# Patient Record
Sex: MALE | Race: WHITE | NOT HISPANIC OR LATINO | URBAN - METROPOLITAN AREA
[De-identification: names, ages, dates, MRNs, and addresses within clinical notes are randomized per-mention and may not be internally consistent; named-entity substitution may affect disease eponyms.]

---

## 2018-08-25 ENCOUNTER — EMERGENCY (EMERGENCY)
Facility: HOSPITAL | Age: 58
LOS: 1 days | Discharge: HOME | End: 2018-08-25
Attending: EMERGENCY MEDICINE
Payer: COMMERCIAL

## 2018-08-25 VITALS
HEART RATE: 103 BPM | OXYGEN SATURATION: 97 % | DIASTOLIC BLOOD PRESSURE: 70 MMHG | TEMPERATURE: 99 F | RESPIRATION RATE: 17 BRPM | SYSTOLIC BLOOD PRESSURE: 124 MMHG

## 2018-08-25 VITALS — WEIGHT: 160.06 LBS | HEIGHT: 67 IN

## 2018-08-25 LAB
ALBUMIN SERPL ELPH-MCNC: 3.9 G/DL — SIGNIFICANT CHANGE UP (ref 3.3–5.2)
ALP SERPL-CCNC: 121 U/L — HIGH (ref 40–120)
ALT FLD-CCNC: 9 U/L — SIGNIFICANT CHANGE UP
ANION GAP SERPL CALC-SCNC: 12 MMOL/L — SIGNIFICANT CHANGE UP (ref 5–17)
AST SERPL-CCNC: 13 U/L — SIGNIFICANT CHANGE UP
BILIRUB SERPL-MCNC: <0.2 MG/DL — LOW (ref 0.4–2)
BUN SERPL-MCNC: 28 MG/DL — HIGH (ref 8–20)
CALCIUM SERPL-MCNC: 9.2 MG/DL — SIGNIFICANT CHANGE UP (ref 8.6–10.2)
CHLORIDE SERPL-SCNC: 106 MMOL/L — SIGNIFICANT CHANGE UP (ref 98–107)
CO2 SERPL-SCNC: 23 MMOL/L — SIGNIFICANT CHANGE UP (ref 22–29)
CREAT SERPL-MCNC: 1.5 MG/DL — HIGH (ref 0.5–1.3)
EOSINOPHIL # BLD AUTO: 0.1 K/UL — SIGNIFICANT CHANGE UP (ref 0–0.5)
EOSINOPHIL NFR BLD AUTO: 2 % — SIGNIFICANT CHANGE UP (ref 0–5)
GLUCOSE SERPL-MCNC: 159 MG/DL — HIGH (ref 70–115)
HCT VFR BLD CALC: 34.6 % — LOW (ref 42–52)
HGB BLD-MCNC: 11.4 G/DL — LOW (ref 14–18)
LACTATE BLDV-MCNC: 1.1 MMOL/L — SIGNIFICANT CHANGE UP (ref 0.5–2)
LYMPHOCYTES # BLD AUTO: 0.8 K/UL — LOW (ref 1–4.8)
LYMPHOCYTES # BLD AUTO: 13.6 % — LOW (ref 20–55)
MCHC RBC-ENTMCNC: 31.8 PG — HIGH (ref 27–31)
MCHC RBC-ENTMCNC: 32.9 G/DL — SIGNIFICANT CHANGE UP (ref 32–36)
MCV RBC AUTO: 96.6 FL — HIGH (ref 80–94)
MONOCYTES # BLD AUTO: 0.4 K/UL — SIGNIFICANT CHANGE UP (ref 0–0.8)
MONOCYTES NFR BLD AUTO: 7.1 % — SIGNIFICANT CHANGE UP (ref 3–10)
NEUTROPHILS # BLD AUTO: 4.5 K/UL — SIGNIFICANT CHANGE UP (ref 1.8–8)
NEUTROPHILS NFR BLD AUTO: 77 % — HIGH (ref 37–73)
PLATELET # BLD AUTO: 177 K/UL — SIGNIFICANT CHANGE UP (ref 150–400)
POTASSIUM SERPL-MCNC: 5.2 MMOL/L — SIGNIFICANT CHANGE UP (ref 3.5–5.3)
POTASSIUM SERPL-SCNC: 5.2 MMOL/L — SIGNIFICANT CHANGE UP (ref 3.5–5.3)
PROT SERPL-MCNC: 7.6 G/DL — SIGNIFICANT CHANGE UP (ref 6.6–8.7)
RBC # BLD: 3.58 M/UL — LOW (ref 4.6–6.2)
RBC # FLD: 12.7 % — SIGNIFICANT CHANGE UP (ref 11–15.6)
SODIUM SERPL-SCNC: 141 MMOL/L — SIGNIFICANT CHANGE UP (ref 135–145)
WBC # BLD: 5.9 K/UL — SIGNIFICANT CHANGE UP (ref 4.8–10.8)
WBC # FLD AUTO: 5.9 K/UL — SIGNIFICANT CHANGE UP (ref 4.8–10.8)

## 2018-08-25 PROCEDURE — 80053 COMPREHEN METABOLIC PANEL: CPT

## 2018-08-25 PROCEDURE — 85027 COMPLETE CBC AUTOMATED: CPT

## 2018-08-25 PROCEDURE — 83605 ASSAY OF LACTIC ACID: CPT

## 2018-08-25 PROCEDURE — 99284 EMERGENCY DEPT VISIT MOD MDM: CPT

## 2018-08-25 PROCEDURE — 96372 THER/PROPH/DIAG INJ SC/IM: CPT | Mod: XU

## 2018-08-25 PROCEDURE — 87040 BLOOD CULTURE FOR BACTERIA: CPT

## 2018-08-25 PROCEDURE — 99284 EMERGENCY DEPT VISIT MOD MDM: CPT | Mod: 25

## 2018-08-25 PROCEDURE — 96365 THER/PROPH/DIAG IV INF INIT: CPT

## 2018-08-25 PROCEDURE — 96375 TX/PRO/DX INJ NEW DRUG ADDON: CPT

## 2018-08-25 PROCEDURE — 82962 GLUCOSE BLOOD TEST: CPT

## 2018-08-25 PROCEDURE — 36415 COLL VENOUS BLD VENIPUNCTURE: CPT

## 2018-08-25 RX ORDER — FAMOTIDINE 10 MG/ML
1 INJECTION INTRAVENOUS
Qty: 10 | Refills: 0
Start: 2018-08-25 | End: 2018-09-03

## 2018-08-25 RX ORDER — CEPHALEXIN 500 MG
1 CAPSULE ORAL
Qty: 28 | Refills: 0
Start: 2018-08-25 | End: 2018-08-31

## 2018-08-25 RX ORDER — FAMOTIDINE 10 MG/ML
20 INJECTION INTRAVENOUS ONCE
Qty: 0 | Refills: 0 | Status: COMPLETED | OUTPATIENT
Start: 2018-08-25 | End: 2018-08-25

## 2018-08-25 RX ORDER — CETIRIZINE HYDROCHLORIDE 10 MG/1
1 TABLET ORAL
Qty: 10 | Refills: 0
Start: 2018-08-25 | End: 2018-09-03

## 2018-08-25 RX ORDER — VANCOMYCIN HCL 1 G
1000 VIAL (EA) INTRAVENOUS ONCE
Qty: 0 | Refills: 0 | Status: COMPLETED | OUTPATIENT
Start: 2018-08-25 | End: 2018-08-25

## 2018-08-25 RX ORDER — AMPICILLIN SODIUM AND SULBACTAM SODIUM 250; 125 MG/ML; MG/ML
3 INJECTION, POWDER, FOR SUSPENSION INTRAMUSCULAR; INTRAVENOUS ONCE
Qty: 0 | Refills: 0 | Status: COMPLETED | OUTPATIENT
Start: 2018-08-25 | End: 2018-08-25

## 2018-08-25 RX ORDER — DIPHENHYDRAMINE HCL 50 MG
25 CAPSULE ORAL ONCE
Qty: 0 | Refills: 0 | Status: COMPLETED | OUTPATIENT
Start: 2018-08-25 | End: 2018-08-25

## 2018-08-25 RX ORDER — DEXAMETHASONE 0.5 MG/5ML
8 ELIXIR ORAL ONCE
Qty: 0 | Refills: 0 | Status: COMPLETED | OUTPATIENT
Start: 2018-08-25 | End: 2018-08-25

## 2018-08-25 RX ADMIN — Medication 8 MILLIGRAM(S): at 15:14

## 2018-08-25 RX ADMIN — Medication 125 MILLIGRAM(S): at 12:38

## 2018-08-25 RX ADMIN — Medication 25 MILLIGRAM(S): at 12:38

## 2018-08-25 RX ADMIN — Medication 250 MILLIGRAM(S): at 13:01

## 2018-08-25 RX ADMIN — FAMOTIDINE 20 MILLIGRAM(S): 10 INJECTION INTRAVENOUS at 12:39

## 2018-08-25 RX ADMIN — Medication 1000 MILLIGRAM(S): at 14:15

## 2018-08-25 RX ADMIN — AMPICILLIN SODIUM AND SULBACTAM SODIUM 200 GRAM(S): 250; 125 INJECTION, POWDER, FOR SUSPENSION INTRAMUSCULAR; INTRAVENOUS at 12:44

## 2018-08-25 NOTE — ED ADULT NURSE REASSESSMENT NOTE - NS ED NURSE REASSESS COMMENT FT1
Meds completed.  Decreased swelling and redness to lt hand. Pt re-evaluated by PA, stable for discharge. Out-pt f/u. Pt instructed to return to ED as needed.

## 2018-08-25 NOTE — ED STATDOCS - OBJECTIVE STATEMENT
57 yr old M presented to ED with his mother for left thompson 57 yr old M presented to ED with his mother for left hand pain and swelling x 1 day. Pt explained that he was bitten by an 57 yr old M presented to ED with his mother for left hand pain and swelling x 1 day. Pt explained that he was bitten by an insect. Pt says that he cannot identify the insect that bit him. Pt explained that his hand got swollen over night and it got very red. Pt admits to a hx of DM and hypercholesterolemia. Pt denies any numbness, weakness or paraesthesia. Pt says that when he got up this morning he could not make a fist but now he can. Pt denies any chest pain or shortness of breath.

## 2018-08-25 NOTE — ED STATDOCS - ATTENDING CONTRIBUTION TO CARE
left forearm swelling and pain.  pt states bitten by insect, mild lymphangitis volar surface left forearm, ,  hx dm.  no signs of compartment syndrome, good pulse noted.  no trauma to suspect DVT.  will treat for both cellulitis and allergic reaction with close out patient f/u

## 2018-08-25 NOTE — ED STATDOCS - PROGRESS NOTE DETAILS
Pt treated with IV Vancomycin and Unasyn . IV hydrated. Lactate normal and Pt has no complaints t. All labs results discussed with patient and Pt D/C with F/U with Dr. Mahmood as discussed for elevated Bun/Cr . Pt D/C in stable condition with Rx sent to the pharmacy.

## 2018-08-25 NOTE — ED STATDOCS - PHYSICAL EXAMINATION
Left hand and forearm redness and swelling  noted. No deformity noted  Normal pulses present. Pt has normal ROM in fingers, wrist and no pain on palpation

## 2018-08-25 NOTE — ED STATDOCS - MEDICAL DECISION MAKING DETAILS
57 yr old M presented to ED with his mother for left hand pain and swelling x 1 day. Pt explained that he was bitten by an insect. Pt says that he cannot identify the insect that bit him. Pt explained that his hand got swollen over night and it got very red. Pt Examined and all labs discussed and Pt will F/U for elevated BUN/Cr as discussed

## 2018-08-25 NOTE — ED ADULT TRIAGE NOTE - CHIEF COMPLAINT QUOTE
Patient arrived ambulatory to ED, awake, alert, and oriented times 3, breathing unlabored.  Patient was bite by big yesterday and last night started to experience swelling and redness to left hand.  patient also complaining of pain to left hand .  No fever or chills

## 2018-08-30 LAB
CULTURE RESULTS: SIGNIFICANT CHANGE UP
CULTURE RESULTS: SIGNIFICANT CHANGE UP
SPECIMEN SOURCE: SIGNIFICANT CHANGE UP
SPECIMEN SOURCE: SIGNIFICANT CHANGE UP

## 2019-03-21 ENCOUNTER — APPOINTMENT (OUTPATIENT)
Dept: GASTROENTEROLOGY | Facility: CLINIC | Age: 59
End: 2019-03-21
Payer: COMMERCIAL

## 2019-03-21 VITALS
SYSTOLIC BLOOD PRESSURE: 114 MMHG | HEIGHT: 68 IN | RESPIRATION RATE: 15 BRPM | OXYGEN SATURATION: 98 % | WEIGHT: 152 LBS | BODY MASS INDEX: 23.04 KG/M2 | HEART RATE: 107 BPM | DIASTOLIC BLOOD PRESSURE: 77 MMHG

## 2019-03-21 DIAGNOSIS — Z83.79 FAMILY HISTORY OF OTHER DISEASES OF THE DIGESTIVE SYSTEM: ICD-10-CM

## 2019-03-21 DIAGNOSIS — Z87.19 PERSONAL HISTORY OF OTHER DISEASES OF THE DIGESTIVE SYSTEM: ICD-10-CM

## 2019-03-21 PROCEDURE — 99204 OFFICE O/P NEW MOD 45 MIN: CPT

## 2019-03-21 RX ORDER — LISINOPRIL 2.5 MG/1
2.5 TABLET ORAL
Refills: 0 | Status: ACTIVE | COMMUNITY

## 2019-03-21 RX ORDER — METFORMIN HYDROCHLORIDE 1000 MG/1
1000 TABLET, COATED ORAL
Refills: 0 | Status: ACTIVE | COMMUNITY

## 2019-03-21 NOTE — ASSESSMENT
[FreeTextEntry1] : CRC screening: Will schedule screening colonoscopy.  MiraLAX prep.  Patient is of average risk.\par \par Anemia: Normocytic indices.  Possible relationship to his underlying renal insufficiency.  Will send for anemia workup.  If iron deficient, will plan on EGD as well.

## 2019-03-21 NOTE — CONSULT LETTER
[Dear  ___] : Dear  [unfilled], [Consult Letter:] : I had the pleasure of evaluating your patient, [unfilled]. [Please see my note below.] : Please see my note below. [Consult Closing:] : Thank you very much for allowing me to participate in the care of this patient.  If you have any questions, please do not hesitate to contact me. [FreeTextEntry3] : Very truly yours,\par \par STACEY Adhikari MD\par [facility]\par Gastroenterology at [place]\par [address], [address]\[place] [address]\par Tel [phone]\par Fax [phone]

## 2019-03-21 NOTE — HISTORY OF PRESENT ILLNESS
[de-identified] : This is a 58-year-old man with a past medical history of type II diabetes mellitus on metformin, hypertension on lisinopril, and hyperlipidemia on atorvastatin presents for evaluation for anemia.  The patient was first noted to be anemic in August of 2018 when he presented to [facility] after an insect bite.  His hemoglobin was noted to be 11.4 g/dL.  On repeat blood work done in February, his hemoglobin had nearly normalized.  The patient denies any abdominal pain, bleeding, rectal bleeding, melena, or any changes in his bowel habits.  He denies any family history of colon cancer, but his father did have celiac disease.

## 2019-03-24 LAB
ALBUMIN SERPL ELPH-MCNC: 4 G/DL
ALP BLD-CCNC: 115 U/L
ALT SERPL-CCNC: 11 U/L
ANION GAP SERPL CALC-SCNC: 9 MMOL/L
AST SERPL-CCNC: 12 U/L
BASOPHILS # BLD AUTO: 0.05 K/UL
BASOPHILS NFR BLD AUTO: 0.7 %
BILIRUB SERPL-MCNC: <0.2 MG/DL
BUN SERPL-MCNC: 22 MG/DL
CALCIUM SERPL-MCNC: 9.2 MG/DL
CHLORIDE SERPL-SCNC: 103 MMOL/L
CO2 SERPL-SCNC: 25 MMOL/L
CREAT SERPL-MCNC: 1.55 MG/DL
EOSINOPHIL # BLD AUTO: 0.23 K/UL
EOSINOPHIL NFR BLD AUTO: 3.4 %
FERRITIN SERPL-MCNC: 235 NG/ML
FOLATE SERPL-MCNC: 12.3 NG/ML
GLUCOSE SERPL-MCNC: 269 MG/DL
HCT VFR BLD CALC: 34.2 %
HGB BLD-MCNC: 11 G/DL
IMM GRANULOCYTES NFR BLD AUTO: 0.4 %
INR PPP: 1.03 RATIO
IRON SATN MFR SERPL: 20 %
IRON SERPL-MCNC: 52 UG/DL
LYMPHOCYTES # BLD AUTO: 1.43 K/UL
LYMPHOCYTES NFR BLD AUTO: 21.1 %
MAN DIFF?: NORMAL
MCHC RBC-ENTMCNC: 31.8 PG
MCHC RBC-ENTMCNC: 32.2 GM/DL
MCV RBC AUTO: 98.8 FL
MONOCYTES # BLD AUTO: 0.42 K/UL
MONOCYTES NFR BLD AUTO: 6.2 %
NEUTROPHILS # BLD AUTO: 4.63 K/UL
NEUTROPHILS NFR BLD AUTO: 68.2 %
PLATELET # BLD AUTO: 282 K/UL
POTASSIUM SERPL-SCNC: 5.2 MMOL/L
PROT SERPL-MCNC: 7.3 G/DL
PT BLD: 11.7 SEC
RBC # BLD: 3.46 M/UL
RBC # BLD: 3.46 M/UL
RBC # FLD: 12.1 %
RETICS # AUTO: 1.3 %
RETICS AGGREG/RBC NFR: 43.6 K/UL
SODIUM SERPL-SCNC: 137 MMOL/L
TIBC SERPL-MCNC: 260 UG/DL
UIBC SERPL-MCNC: 208 UG/DL
VIT B12 SERPL-MCNC: 447 PG/ML
WBC # FLD AUTO: 6.79 K/UL

## 2019-03-28 LAB
TTG IGA SER IA-ACNC: 2.2 U/ML
TTG IGA SER-ACNC: NEGATIVE

## 2019-05-28 ENCOUNTER — APPOINTMENT (OUTPATIENT)
Dept: HEMATOLOGY ONCOLOGY | Facility: CLINIC | Age: 59
End: 2019-05-28

## 2019-07-01 ENCOUNTER — APPOINTMENT (OUTPATIENT)
Dept: GASTROENTEROLOGY | Facility: GI CENTER | Age: 59
End: 2019-07-01
Payer: MEDICAID

## 2019-07-01 ENCOUNTER — OUTPATIENT (OUTPATIENT)
Dept: OUTPATIENT SERVICES | Facility: HOSPITAL | Age: 59
LOS: 1 days | End: 2019-07-01
Payer: MEDICAID

## 2019-07-01 ENCOUNTER — RESULT REVIEW (OUTPATIENT)
Age: 59
End: 2019-07-01

## 2019-07-01 DIAGNOSIS — K64.4 RESIDUAL HEMORRHOIDAL SKIN TAGS: ICD-10-CM

## 2019-07-01 DIAGNOSIS — K57.90 DIVERTICULOSIS OF INTESTINE, PART UNSPECIFIED, W/OUT PERFORATION OR ABSCESS W/OUT BLEEDING: ICD-10-CM

## 2019-07-01 DIAGNOSIS — K64.8 OTHER HEMORRHOIDS: ICD-10-CM

## 2019-07-01 DIAGNOSIS — D64.9 ANEMIA, UNSPECIFIED: ICD-10-CM

## 2019-07-01 DIAGNOSIS — K62.1 RECTAL POLYP: ICD-10-CM

## 2019-07-01 LAB — GLUCOSE BLDC GLUCOMTR-MCNC: 138 MG/DL — HIGH (ref 70–99)

## 2019-07-01 PROCEDURE — 82962 GLUCOSE BLOOD TEST: CPT

## 2019-07-01 PROCEDURE — 88305 TISSUE EXAM BY PATHOLOGIST: CPT | Mod: 26

## 2019-07-01 PROCEDURE — 45380 COLONOSCOPY AND BIOPSY: CPT

## 2019-07-01 PROCEDURE — 88305 TISSUE EXAM BY PATHOLOGIST: CPT

## 2019-07-01 PROCEDURE — 45380 COLONOSCOPY AND BIOPSY: CPT | Mod: PT

## 2019-07-01 NOTE — PROCEDURE
[With Biopsy] : with biopsy [With Polypectomy] : polypectomy [Colon Cancer Screening] : colon cancer screening [Procedure Explained] : The procedure was explained [Allergies Reviewed] : allergies reviewed. [Risks] : Risks [Benefits] : benefits [Alternatives] : alternatives [Bleeding] : bleeding risk [Infection] : risk of infection [Consent Obtained] : written consent was obtained prior to the procedure and is detailed in the patient's record [Patient] : the patient [Bowel Prep Kit] : the patient took the appropriate bowel preparation kit as directed [Approved Diet Followed] : the patient avoided solid foods and adhered to the approved diet list for 24 hours prior to the procedure [Automated Blood Pressure Cuff] : automated blood pressure cuff [Cardiac Monitor] : cardiac monitor [Pulse Oximeter] : pulse oximeter [Propofol ___ mg IV] : Propofol [unfilled] ~Umg intravenously [2] : 2 [Sedation Clearance] : the patient was cleared for moderate sedation [Prep Qualtiy: ___] : Prep Quality:  [unfilled] [Withdrawal Time: ___] : Withdrawal Time:  [unfilled] [Performed By: ___] : Performed by:  CAROLINE [Left Lateral Decubitus] : The patient was positioned in the left lateral decubitus position [Abnormal Rectum] : an abnormal rectum [External Hemorrhoids] : external hemorrhoids [Normal Prostate] : a normal prostate [Cecum (Landmarks)] : and guided to the cecum which was identified by the anatomic landmarks of the appendiceal orifice and ileocecal valve [No Difficulty] : without difficulty [Insufflated] : insufflated [Single Pass Needed] : after a single pass [Retroflex View] : a retroflex view of the rectum was performed [Normal] : Normal [Diverticulosis] : diverticulosis [Hemorrhoids] : hemorrhoids [Polyps] : polyps [Biopsy] : biopsy [Sent to Pathology] : was sent to pathology for analysis [Tolerated Well] : the patient tolerated the procedure well [Vital Signs Stable] : the vital signs were stable [No Complications] : There were no complications [FreeTextEntry2] : Olympus [id number]

## 2019-07-01 NOTE — ASSESSMENT
[FreeTextEntry1] : This is a 58-year-old man presented for a screening colonoscopy.  The exam was notable for a good bowel prep with a [place] bowel preparation scale score of nine.  The digital rectal examination was notable for mildly decreased sphincter tone and external hemorrhoids.  The colonoscopy was notable for mild diverticular disease with small mouth diverticula scattered throughout much of the colon with the exception of the ascending colon.  In the rectum, there were both internal and external hemorrhoids and two diminutive sessile polyps that were resected and retrieved using cold biopsy polypectomy.\par \par Followup pathology\par Repeat colonoscopy in five years if polyps are adenomatous

## 2019-07-01 NOTE — ASSESSMENT
[FreeTextEntry1] : This is a 58-year-old man presented for a screening colonoscopy.  The exam was notable for a good bowel prep with a [place] bowel preparation scale score of nine.  The digital rectal examination was notable for mildly decreased sphincter tone and external hemorrhoids.  The colonoscopy was notable for mild diverticular disease with small mouth diverticula scattered throughout much of the colon with the exception of the ascending colon.  In the rectum, there were both internal and external hemorrhoids and two diminutive sessile polyps that were resected and retrieved using cold biopsy polypectomy.\par \par Followup pathology\par Repeat colonoscopy in five years if polyps are adenomatous

## 2019-07-01 NOTE — PROCEDURE
[With Biopsy] : with biopsy [With Polypectomy] : polypectomy [Colon Cancer Screening] : colon cancer screening [Procedure Explained] : The procedure was explained [Allergies Reviewed] : allergies reviewed. [Risks] : Risks [Benefits] : benefits [Alternatives] : alternatives [Bleeding] : bleeding risk [Infection] : risk of infection [Consent Obtained] : written consent was obtained prior to the procedure and is detailed in the patient's record [Patient] : the patient [Bowel Prep Kit] : the patient took the appropriate bowel preparation kit as directed [Approved Diet Followed] : the patient avoided solid foods and adhered to the approved diet list for 24 hours prior to the procedure [Automated Blood Pressure Cuff] : automated blood pressure cuff [Cardiac Monitor] : cardiac monitor [Pulse Oximeter] : pulse oximeter [Propofol ___ mg IV] : Propofol [unfilled] ~Umg intravenously [2] : 2 [Sedation Clearance] : the patient was cleared for moderate sedation [Prep Qualtiy: ___] : Prep Quality:  [unfilled] [Withdrawal Time: ___] : Withdrawal Time:  [unfilled] [Performed By: ___] : Performed by:  CAROLINE [Left Lateral Decubitus] : The patient was positioned in the left lateral decubitus position [Abnormal Rectum] : an abnormal rectum [External Hemorrhoids] : external hemorrhoids [Normal Prostate] : a normal prostate [Cecum (Landmarks)] : and guided to the cecum which was identified by the anatomic landmarks of the appendiceal orifice and ileocecal valve [No Difficulty] : without difficulty [Insufflated] : insufflated [Single Pass Needed] : after a single pass [Retroflex View] : a retroflex view of the rectum was performed [Normal] : Normal [Diverticulosis] : diverticulosis [Hemorrhoids] : hemorrhoids [Polyps] : polyps [Biopsy] : biopsy [Sent to Pathology] : was sent to pathology for analysis [Tolerated Well] : the patient tolerated the procedure well [Vital Signs Stable] : the vital signs were stable [No Complications] : There were no complications [FreeTextEntry2] : Olympus [id number]

## 2019-07-03 LAB — SURGICAL PATHOLOGY STUDY: SIGNIFICANT CHANGE UP

## 2019-07-08 DIAGNOSIS — D12.6 BENIGN NEOPLASM OF COLON, UNSPECIFIED: ICD-10-CM

## 2019-07-08 DIAGNOSIS — Z12.11 ENCOUNTER FOR SCREENING FOR MALIGNANT NEOPLASM OF COLON: ICD-10-CM

## 2019-07-12 ENCOUNTER — OTHER (OUTPATIENT)
Age: 59
End: 2019-07-12

## 2020-01-01 ENCOUNTER — TRANSCRIPTION ENCOUNTER (OUTPATIENT)
Age: 60
End: 2020-01-01

## 2020-01-01 ENCOUNTER — OUTPATIENT (OUTPATIENT)
Dept: OUTPATIENT SERVICES | Facility: HOSPITAL | Age: 60
LOS: 1 days | Discharge: HOME | End: 2020-01-01
Payer: MEDICAID

## 2020-01-01 ENCOUNTER — LABORATORY RESULT (OUTPATIENT)
Age: 60
End: 2020-01-01

## 2020-01-01 ENCOUNTER — OUTPATIENT (OUTPATIENT)
Dept: OUTPATIENT SERVICES | Facility: HOSPITAL | Age: 60
LOS: 1 days | End: 2020-01-01
Payer: MEDICAID

## 2020-01-01 ENCOUNTER — RESULT REVIEW (OUTPATIENT)
Age: 60
End: 2020-01-01

## 2020-01-01 ENCOUNTER — APPOINTMENT (OUTPATIENT)
Dept: HEMATOLOGY ONCOLOGY | Facility: CLINIC | Age: 60
End: 2020-01-01

## 2020-01-01 ENCOUNTER — INPATIENT (INPATIENT)
Facility: HOSPITAL | Age: 60
LOS: 0 days | Discharge: HOME | DRG: 871 | End: 2020-06-26
Attending: INTERNAL MEDICINE | Admitting: INTERNAL MEDICINE
Payer: MEDICAID

## 2020-01-01 ENCOUNTER — APPOINTMENT (OUTPATIENT)
Age: 60
End: 2020-01-01

## 2020-01-01 ENCOUNTER — APPOINTMENT (OUTPATIENT)
Dept: NUCLEAR MEDICINE | Facility: CLINIC | Age: 60
End: 2020-01-01
Payer: MEDICAID

## 2020-01-01 ENCOUNTER — APPOINTMENT (OUTPATIENT)
Dept: DISASTER EMERGENCY | Facility: CLINIC | Age: 60
End: 2020-01-01

## 2020-01-01 ENCOUNTER — APPOINTMENT (OUTPATIENT)
Dept: GASTROENTEROLOGY | Facility: CLINIC | Age: 60
End: 2020-01-01
Payer: MEDICAID

## 2020-01-01 ENCOUNTER — APPOINTMENT (OUTPATIENT)
Dept: THORACIC SURGERY | Facility: CLINIC | Age: 60
End: 2020-01-01
Payer: MEDICAID

## 2020-01-01 ENCOUNTER — APPOINTMENT (OUTPATIENT)
Dept: SURGICAL ONCOLOGY | Facility: CLINIC | Age: 60
End: 2020-01-01
Payer: MEDICAID

## 2020-01-01 ENCOUNTER — EMERGENCY (EMERGENCY)
Facility: HOSPITAL | Age: 60
LOS: 1 days | Discharge: HOME | End: 2020-01-01
Attending: EMERGENCY MEDICINE
Payer: MEDICAID

## 2020-01-01 ENCOUNTER — APPOINTMENT (OUTPATIENT)
Dept: ULTRASOUND IMAGING | Facility: CLINIC | Age: 60
End: 2020-01-01
Payer: MEDICAID

## 2020-01-01 ENCOUNTER — APPOINTMENT (OUTPATIENT)
Dept: GASTROENTEROLOGY | Facility: HOSPITAL | Age: 60
End: 2020-01-01

## 2020-01-01 ENCOUNTER — APPOINTMENT (OUTPATIENT)
Dept: HEMATOLOGY ONCOLOGY | Facility: CLINIC | Age: 60
End: 2020-01-01
Payer: MEDICAID

## 2020-01-01 ENCOUNTER — INPATIENT (INPATIENT)
Facility: HOSPITAL | Age: 60
LOS: 1 days | Discharge: HOME | DRG: 871 | End: 2020-08-01
Attending: HOSPITALIST | Admitting: HOSPITALIST
Payer: MEDICAID

## 2020-01-01 ENCOUNTER — APPOINTMENT (OUTPATIENT)
Dept: RADIATION ONCOLOGY | Facility: CLINIC | Age: 60
End: 2020-01-01
Payer: MEDICAID

## 2020-01-01 ENCOUNTER — OUTPATIENT (OUTPATIENT)
Dept: OUTPATIENT SERVICES | Facility: HOSPITAL | Age: 60
LOS: 1 days | Discharge: HOME | End: 2020-01-01

## 2020-01-01 ENCOUNTER — APPOINTMENT (OUTPATIENT)
Dept: CT IMAGING | Facility: CLINIC | Age: 60
End: 2020-01-01
Payer: MEDICAID

## 2020-01-01 ENCOUNTER — INPATIENT (INPATIENT)
Facility: HOSPITAL | Age: 60
LOS: 6 days | Discharge: NURSING, REHAB OR LONG-TERM CARE | DRG: 871 | End: 2020-08-28
Attending: HOSPITALIST | Admitting: FAMILY MEDICINE
Payer: MEDICAID

## 2020-01-01 ENCOUNTER — INPATIENT (INPATIENT)
Facility: HOSPITAL | Age: 60
LOS: 2 days | Discharge: NURSING, REHAB OR LONG-TERM CARE | DRG: 378 | End: 2020-12-01
Attending: HOSPITALIST | Admitting: STUDENT IN AN ORGANIZED HEALTH CARE EDUCATION/TRAINING PROGRAM
Payer: MEDICAID

## 2020-01-01 ENCOUNTER — OUTPATIENT (OUTPATIENT)
Dept: OUTPATIENT SERVICES | Facility: HOSPITAL | Age: 60
LOS: 1 days | End: 2020-01-01

## 2020-01-01 ENCOUNTER — NON-APPOINTMENT (OUTPATIENT)
Age: 60
End: 2020-01-01

## 2020-01-01 ENCOUNTER — INPATIENT (INPATIENT)
Facility: HOSPITAL | Age: 60
LOS: 4 days | Discharge: HOME | DRG: 872 | End: 2020-08-18
Attending: STUDENT IN AN ORGANIZED HEALTH CARE EDUCATION/TRAINING PROGRAM | Admitting: HOSPITALIST
Payer: MEDICAID

## 2020-01-01 VITALS
OXYGEN SATURATION: 95 % | HEIGHT: 65 IN | HEART RATE: 111 BPM | DIASTOLIC BLOOD PRESSURE: 58 MMHG | SYSTOLIC BLOOD PRESSURE: 100 MMHG | TEMPERATURE: 98 F | RESPIRATION RATE: 18 BRPM

## 2020-01-01 VITALS
SYSTOLIC BLOOD PRESSURE: 110 MMHG | OXYGEN SATURATION: 100 % | TEMPERATURE: 98 F | HEART RATE: 90 BPM | RESPIRATION RATE: 17 BRPM | DIASTOLIC BLOOD PRESSURE: 75 MMHG

## 2020-01-01 VITALS
BODY MASS INDEX: 21.98 KG/M2 | SYSTOLIC BLOOD PRESSURE: 100 MMHG | WEIGHT: 145 LBS | HEART RATE: 141 BPM | OXYGEN SATURATION: 97 % | RESPIRATION RATE: 18 BRPM | TEMPERATURE: 98.3 F | HEIGHT: 68 IN | DIASTOLIC BLOOD PRESSURE: 68 MMHG

## 2020-01-01 VITALS
WEIGHT: 143.74 LBS | DIASTOLIC BLOOD PRESSURE: 61 MMHG | HEIGHT: 65 IN | RESPIRATION RATE: 18 BRPM | HEART RATE: 137 BPM | TEMPERATURE: 99 F | SYSTOLIC BLOOD PRESSURE: 96 MMHG

## 2020-01-01 VITALS
HEIGHT: 67 IN | RESPIRATION RATE: 16 BRPM | SYSTOLIC BLOOD PRESSURE: 120 MMHG | OXYGEN SATURATION: 99 % | TEMPERATURE: 100 F | DIASTOLIC BLOOD PRESSURE: 70 MMHG | HEART RATE: 129 BPM | WEIGHT: 149.91 LBS

## 2020-01-01 VITALS
TEMPERATURE: 103 F | OXYGEN SATURATION: 100 % | DIASTOLIC BLOOD PRESSURE: 72 MMHG | HEIGHT: 65 IN | SYSTOLIC BLOOD PRESSURE: 109 MMHG | RESPIRATION RATE: 20 BRPM | WEIGHT: 143.08 LBS | HEART RATE: 140 BPM

## 2020-01-01 VITALS
SYSTOLIC BLOOD PRESSURE: 75 MMHG | RESPIRATION RATE: 20 BRPM | DIASTOLIC BLOOD PRESSURE: 49 MMHG | TEMPERATURE: 103 F | OXYGEN SATURATION: 90 % | HEART RATE: 143 BPM

## 2020-01-01 VITALS
OXYGEN SATURATION: 93 % | DIASTOLIC BLOOD PRESSURE: 72 MMHG | TEMPERATURE: 98 F | SYSTOLIC BLOOD PRESSURE: 112 MMHG | HEART RATE: 113 BPM | RESPIRATION RATE: 18 BRPM

## 2020-01-01 VITALS
OXYGEN SATURATION: 87 % | TEMPERATURE: 101 F | WEIGHT: 145.06 LBS | SYSTOLIC BLOOD PRESSURE: 88 MMHG | HEIGHT: 65 IN | HEART RATE: 146 BPM | DIASTOLIC BLOOD PRESSURE: 51 MMHG | RESPIRATION RATE: 22 BRPM

## 2020-01-01 VITALS
SYSTOLIC BLOOD PRESSURE: 107 MMHG | HEART RATE: 90 BPM | OXYGEN SATURATION: 100 % | TEMPERATURE: 98 F | RESPIRATION RATE: 17 BRPM | DIASTOLIC BLOOD PRESSURE: 65 MMHG

## 2020-01-01 VITALS
RESPIRATION RATE: 17 BRPM | SYSTOLIC BLOOD PRESSURE: 115 MMHG | OXYGEN SATURATION: 98 % | WEIGHT: 145.4 LBS | DIASTOLIC BLOOD PRESSURE: 73 MMHG | BODY MASS INDEX: 22.11 KG/M2 | HEART RATE: 120 BPM

## 2020-01-01 VITALS
HEART RATE: 141 BPM | HEIGHT: 68 IN | OXYGEN SATURATION: 94 % | BODY MASS INDEX: 21.98 KG/M2 | SYSTOLIC BLOOD PRESSURE: 122 MMHG | WEIGHT: 145.03 LBS | DIASTOLIC BLOOD PRESSURE: 68 MMHG

## 2020-01-01 VITALS
HEIGHT: 65 IN | TEMPERATURE: 101 F | SYSTOLIC BLOOD PRESSURE: 98 MMHG | OXYGEN SATURATION: 95 % | RESPIRATION RATE: 21 BRPM | HEART RATE: 122 BPM | WEIGHT: 139.99 LBS | DIASTOLIC BLOOD PRESSURE: 57 MMHG

## 2020-01-01 VITALS — TEMPERATURE: 98 F | OXYGEN SATURATION: 100 % | HEART RATE: 90 BPM | RESPIRATION RATE: 18 BRPM

## 2020-01-01 VITALS
TEMPERATURE: 99 F | RESPIRATION RATE: 19 BRPM | HEART RATE: 96 BPM | SYSTOLIC BLOOD PRESSURE: 118 MMHG | DIASTOLIC BLOOD PRESSURE: 76 MMHG | OXYGEN SATURATION: 96 %

## 2020-01-01 VITALS
DIASTOLIC BLOOD PRESSURE: 71 MMHG | OXYGEN SATURATION: 98 % | SYSTOLIC BLOOD PRESSURE: 108 MMHG | HEART RATE: 96 BPM | TEMPERATURE: 98 F | RESPIRATION RATE: 18 BRPM

## 2020-01-01 DIAGNOSIS — Z92.21 PERSONAL HISTORY OF ANTINEOPLASTIC CHEMOTHERAPY: Chronic | ICD-10-CM

## 2020-01-01 DIAGNOSIS — Z51.11 ENCOUNTER FOR ANTINEOPLASTIC CHEMOTHERAPY: ICD-10-CM

## 2020-01-01 DIAGNOSIS — C15.9 MALIGNANT NEOPLASM OF ESOPHAGUS, UNSPECIFIED: ICD-10-CM

## 2020-01-01 DIAGNOSIS — Z77.22 CONTACT WITH AND (SUSPECTED) EXPOSURE TO ENVIRONMENTAL TOBACCO SMOKE (ACUTE) (CHRONIC): ICD-10-CM

## 2020-01-01 DIAGNOSIS — Z11.59 ENCOUNTER FOR SCREENING FOR OTHER VIRAL DISEASES: ICD-10-CM

## 2020-01-01 DIAGNOSIS — Z51.5 ENCOUNTER FOR PALLIATIVE CARE: ICD-10-CM

## 2020-01-01 DIAGNOSIS — I10 ESSENTIAL (PRIMARY) HYPERTENSION: ICD-10-CM

## 2020-01-01 DIAGNOSIS — Z98.890 OTHER SPECIFIED POSTPROCEDURAL STATES: Chronic | ICD-10-CM

## 2020-01-01 DIAGNOSIS — N18.9 CHRONIC KIDNEY DISEASE, UNSPECIFIED: ICD-10-CM

## 2020-01-01 DIAGNOSIS — E11.9 TYPE 2 DIABETES MELLITUS W/OUT COMPLICATIONS: ICD-10-CM

## 2020-01-01 DIAGNOSIS — R13.10 DYSPHAGIA, UNSPECIFIED: ICD-10-CM

## 2020-01-01 DIAGNOSIS — Z80.0 FAMILY HISTORY OF MALIGNANT NEOPLASM OF DIGESTIVE ORGANS: ICD-10-CM

## 2020-01-01 DIAGNOSIS — J18.9 PNEUMONIA, UNSPECIFIED ORGANISM: ICD-10-CM

## 2020-01-01 DIAGNOSIS — G89.3 NEOPLASM RELATED PAIN (ACUTE) (CHRONIC): ICD-10-CM

## 2020-01-01 DIAGNOSIS — R93.3 ABNORMAL FINDINGS ON DIAGNOSTIC IMAGING OF OTHER PARTS OF DIGESTIVE TRACT: ICD-10-CM

## 2020-01-01 DIAGNOSIS — R63.4 ABNORMAL WEIGHT LOSS: ICD-10-CM

## 2020-01-01 DIAGNOSIS — Z00.00 ENCOUNTER FOR GENERAL ADULT MEDICAL EXAMINATION W/OUT ABNORMAL FINDINGS: ICD-10-CM

## 2020-01-01 DIAGNOSIS — D64.9 ANEMIA, UNSPECIFIED: ICD-10-CM

## 2020-01-01 DIAGNOSIS — Z80.1 FAMILY HISTORY OF MALIGNANT NEOPLASM OF TRACHEA, BRONCHUS AND LUNG: ICD-10-CM

## 2020-01-01 DIAGNOSIS — R11.11 VOMITING W/OUT NAUSEA: ICD-10-CM

## 2020-01-01 DIAGNOSIS — R10.9 UNSPECIFIED ABDOMINAL PAIN: ICD-10-CM

## 2020-01-01 DIAGNOSIS — Z01.818 ENCOUNTER FOR OTHER PREPROCEDURAL EXAMINATION: ICD-10-CM

## 2020-01-01 DIAGNOSIS — F41.9 ANXIETY DISORDER, UNSPECIFIED: ICD-10-CM

## 2020-01-01 DIAGNOSIS — A41.9 SEPSIS, UNSPECIFIED ORGANISM: ICD-10-CM

## 2020-01-01 DIAGNOSIS — K22.8 OTHER SPECIFIED DISEASES OF ESOPHAGUS: ICD-10-CM

## 2020-01-01 DIAGNOSIS — C15.5 MALIGNANT NEOPLASM OF LOWER THIRD OF ESOPHAGUS: ICD-10-CM

## 2020-01-01 DIAGNOSIS — R11.2 NAUSEA WITH VOMITING, UNSPECIFIED: ICD-10-CM

## 2020-01-01 DIAGNOSIS — Z29.9 ENCOUNTER FOR PROPHYLACTIC MEASURES, UNSPECIFIED: ICD-10-CM

## 2020-01-01 DIAGNOSIS — E11.9 TYPE 2 DIABETES MELLITUS WITHOUT COMPLICATIONS: ICD-10-CM

## 2020-01-01 DIAGNOSIS — Z71.89 OTHER SPECIFIED COUNSELING: ICD-10-CM

## 2020-01-01 DIAGNOSIS — K92.2 GASTROINTESTINAL HEMORRHAGE, UNSPECIFIED: ICD-10-CM

## 2020-01-01 DIAGNOSIS — E78.49 OTHER HYPERLIPIDEMIA: ICD-10-CM

## 2020-01-01 DIAGNOSIS — E87.1 HYPO-OSMOLALITY AND HYPONATREMIA: ICD-10-CM

## 2020-01-01 DIAGNOSIS — E83.42 HYPOMAGNESEMIA: ICD-10-CM

## 2020-01-01 DIAGNOSIS — K21.9 GASTRO-ESOPHAGEAL REFLUX DISEASE W/OUT ESOPHAGITIS: ICD-10-CM

## 2020-01-01 DIAGNOSIS — R12 HEARTBURN: ICD-10-CM

## 2020-01-01 DIAGNOSIS — E78.5 HYPERLIPIDEMIA, UNSPECIFIED: ICD-10-CM

## 2020-01-01 DIAGNOSIS — K59.00 CONSTIPATION, UNSPECIFIED: ICD-10-CM

## 2020-01-01 DIAGNOSIS — C18.9 MALIGNANT NEOPLASM OF COLON, UNSPECIFIED: ICD-10-CM

## 2020-01-01 DIAGNOSIS — Z92.21 PERSONAL HISTORY OF ANTINEOPLASTIC CHEMOTHERAPY: ICD-10-CM

## 2020-01-01 DIAGNOSIS — N17.9 ACUTE KIDNEY FAILURE, UNSPECIFIED: ICD-10-CM

## 2020-01-01 LAB
A1C WITH ESTIMATED AVERAGE GLUCOSE RESULT: 6 % — HIGH (ref 4–5.6)
A1C WITH ESTIMATED AVERAGE GLUCOSE RESULT: 7 % — HIGH (ref 4–5.6)
A1C WITH ESTIMATED AVERAGE GLUCOSE RESULT: 7.4 % — HIGH (ref 4–5.6)
ALBUMIN SERPL ELPH-MCNC: 2.8 G/DL — LOW (ref 3.3–5.2)
ALBUMIN SERPL ELPH-MCNC: 2.9 G/DL — LOW (ref 3.3–5.2)
ALBUMIN SERPL ELPH-MCNC: 3 G/DL — LOW (ref 3.3–5.2)
ALBUMIN SERPL ELPH-MCNC: 3.1 G/DL — LOW (ref 3.3–5.2)
ALBUMIN SERPL ELPH-MCNC: 3.2 G/DL — LOW (ref 3.3–5.2)
ALBUMIN SERPL ELPH-MCNC: 3.4 G/DL — SIGNIFICANT CHANGE UP (ref 3.3–5.2)
ALBUMIN SERPL ELPH-MCNC: 3.4 G/DL — SIGNIFICANT CHANGE UP (ref 3.3–5.2)
ALBUMIN SERPL ELPH-MCNC: 3.8 G/DL — SIGNIFICANT CHANGE UP (ref 3.3–5.2)
ALBUMIN SERPL ELPH-MCNC: 4 G/DL
ALBUMIN SERPL ELPH-MCNC: 4.1 G/DL — SIGNIFICANT CHANGE UP (ref 3.3–5.2)
ALBUMIN SERPL ELPH-MCNC: 4.3 G/DL — SIGNIFICANT CHANGE UP (ref 3.3–5.2)
ALBUMIN SERPL ELPH-MCNC: 4.4 G/DL
ALP BLD-CCNC: 100 U/L
ALP BLD-CCNC: 125 U/L
ALP SERPL-CCNC: 101 U/L — SIGNIFICANT CHANGE UP (ref 40–120)
ALP SERPL-CCNC: 107 U/L — SIGNIFICANT CHANGE UP (ref 40–120)
ALP SERPL-CCNC: 108 U/L — SIGNIFICANT CHANGE UP (ref 40–120)
ALP SERPL-CCNC: 62 U/L — SIGNIFICANT CHANGE UP (ref 40–120)
ALP SERPL-CCNC: 69 U/L — SIGNIFICANT CHANGE UP (ref 40–120)
ALP SERPL-CCNC: 72 U/L — SIGNIFICANT CHANGE UP (ref 40–120)
ALP SERPL-CCNC: 75 U/L — SIGNIFICANT CHANGE UP (ref 40–120)
ALP SERPL-CCNC: 76 U/L — SIGNIFICANT CHANGE UP (ref 40–120)
ALP SERPL-CCNC: 77 U/L — SIGNIFICANT CHANGE UP (ref 40–120)
ALP SERPL-CCNC: 83 U/L — SIGNIFICANT CHANGE UP (ref 40–120)
ALP SERPL-CCNC: 91 U/L — SIGNIFICANT CHANGE UP (ref 40–120)
ALP SERPL-CCNC: 96 U/L — SIGNIFICANT CHANGE UP (ref 40–120)
ALT FLD-CCNC: 11 U/L — SIGNIFICANT CHANGE UP
ALT FLD-CCNC: 12 U/L — SIGNIFICANT CHANGE UP
ALT FLD-CCNC: 13 U/L — SIGNIFICANT CHANGE UP
ALT FLD-CCNC: 13 U/L — SIGNIFICANT CHANGE UP
ALT FLD-CCNC: 14 U/L — SIGNIFICANT CHANGE UP
ALT FLD-CCNC: 17 U/L — SIGNIFICANT CHANGE UP
ALT FLD-CCNC: 18 U/L — SIGNIFICANT CHANGE UP
ALT FLD-CCNC: 23 U/L — SIGNIFICANT CHANGE UP
ALT FLD-CCNC: 9 U/L — SIGNIFICANT CHANGE UP
ALT FLD-CCNC: <5 U/L — SIGNIFICANT CHANGE UP
ALT SERPL-CCNC: 18 U/L
ALT SERPL-CCNC: 20 U/L
ANION GAP SERPL CALC-SCNC: 11 MMOL/L — SIGNIFICANT CHANGE UP (ref 5–17)
ANION GAP SERPL CALC-SCNC: 12 MMOL/L — SIGNIFICANT CHANGE UP (ref 5–17)
ANION GAP SERPL CALC-SCNC: 13 MMOL/L — SIGNIFICANT CHANGE UP (ref 5–17)
ANION GAP SERPL CALC-SCNC: 14 MMOL/L — SIGNIFICANT CHANGE UP (ref 5–17)
ANION GAP SERPL CALC-SCNC: 15 MMOL/L
ANION GAP SERPL CALC-SCNC: 16 MMOL/L — SIGNIFICANT CHANGE UP (ref 5–17)
ANION GAP SERPL CALC-SCNC: 20 MMOL/L
ANION GAP SERPL CALC-SCNC: 20 MMOL/L — HIGH (ref 5–17)
ANION GAP SERPL CALC-SCNC: 9 MMOL/L — SIGNIFICANT CHANGE UP (ref 5–17)
ANISOCYTOSIS BLD QL: SLIGHT — SIGNIFICANT CHANGE UP
APPEARANCE UR: ABNORMAL
APPEARANCE UR: CLEAR — SIGNIFICANT CHANGE UP
APTT BLD: 22.4 SEC — LOW (ref 27.5–36.3)
APTT BLD: 24.7 SEC — LOW (ref 27.5–35.5)
APTT BLD: 25.3 SEC — LOW (ref 27.5–35.5)
APTT BLD: 25.4 SEC — LOW (ref 27.5–35.5)
APTT BLD: 26.8 SEC — LOW (ref 27.5–35.5)
APTT BLD: 26.8 SEC — LOW (ref 27.5–35.5)
AST SERPL-CCNC: 11 U/L — SIGNIFICANT CHANGE UP
AST SERPL-CCNC: 11 U/L — SIGNIFICANT CHANGE UP
AST SERPL-CCNC: 12 U/L — SIGNIFICANT CHANGE UP
AST SERPL-CCNC: 13 U/L — SIGNIFICANT CHANGE UP
AST SERPL-CCNC: 13 U/L — SIGNIFICANT CHANGE UP
AST SERPL-CCNC: 14 U/L — SIGNIFICANT CHANGE UP
AST SERPL-CCNC: 16 U/L
AST SERPL-CCNC: 17 U/L — SIGNIFICANT CHANGE UP
AST SERPL-CCNC: 18 U/L — SIGNIFICANT CHANGE UP
AST SERPL-CCNC: 20 U/L
AST SERPL-CCNC: 22 U/L — SIGNIFICANT CHANGE UP
AST SERPL-CCNC: 25 U/L — SIGNIFICANT CHANGE UP
AST SERPL-CCNC: 7 U/L — SIGNIFICANT CHANGE UP
AST SERPL-CCNC: 9 U/L — SIGNIFICANT CHANGE UP
BACTERIA # UR AUTO: ABNORMAL
BASOPHILS # BLD AUTO: 0 K/UL — SIGNIFICANT CHANGE UP (ref 0–0.2)
BASOPHILS # BLD AUTO: 0.01 K/UL — SIGNIFICANT CHANGE UP (ref 0–0.2)
BASOPHILS # BLD AUTO: 0.03 K/UL — SIGNIFICANT CHANGE UP (ref 0–0.2)
BASOPHILS # BLD AUTO: 0.04 K/UL — SIGNIFICANT CHANGE UP (ref 0–0.2)
BASOPHILS # BLD AUTO: 0.05 K/UL — SIGNIFICANT CHANGE UP (ref 0–0.2)
BASOPHILS # BLD AUTO: 0.06 K/UL — SIGNIFICANT CHANGE UP (ref 0–0.2)
BASOPHILS # BLD AUTO: 0.07 K/UL — SIGNIFICANT CHANGE UP (ref 0–0.2)
BASOPHILS # BLD AUTO: 0.08 K/UL — SIGNIFICANT CHANGE UP (ref 0–0.2)
BASOPHILS # BLD AUTO: 0.1 K/UL — SIGNIFICANT CHANGE UP (ref 0–0.2)
BASOPHILS NFR BLD AUTO: 0 % — SIGNIFICANT CHANGE UP (ref 0–2)
BASOPHILS NFR BLD AUTO: 0.2 % — SIGNIFICANT CHANGE UP (ref 0–2)
BASOPHILS NFR BLD AUTO: 0.3 % — SIGNIFICANT CHANGE UP (ref 0–2)
BASOPHILS NFR BLD AUTO: 0.4 % — SIGNIFICANT CHANGE UP (ref 0–2)
BASOPHILS NFR BLD AUTO: 0.5 % — SIGNIFICANT CHANGE UP (ref 0–2)
BASOPHILS NFR BLD AUTO: 0.6 % — SIGNIFICANT CHANGE UP (ref 0–2)
BASOPHILS NFR BLD AUTO: 0.8 % — SIGNIFICANT CHANGE UP (ref 0–2)
BASOPHILS NFR BLD AUTO: 0.9 % — SIGNIFICANT CHANGE UP (ref 0–2)
BASOPHILS NFR BLD AUTO: 0.9 % — SIGNIFICANT CHANGE UP (ref 0–2)
BASOPHILS NFR BLD AUTO: 1.3 % — SIGNIFICANT CHANGE UP (ref 0–2)
BILIRUB SERPL-MCNC: 0.2 MG/DL — LOW (ref 0.4–2)
BILIRUB SERPL-MCNC: 0.3 MG/DL
BILIRUB SERPL-MCNC: 0.3 MG/DL — LOW (ref 0.4–2)
BILIRUB SERPL-MCNC: 0.4 MG/DL — SIGNIFICANT CHANGE UP (ref 0.4–2)
BILIRUB SERPL-MCNC: <0.2 MG/DL
BILIRUB SERPL-MCNC: <0.2 MG/DL — LOW (ref 0.4–2)
BILIRUB UR-MCNC: NEGATIVE — SIGNIFICANT CHANGE UP
BLD GP AB SCN SERPL QL: SIGNIFICANT CHANGE UP
BLD GP AB SCN SERPL QL: SIGNIFICANT CHANGE UP
BUN SERPL-MCNC: 11 MG/DL — SIGNIFICANT CHANGE UP (ref 8–20)
BUN SERPL-MCNC: 12 MG/DL — SIGNIFICANT CHANGE UP (ref 8–20)
BUN SERPL-MCNC: 12 MG/DL — SIGNIFICANT CHANGE UP (ref 8–20)
BUN SERPL-MCNC: 13 MG/DL — SIGNIFICANT CHANGE UP (ref 8–20)
BUN SERPL-MCNC: 15 MG/DL — SIGNIFICANT CHANGE UP (ref 8–20)
BUN SERPL-MCNC: 16 MG/DL — SIGNIFICANT CHANGE UP (ref 8–20)
BUN SERPL-MCNC: 17 MG/DL — SIGNIFICANT CHANGE UP (ref 8–20)
BUN SERPL-MCNC: 18 MG/DL — SIGNIFICANT CHANGE UP (ref 8–20)
BUN SERPL-MCNC: 18 MG/DL — SIGNIFICANT CHANGE UP (ref 8–20)
BUN SERPL-MCNC: 22 MG/DL — HIGH (ref 8–20)
BUN SERPL-MCNC: 23 MG/DL — HIGH (ref 8–20)
BUN SERPL-MCNC: 24 MG/DL — HIGH (ref 8–20)
BUN SERPL-MCNC: 24 MG/DL — HIGH (ref 8–20)
BUN SERPL-MCNC: 25 MG/DL — HIGH (ref 8–20)
BUN SERPL-MCNC: 33 MG/DL — HIGH (ref 8–20)
BUN SERPL-MCNC: 34 MG/DL — HIGH (ref 8–20)
BUN SERPL-MCNC: 38 MG/DL
BUN SERPL-MCNC: 39 MG/DL — HIGH (ref 8–20)
BUN SERPL-MCNC: 40 MG/DL — HIGH (ref 8–20)
BUN SERPL-MCNC: 41 MG/DL — HIGH (ref 8–20)
BUN SERPL-MCNC: 44 MG/DL
BUN SERPL-MCNC: 44 MG/DL — HIGH (ref 8–20)
BUN SERPL-MCNC: 54 MG/DL — HIGH (ref 8–20)
BURR CELLS BLD QL SMEAR: PRESENT — SIGNIFICANT CHANGE UP
CALCIUM SERPL-MCNC: 10 MG/DL — SIGNIFICANT CHANGE UP (ref 8.6–10.2)
CALCIUM SERPL-MCNC: 10.1 MG/DL — SIGNIFICANT CHANGE UP (ref 8.6–10.2)
CALCIUM SERPL-MCNC: 8 MG/DL — LOW (ref 8.6–10.2)
CALCIUM SERPL-MCNC: 8.5 MG/DL — LOW (ref 8.6–10.2)
CALCIUM SERPL-MCNC: 8.6 MG/DL — SIGNIFICANT CHANGE UP (ref 8.6–10.2)
CALCIUM SERPL-MCNC: 8.8 MG/DL — SIGNIFICANT CHANGE UP (ref 8.6–10.2)
CALCIUM SERPL-MCNC: 8.9 MG/DL — SIGNIFICANT CHANGE UP (ref 8.6–10.2)
CALCIUM SERPL-MCNC: 9 MG/DL — SIGNIFICANT CHANGE UP (ref 8.6–10.2)
CALCIUM SERPL-MCNC: 9.1 MG/DL — SIGNIFICANT CHANGE UP (ref 8.6–10.2)
CALCIUM SERPL-MCNC: 9.2 MG/DL — SIGNIFICANT CHANGE UP (ref 8.6–10.2)
CALCIUM SERPL-MCNC: 9.3 MG/DL — SIGNIFICANT CHANGE UP (ref 8.6–10.2)
CALCIUM SERPL-MCNC: 9.3 MG/DL — SIGNIFICANT CHANGE UP (ref 8.6–10.2)
CALCIUM SERPL-MCNC: 9.4 MG/DL — SIGNIFICANT CHANGE UP (ref 8.6–10.2)
CALCIUM SERPL-MCNC: 9.5 MG/DL — SIGNIFICANT CHANGE UP (ref 8.6–10.2)
CALCIUM SERPL-MCNC: 9.6 MG/DL
CALCIUM SERPL-MCNC: 9.6 MG/DL — SIGNIFICANT CHANGE UP (ref 8.6–10.2)
CALCIUM SERPL-MCNC: 9.6 MG/DL — SIGNIFICANT CHANGE UP (ref 8.6–10.2)
CALCIUM SERPL-MCNC: 9.7 MG/DL
CALCIUM SERPL-MCNC: 9.7 MG/DL — SIGNIFICANT CHANGE UP (ref 8.6–10.2)
CALCIUM SERPL-MCNC: 9.7 MG/DL — SIGNIFICANT CHANGE UP (ref 8.6–10.2)
CHLORIDE SERPL-SCNC: 100 MMOL/L — SIGNIFICANT CHANGE UP (ref 98–107)
CHLORIDE SERPL-SCNC: 100 MMOL/L — SIGNIFICANT CHANGE UP (ref 98–107)
CHLORIDE SERPL-SCNC: 101 MMOL/L — SIGNIFICANT CHANGE UP (ref 98–107)
CHLORIDE SERPL-SCNC: 107 MMOL/L — SIGNIFICANT CHANGE UP (ref 98–107)
CHLORIDE SERPL-SCNC: 89 MMOL/L — LOW (ref 98–107)
CHLORIDE SERPL-SCNC: 91 MMOL/L — LOW (ref 98–107)
CHLORIDE SERPL-SCNC: 93 MMOL/L — LOW (ref 98–107)
CHLORIDE SERPL-SCNC: 93 MMOL/L — LOW (ref 98–107)
CHLORIDE SERPL-SCNC: 94 MMOL/L
CHLORIDE SERPL-SCNC: 95 MMOL/L — LOW (ref 98–107)
CHLORIDE SERPL-SCNC: 96 MMOL/L — LOW (ref 98–107)
CHLORIDE SERPL-SCNC: 97 MMOL/L — LOW (ref 98–107)
CHLORIDE SERPL-SCNC: 98 MMOL/L
CHLORIDE SERPL-SCNC: 98 MMOL/L — SIGNIFICANT CHANGE UP (ref 98–107)
CHLORIDE SERPL-SCNC: 99 MMOL/L — SIGNIFICANT CHANGE UP (ref 98–107)
CK SERPL-CCNC: 22 U/L — LOW (ref 30–200)
CO2 SERPL-SCNC: 19 MMOL/L — LOW (ref 22–29)
CO2 SERPL-SCNC: 21 MMOL/L — LOW (ref 22–29)
CO2 SERPL-SCNC: 21 MMOL/L — LOW (ref 22–29)
CO2 SERPL-SCNC: 22 MMOL/L — SIGNIFICANT CHANGE UP (ref 22–29)
CO2 SERPL-SCNC: 22 MMOL/L — SIGNIFICANT CHANGE UP (ref 22–29)
CO2 SERPL-SCNC: 23 MMOL/L
CO2 SERPL-SCNC: 23 MMOL/L — SIGNIFICANT CHANGE UP (ref 22–29)
CO2 SERPL-SCNC: 23 MMOL/L — SIGNIFICANT CHANGE UP (ref 22–29)
CO2 SERPL-SCNC: 24 MMOL/L
CO2 SERPL-SCNC: 24 MMOL/L — SIGNIFICANT CHANGE UP (ref 22–29)
CO2 SERPL-SCNC: 24 MMOL/L — SIGNIFICANT CHANGE UP (ref 22–29)
CO2 SERPL-SCNC: 25 MMOL/L — SIGNIFICANT CHANGE UP (ref 22–29)
CO2 SERPL-SCNC: 26 MMOL/L — SIGNIFICANT CHANGE UP (ref 22–29)
CO2 SERPL-SCNC: 27 MMOL/L — SIGNIFICANT CHANGE UP (ref 22–29)
COLOR SPEC: YELLOW — SIGNIFICANT CHANGE UP
CREAT SERPL-MCNC: 1.27 MG/DL — SIGNIFICANT CHANGE UP (ref 0.5–1.3)
CREAT SERPL-MCNC: 1.3 MG/DL — SIGNIFICANT CHANGE UP (ref 0.5–1.3)
CREAT SERPL-MCNC: 1.34 MG/DL — HIGH (ref 0.5–1.3)
CREAT SERPL-MCNC: 1.36 MG/DL — HIGH (ref 0.5–1.3)
CREAT SERPL-MCNC: 1.39 MG/DL — HIGH (ref 0.5–1.3)
CREAT SERPL-MCNC: 1.4 MG/DL — HIGH (ref 0.5–1.3)
CREAT SERPL-MCNC: 1.41 MG/DL — HIGH (ref 0.5–1.3)
CREAT SERPL-MCNC: 1.45 MG/DL — HIGH (ref 0.5–1.3)
CREAT SERPL-MCNC: 1.46 MG/DL — HIGH (ref 0.5–1.3)
CREAT SERPL-MCNC: 1.47 MG/DL — HIGH (ref 0.5–1.3)
CREAT SERPL-MCNC: 1.5 MG/DL — HIGH (ref 0.5–1.3)
CREAT SERPL-MCNC: 1.5 MG/DL — HIGH (ref 0.5–1.3)
CREAT SERPL-MCNC: 1.59 MG/DL — HIGH (ref 0.5–1.3)
CREAT SERPL-MCNC: 1.6 MG/DL — HIGH (ref 0.5–1.3)
CREAT SERPL-MCNC: 1.64 MG/DL — HIGH (ref 0.5–1.3)
CREAT SERPL-MCNC: 1.68 MG/DL — HIGH (ref 0.5–1.3)
CREAT SERPL-MCNC: 1.7 MG/DL — HIGH (ref 0.5–1.3)
CREAT SERPL-MCNC: 1.78 MG/DL — HIGH (ref 0.5–1.3)
CREAT SERPL-MCNC: 1.86 MG/DL — HIGH (ref 0.5–1.3)
CREAT SERPL-MCNC: 1.92 MG/DL — HIGH (ref 0.5–1.3)
CREAT SERPL-MCNC: 1.92 MG/DL — HIGH (ref 0.5–1.3)
CREAT SERPL-MCNC: 1.98 MG/DL
CREAT SERPL-MCNC: 2.07 MG/DL
CULTURE RESULTS: NO GROWTH — SIGNIFICANT CHANGE UP
CULTURE RESULTS: SIGNIFICANT CHANGE UP
DACRYOCYTES BLD QL SMEAR: SLIGHT — SIGNIFICANT CHANGE UP
DIFF PNL FLD: NEGATIVE — SIGNIFICANT CHANGE UP
ELLIPTOCYTES BLD QL SMEAR: SLIGHT — SIGNIFICANT CHANGE UP
EOSINOPHIL # BLD AUTO: 0 K/UL — SIGNIFICANT CHANGE UP (ref 0–0.5)
EOSINOPHIL # BLD AUTO: 0.02 K/UL — SIGNIFICANT CHANGE UP (ref 0–0.5)
EOSINOPHIL # BLD AUTO: 0.07 K/UL — SIGNIFICANT CHANGE UP (ref 0–0.5)
EOSINOPHIL # BLD AUTO: 0.1 K/UL — SIGNIFICANT CHANGE UP (ref 0–0.5)
EOSINOPHIL # BLD AUTO: 0.1 K/UL — SIGNIFICANT CHANGE UP (ref 0–0.5)
EOSINOPHIL # BLD AUTO: 0.15 K/UL — SIGNIFICANT CHANGE UP (ref 0–0.5)
EOSINOPHIL # BLD AUTO: 0.16 K/UL — SIGNIFICANT CHANGE UP (ref 0–0.5)
EOSINOPHIL # BLD AUTO: 0.2 K/UL — SIGNIFICANT CHANGE UP (ref 0–0.5)
EOSINOPHIL # BLD AUTO: 0.29 K/UL — SIGNIFICANT CHANGE UP (ref 0–0.5)
EOSINOPHIL # BLD AUTO: 0.41 K/UL — SIGNIFICANT CHANGE UP (ref 0–0.5)
EOSINOPHIL # BLD AUTO: 0.5 K/UL — SIGNIFICANT CHANGE UP (ref 0–0.5)
EOSINOPHIL # BLD AUTO: 0.57 K/UL — HIGH (ref 0–0.5)
EOSINOPHIL # BLD AUTO: 0.57 K/UL — HIGH (ref 0–0.5)
EOSINOPHIL # BLD AUTO: 0.61 K/UL — HIGH (ref 0–0.5)
EOSINOPHIL # BLD AUTO: 0.62 K/UL — HIGH (ref 0–0.5)
EOSINOPHIL # BLD AUTO: 1.3 K/UL — HIGH (ref 0–0.5)
EOSINOPHIL NFR BLD AUTO: 0 % — SIGNIFICANT CHANGE UP (ref 0–6)
EOSINOPHIL NFR BLD AUTO: 0.3 % — SIGNIFICANT CHANGE UP (ref 0–6)
EOSINOPHIL NFR BLD AUTO: 0.7 % — SIGNIFICANT CHANGE UP (ref 0–6)
EOSINOPHIL NFR BLD AUTO: 0.9 % — SIGNIFICANT CHANGE UP (ref 0–6)
EOSINOPHIL NFR BLD AUTO: 1.4 % — SIGNIFICANT CHANGE UP (ref 0–6)
EOSINOPHIL NFR BLD AUTO: 1.7 % — SIGNIFICANT CHANGE UP (ref 0–6)
EOSINOPHIL NFR BLD AUTO: 13.2 % — HIGH (ref 0–6)
EOSINOPHIL NFR BLD AUTO: 2.2 % — SIGNIFICANT CHANGE UP (ref 0–6)
EOSINOPHIL NFR BLD AUTO: 3 % — SIGNIFICANT CHANGE UP (ref 0–6)
EOSINOPHIL NFR BLD AUTO: 3.2 % — SIGNIFICANT CHANGE UP (ref 0–6)
EOSINOPHIL NFR BLD AUTO: 3.6 % — SIGNIFICANT CHANGE UP (ref 0–6)
EOSINOPHIL NFR BLD AUTO: 3.7 % — SIGNIFICANT CHANGE UP (ref 0–6)
EOSINOPHIL NFR BLD AUTO: 6.3 % — HIGH (ref 0–6)
EOSINOPHIL NFR BLD AUTO: 7.1 % — HIGH (ref 0–6)
EOSINOPHIL NFR BLD AUTO: 7.6 % — HIGH (ref 0–6)
EOSINOPHIL NFR BLD AUTO: 9.4 % — HIGH (ref 0–6)
EOSINOPHIL NFR BLD AUTO: 9.4 % — HIGH (ref 0–6)
EPI CELLS # UR: SIGNIFICANT CHANGE UP
ESTIMATED AVERAGE GLUCOSE: 126 MG/DL — HIGH (ref 68–114)
ESTIMATED AVERAGE GLUCOSE: 154 MG/DL — HIGH (ref 68–114)
ESTIMATED AVERAGE GLUCOSE: 166 MG/DL — HIGH (ref 68–114)
GLUCOSE BLDC GLUCOMTR-MCNC: 106 MG/DL — HIGH (ref 70–99)
GLUCOSE BLDC GLUCOMTR-MCNC: 109 MG/DL — HIGH (ref 70–99)
GLUCOSE BLDC GLUCOMTR-MCNC: 127 MG/DL — HIGH (ref 70–99)
GLUCOSE BLDC GLUCOMTR-MCNC: 131 MG/DL — HIGH (ref 70–99)
GLUCOSE BLDC GLUCOMTR-MCNC: 132 MG/DL — HIGH (ref 70–99)
GLUCOSE BLDC GLUCOMTR-MCNC: 136 MG/DL — HIGH (ref 70–99)
GLUCOSE BLDC GLUCOMTR-MCNC: 142 MG/DL — HIGH (ref 70–99)
GLUCOSE BLDC GLUCOMTR-MCNC: 145 MG/DL — HIGH (ref 70–99)
GLUCOSE BLDC GLUCOMTR-MCNC: 148 MG/DL — HIGH (ref 70–99)
GLUCOSE BLDC GLUCOMTR-MCNC: 153 MG/DL — HIGH (ref 70–99)
GLUCOSE BLDC GLUCOMTR-MCNC: 153 MG/DL — HIGH (ref 70–99)
GLUCOSE BLDC GLUCOMTR-MCNC: 159 MG/DL — HIGH (ref 70–99)
GLUCOSE BLDC GLUCOMTR-MCNC: 160 MG/DL — HIGH (ref 70–99)
GLUCOSE BLDC GLUCOMTR-MCNC: 162 MG/DL — HIGH (ref 70–99)
GLUCOSE BLDC GLUCOMTR-MCNC: 163 MG/DL — HIGH (ref 70–99)
GLUCOSE BLDC GLUCOMTR-MCNC: 167 MG/DL — HIGH (ref 70–99)
GLUCOSE BLDC GLUCOMTR-MCNC: 168 MG/DL — HIGH (ref 70–99)
GLUCOSE BLDC GLUCOMTR-MCNC: 171 MG/DL — HIGH (ref 70–99)
GLUCOSE BLDC GLUCOMTR-MCNC: 174 MG/DL — HIGH (ref 70–99)
GLUCOSE BLDC GLUCOMTR-MCNC: 178 MG/DL — HIGH (ref 70–99)
GLUCOSE BLDC GLUCOMTR-MCNC: 181 MG/DL — HIGH (ref 70–99)
GLUCOSE BLDC GLUCOMTR-MCNC: 184 MG/DL — HIGH (ref 70–99)
GLUCOSE BLDC GLUCOMTR-MCNC: 184 MG/DL — HIGH (ref 70–99)
GLUCOSE BLDC GLUCOMTR-MCNC: 185 MG/DL — HIGH (ref 70–99)
GLUCOSE BLDC GLUCOMTR-MCNC: 186 MG/DL — HIGH (ref 70–99)
GLUCOSE BLDC GLUCOMTR-MCNC: 187 MG/DL — HIGH (ref 70–99)
GLUCOSE BLDC GLUCOMTR-MCNC: 188 MG/DL — HIGH (ref 70–99)
GLUCOSE BLDC GLUCOMTR-MCNC: 190 MG/DL — HIGH (ref 70–99)
GLUCOSE BLDC GLUCOMTR-MCNC: 193 MG/DL — HIGH (ref 70–99)
GLUCOSE BLDC GLUCOMTR-MCNC: 194 MG/DL — HIGH (ref 70–99)
GLUCOSE BLDC GLUCOMTR-MCNC: 198 MG/DL — HIGH (ref 70–99)
GLUCOSE BLDC GLUCOMTR-MCNC: 212 MG/DL — HIGH (ref 70–99)
GLUCOSE BLDC GLUCOMTR-MCNC: 213 MG/DL — HIGH (ref 70–99)
GLUCOSE BLDC GLUCOMTR-MCNC: 226 MG/DL — HIGH (ref 70–99)
GLUCOSE BLDC GLUCOMTR-MCNC: 273 MG/DL — HIGH (ref 70–99)
GLUCOSE BLDC GLUCOMTR-MCNC: 274 MG/DL — HIGH (ref 70–99)
GLUCOSE BLDC GLUCOMTR-MCNC: 98 MG/DL — SIGNIFICANT CHANGE UP (ref 70–99)
GLUCOSE SERPL-MCNC: 109 MG/DL — HIGH (ref 70–99)
GLUCOSE SERPL-MCNC: 136 MG/DL — HIGH (ref 70–99)
GLUCOSE SERPL-MCNC: 144 MG/DL
GLUCOSE SERPL-MCNC: 144 MG/DL — HIGH (ref 70–99)
GLUCOSE SERPL-MCNC: 151 MG/DL — HIGH (ref 70–99)
GLUCOSE SERPL-MCNC: 152 MG/DL — HIGH (ref 70–99)
GLUCOSE SERPL-MCNC: 152 MG/DL — HIGH (ref 70–99)
GLUCOSE SERPL-MCNC: 153 MG/DL — HIGH (ref 70–99)
GLUCOSE SERPL-MCNC: 160 MG/DL — HIGH (ref 70–99)
GLUCOSE SERPL-MCNC: 160 MG/DL — HIGH (ref 70–99)
GLUCOSE SERPL-MCNC: 169 MG/DL — HIGH (ref 70–99)
GLUCOSE SERPL-MCNC: 170 MG/DL — HIGH (ref 70–99)
GLUCOSE SERPL-MCNC: 171 MG/DL — HIGH (ref 70–99)
GLUCOSE SERPL-MCNC: 175 MG/DL — HIGH (ref 70–99)
GLUCOSE SERPL-MCNC: 176 MG/DL — HIGH (ref 70–99)
GLUCOSE SERPL-MCNC: 180 MG/DL — HIGH (ref 70–99)
GLUCOSE SERPL-MCNC: 182 MG/DL — HIGH (ref 70–99)
GLUCOSE SERPL-MCNC: 187 MG/DL — HIGH (ref 70–99)
GLUCOSE SERPL-MCNC: 190 MG/DL
GLUCOSE SERPL-MCNC: 190 MG/DL — HIGH (ref 70–99)
GLUCOSE SERPL-MCNC: 193 MG/DL — HIGH (ref 70–99)
GLUCOSE SERPL-MCNC: 204 MG/DL — HIGH (ref 70–99)
GLUCOSE SERPL-MCNC: 218 MG/DL — HIGH (ref 70–99)
GLUCOSE SERPL-MCNC: 232 MG/DL — HIGH (ref 70–99)
GLUCOSE SERPL-MCNC: 237 MG/DL — HIGH (ref 70–99)
GLUCOSE UR QL: 50 MG/DL
GLUCOSE UR QL: NEGATIVE MG/DL — SIGNIFICANT CHANGE UP
HBV CORE IGG+IGM SER QL: NONREACTIVE
HBV SURFACE AB SER QL: NONREACTIVE
HBV SURFACE AG SER QL: NONREACTIVE
HCT VFR BLD CALC: 15.9 % — CRITICAL LOW (ref 39–50)
HCT VFR BLD CALC: 20.8 % — CRITICAL LOW (ref 39–50)
HCT VFR BLD CALC: 22.7 % — LOW (ref 39–50)
HCT VFR BLD CALC: 23 % — LOW (ref 39–50)
HCT VFR BLD CALC: 23.2 % — LOW (ref 39–50)
HCT VFR BLD CALC: 23.2 % — LOW (ref 39–50)
HCT VFR BLD CALC: 23.3 % — LOW (ref 39–50)
HCT VFR BLD CALC: 24.6 % — LOW (ref 39–50)
HCT VFR BLD CALC: 24.8 % — LOW (ref 39–50)
HCT VFR BLD CALC: 25.2 % — LOW (ref 39–50)
HCT VFR BLD CALC: 25.4 % — LOW (ref 39–50)
HCT VFR BLD CALC: 25.7 % — LOW (ref 39–50)
HCT VFR BLD CALC: 26.1 % — LOW (ref 39–50)
HCT VFR BLD CALC: 26.6 % — LOW (ref 39–50)
HCT VFR BLD CALC: 26.7 % — LOW (ref 39–50)
HCT VFR BLD CALC: 27 % — LOW (ref 39–50)
HCT VFR BLD CALC: 27.4 % — LOW (ref 39–50)
HCT VFR BLD CALC: 27.6 % — LOW (ref 39–50)
HCT VFR BLD CALC: 27.6 % — LOW (ref 39–50)
HCT VFR BLD CALC: 27.7 % — LOW (ref 39–50)
HCT VFR BLD CALC: 27.9 % — LOW (ref 39–50)
HCT VFR BLD CALC: 28.3 % — LOW (ref 39–50)
HCT VFR BLD CALC: 28.4 % — LOW (ref 39–50)
HCT VFR BLD CALC: 28.5 % — LOW (ref 39–50)
HCT VFR BLD CALC: 28.9 % — LOW (ref 39–50)
HCT VFR BLD CALC: 29.4 % — LOW (ref 39–50)
HCT VFR BLD CALC: 29.7 % — LOW (ref 39–50)
HCT VFR BLD CALC: 29.9 % — LOW (ref 39–50)
HCT VFR BLD CALC: 32.6 % — LOW (ref 39–50)
HCT VFR BLD CALC: 37 % — LOW (ref 39–50)
HCV AB S/CO SERPL IA: 0.18 S/CO — SIGNIFICANT CHANGE UP (ref 0–0.99)
HCV AB SER QL: NONREACTIVE
HCV AB SERPL-IMP: SIGNIFICANT CHANGE UP
HCV S/CO RATIO: 0.21 S/CO
HGB BLD-MCNC: 10.1 G/DL — LOW (ref 13–17)
HGB BLD-MCNC: 11 G/DL — LOW (ref 13–17)
HGB BLD-MCNC: 12.2 G/DL — LOW (ref 13–17)
HGB BLD-MCNC: 5.1 G/DL — CRITICAL LOW (ref 13–17)
HGB BLD-MCNC: 6.5 G/DL — CRITICAL LOW (ref 13–17)
HGB BLD-MCNC: 7.1 G/DL — LOW (ref 13–17)
HGB BLD-MCNC: 7.3 G/DL — LOW (ref 13–17)
HGB BLD-MCNC: 7.3 G/DL — LOW (ref 13–17)
HGB BLD-MCNC: 7.4 G/DL — LOW (ref 13–17)
HGB BLD-MCNC: 7.7 G/DL — LOW (ref 13–17)
HGB BLD-MCNC: 8 G/DL — LOW (ref 13–17)
HGB BLD-MCNC: 8 G/DL — LOW (ref 13–17)
HGB BLD-MCNC: 8.1 G/DL — LOW (ref 13–17)
HGB BLD-MCNC: 8.2 G/DL — LOW (ref 13–17)
HGB BLD-MCNC: 8.5 G/DL — LOW (ref 13–17)
HGB BLD-MCNC: 8.6 G/DL — LOW (ref 13–17)
HGB BLD-MCNC: 8.8 G/DL — LOW (ref 13–17)
HGB BLD-MCNC: 8.8 G/DL — LOW (ref 13–17)
HGB BLD-MCNC: 8.9 G/DL — LOW (ref 13–17)
HGB BLD-MCNC: 8.9 G/DL — LOW (ref 13–17)
HGB BLD-MCNC: 9 G/DL — LOW (ref 13–17)
HGB BLD-MCNC: 9.1 G/DL — LOW (ref 13–17)
HGB BLD-MCNC: 9.1 G/DL — LOW (ref 13–17)
HGB BLD-MCNC: 9.2 G/DL — LOW (ref 13–17)
HGB BLD-MCNC: 9.3 G/DL — LOW (ref 13–17)
HGB BLD-MCNC: 9.4 G/DL — LOW (ref 13–17)
HGB BLD-MCNC: 9.6 G/DL — LOW (ref 13–17)
HGB BLD-MCNC: 9.8 G/DL — LOW (ref 13–17)
HYPOCHROMIA BLD QL: SLIGHT — SIGNIFICANT CHANGE UP
IMM GRANULOCYTES NFR BLD AUTO: 0.4 % — SIGNIFICANT CHANGE UP (ref 0–1.5)
IMM GRANULOCYTES NFR BLD AUTO: 0.4 % — SIGNIFICANT CHANGE UP (ref 0–1.5)
IMM GRANULOCYTES NFR BLD AUTO: 0.5 % — SIGNIFICANT CHANGE UP (ref 0–1.5)
IMM GRANULOCYTES NFR BLD AUTO: 0.6 % — SIGNIFICANT CHANGE UP (ref 0–1.5)
IMM GRANULOCYTES NFR BLD AUTO: 0.7 % — SIGNIFICANT CHANGE UP (ref 0–1.5)
INR BLD: 0.97 RATIO — SIGNIFICANT CHANGE UP (ref 0.88–1.16)
INR BLD: 1.06 RATIO — SIGNIFICANT CHANGE UP (ref 0.88–1.16)
INR BLD: 1.11 RATIO — SIGNIFICANT CHANGE UP (ref 0.88–1.16)
INR BLD: 1.16 RATIO — SIGNIFICANT CHANGE UP (ref 0.88–1.16)
INR BLD: 1.18 RATIO — HIGH (ref 0.88–1.16)
INR BLD: 1.18 RATIO — HIGH (ref 0.88–1.16)
INR PPP: 0.96 RATIO
INR PPP: 0.99 RATIO
KETONES UR-MCNC: NEGATIVE — SIGNIFICANT CHANGE UP
LACTATE BLDV-MCNC: 0.8 MMOL/L — SIGNIFICANT CHANGE UP (ref 0.5–2)
LACTATE BLDV-MCNC: 0.9 MMOL/L — SIGNIFICANT CHANGE UP (ref 0.5–2)
LACTATE BLDV-MCNC: 1 MMOL/L — SIGNIFICANT CHANGE UP (ref 0.5–2)
LACTATE BLDV-MCNC: 1.8 MMOL/L — SIGNIFICANT CHANGE UP (ref 0.5–2)
LACTATE BLDV-MCNC: 2 MMOL/L — SIGNIFICANT CHANGE UP (ref 0.5–2)
LACTATE BLDV-MCNC: 2.9 MMOL/L — HIGH (ref 0.5–2)
LACTATE BLDV-MCNC: 3.1 MMOL/L — HIGH (ref 0.5–2)
LEUKOCYTE ESTERASE UR-ACNC: NEGATIVE — SIGNIFICANT CHANGE UP
LIDOCAIN IGE QN: 15 U/L — LOW (ref 22–51)
LIDOCAIN IGE QN: 29 U/L — SIGNIFICANT CHANGE UP (ref 22–51)
LYMPHOCYTES # BLD AUTO: 0.4 K/UL — LOW (ref 1–3.3)
LYMPHOCYTES # BLD AUTO: 0.52 K/UL — LOW (ref 1–3.3)
LYMPHOCYTES # BLD AUTO: 0.55 K/UL — LOW (ref 1–3.3)
LYMPHOCYTES # BLD AUTO: 0.64 K/UL — LOW (ref 1–3.3)
LYMPHOCYTES # BLD AUTO: 0.68 K/UL — LOW (ref 1–3.3)
LYMPHOCYTES # BLD AUTO: 0.7 K/UL — LOW (ref 1–3.3)
LYMPHOCYTES # BLD AUTO: 0.86 K/UL — LOW (ref 1–3.3)
LYMPHOCYTES # BLD AUTO: 0.9 K/UL — LOW (ref 1–3.3)
LYMPHOCYTES # BLD AUTO: 0.96 K/UL — LOW (ref 1–3.3)
LYMPHOCYTES # BLD AUTO: 1 K/UL — SIGNIFICANT CHANGE UP (ref 1–3.3)
LYMPHOCYTES # BLD AUTO: 1 K/UL — SIGNIFICANT CHANGE UP (ref 1–3.3)
LYMPHOCYTES # BLD AUTO: 1.04 K/UL — SIGNIFICANT CHANGE UP (ref 1–3.3)
LYMPHOCYTES # BLD AUTO: 1.1 K/UL — SIGNIFICANT CHANGE UP (ref 1–3.3)
LYMPHOCYTES # BLD AUTO: 1.1 K/UL — SIGNIFICANT CHANGE UP (ref 1–3.3)
LYMPHOCYTES # BLD AUTO: 1.12 K/UL — SIGNIFICANT CHANGE UP (ref 1–3.3)
LYMPHOCYTES # BLD AUTO: 1.21 K/UL — SIGNIFICANT CHANGE UP (ref 1–3.3)
LYMPHOCYTES # BLD AUTO: 1.22 K/UL — SIGNIFICANT CHANGE UP (ref 1–3.3)
LYMPHOCYTES # BLD AUTO: 1.27 K/UL — SIGNIFICANT CHANGE UP (ref 1–3.3)
LYMPHOCYTES # BLD AUTO: 1.29 K/UL — SIGNIFICANT CHANGE UP (ref 1–3.3)
LYMPHOCYTES # BLD AUTO: 1.42 K/UL — SIGNIFICANT CHANGE UP (ref 1–3.3)
LYMPHOCYTES # BLD AUTO: 1.5 K/UL — SIGNIFICANT CHANGE UP (ref 1–3.3)
LYMPHOCYTES # BLD AUTO: 10.9 % — LOW (ref 13–44)
LYMPHOCYTES # BLD AUTO: 11 % — LOW (ref 13–44)
LYMPHOCYTES # BLD AUTO: 12.1 % — LOW (ref 13–44)
LYMPHOCYTES # BLD AUTO: 12.4 % — LOW (ref 13–44)
LYMPHOCYTES # BLD AUTO: 12.9 % — LOW (ref 13–44)
LYMPHOCYTES # BLD AUTO: 14.9 % — SIGNIFICANT CHANGE UP (ref 13–44)
LYMPHOCYTES # BLD AUTO: 15.1 % — SIGNIFICANT CHANGE UP (ref 13–44)
LYMPHOCYTES # BLD AUTO: 15.3 % — SIGNIFICANT CHANGE UP (ref 13–44)
LYMPHOCYTES # BLD AUTO: 15.6 % — SIGNIFICANT CHANGE UP (ref 13–44)
LYMPHOCYTES # BLD AUTO: 15.8 % — SIGNIFICANT CHANGE UP (ref 13–44)
LYMPHOCYTES # BLD AUTO: 2.6 % — LOW (ref 13–44)
LYMPHOCYTES # BLD AUTO: 20.9 % — SIGNIFICANT CHANGE UP (ref 13–44)
LYMPHOCYTES # BLD AUTO: 21.8 % — SIGNIFICANT CHANGE UP (ref 13–44)
LYMPHOCYTES # BLD AUTO: 23.8 % — SIGNIFICANT CHANGE UP (ref 13–44)
LYMPHOCYTES # BLD AUTO: 23.9 % — SIGNIFICANT CHANGE UP (ref 13–44)
LYMPHOCYTES # BLD AUTO: 3.5 % — LOW (ref 13–44)
LYMPHOCYTES # BLD AUTO: 6.1 % — LOW (ref 13–44)
LYMPHOCYTES # BLD AUTO: 7.1 % — LOW (ref 13–44)
LYMPHOCYTES # BLD AUTO: 7.5 % — LOW (ref 13–44)
LYMPHOCYTES # BLD AUTO: 8.9 % — LOW (ref 13–44)
LYMPHOCYTES # BLD AUTO: 9.2 % — LOW (ref 13–44)
MACROCYTES BLD QL: SLIGHT — SIGNIFICANT CHANGE UP
MAGNESIUM SERPL-MCNC: 1.3 MG/DL — LOW (ref 1.8–2.6)
MAGNESIUM SERPL-MCNC: 1.5 MG/DL
MAGNESIUM SERPL-MCNC: 1.5 MG/DL — LOW (ref 1.6–2.6)
MAGNESIUM SERPL-MCNC: 1.6 MG/DL — SIGNIFICANT CHANGE UP (ref 1.6–2.6)
MAGNESIUM SERPL-MCNC: 1.7 MG/DL — SIGNIFICANT CHANGE UP (ref 1.6–2.6)
MAGNESIUM SERPL-MCNC: 1.8 MG/DL
MAGNESIUM SERPL-MCNC: 2.1 MG/DL — SIGNIFICANT CHANGE UP (ref 1.6–2.6)
MANUAL SMEAR VERIFICATION: SIGNIFICANT CHANGE UP
MCHC RBC-ENTMCNC: 28.7 PG — SIGNIFICANT CHANGE UP (ref 27–34)
MCHC RBC-ENTMCNC: 29.5 PG — SIGNIFICANT CHANGE UP (ref 27–34)
MCHC RBC-ENTMCNC: 29.6 PG — SIGNIFICANT CHANGE UP (ref 27–34)
MCHC RBC-ENTMCNC: 29.7 PG — SIGNIFICANT CHANGE UP (ref 27–34)
MCHC RBC-ENTMCNC: 29.7 PG — SIGNIFICANT CHANGE UP (ref 27–34)
MCHC RBC-ENTMCNC: 30.8 PG — SIGNIFICANT CHANGE UP (ref 27–34)
MCHC RBC-ENTMCNC: 30.9 PG — SIGNIFICANT CHANGE UP (ref 27–34)
MCHC RBC-ENTMCNC: 31 GM/DL — LOW (ref 32–36)
MCHC RBC-ENTMCNC: 31.3 GM/DL — LOW (ref 32–36)
MCHC RBC-ENTMCNC: 31.3 PG — SIGNIFICANT CHANGE UP (ref 27–34)
MCHC RBC-ENTMCNC: 31.4 GM/DL — LOW (ref 32–36)
MCHC RBC-ENTMCNC: 31.4 GM/DL — LOW (ref 32–36)
MCHC RBC-ENTMCNC: 31.4 PG — SIGNIFICANT CHANGE UP (ref 27–34)
MCHC RBC-ENTMCNC: 31.5 GM/DL — LOW (ref 32–36)
MCHC RBC-ENTMCNC: 31.6 PG — SIGNIFICANT CHANGE UP (ref 27–34)
MCHC RBC-ENTMCNC: 31.6 PG — SIGNIFICANT CHANGE UP (ref 27–34)
MCHC RBC-ENTMCNC: 31.7 GM/DL — LOW (ref 32–36)
MCHC RBC-ENTMCNC: 31.7 PG — SIGNIFICANT CHANGE UP (ref 27–34)
MCHC RBC-ENTMCNC: 31.8 PG — SIGNIFICANT CHANGE UP (ref 27–34)
MCHC RBC-ENTMCNC: 31.9 GM/DL — LOW (ref 32–36)
MCHC RBC-ENTMCNC: 31.9 PG — SIGNIFICANT CHANGE UP (ref 27–34)
MCHC RBC-ENTMCNC: 31.9 PG — SIGNIFICANT CHANGE UP (ref 27–34)
MCHC RBC-ENTMCNC: 32 GM/DL — SIGNIFICANT CHANGE UP (ref 32–36)
MCHC RBC-ENTMCNC: 32 GM/DL — SIGNIFICANT CHANGE UP (ref 32–36)
MCHC RBC-ENTMCNC: 32 PG — SIGNIFICANT CHANGE UP (ref 27–34)
MCHC RBC-ENTMCNC: 32.1 GM/DL — SIGNIFICANT CHANGE UP (ref 32–36)
MCHC RBC-ENTMCNC: 32.1 GM/DL — SIGNIFICANT CHANGE UP (ref 32–36)
MCHC RBC-ENTMCNC: 32.1 PG — SIGNIFICANT CHANGE UP (ref 27–34)
MCHC RBC-ENTMCNC: 32.1 PG — SIGNIFICANT CHANGE UP (ref 27–34)
MCHC RBC-ENTMCNC: 32.2 PG — SIGNIFICANT CHANGE UP (ref 27–34)
MCHC RBC-ENTMCNC: 32.3 GM/DL — SIGNIFICANT CHANGE UP (ref 32–36)
MCHC RBC-ENTMCNC: 32.3 GM/DL — SIGNIFICANT CHANGE UP (ref 32–36)
MCHC RBC-ENTMCNC: 32.3 PG — SIGNIFICANT CHANGE UP (ref 27–34)
MCHC RBC-ENTMCNC: 32.4 PG — SIGNIFICANT CHANGE UP (ref 27–34)
MCHC RBC-ENTMCNC: 32.4 PG — SIGNIFICANT CHANGE UP (ref 27–34)
MCHC RBC-ENTMCNC: 32.5 GM/DL — SIGNIFICANT CHANGE UP (ref 32–36)
MCHC RBC-ENTMCNC: 32.5 GM/DL — SIGNIFICANT CHANGE UP (ref 32–36)
MCHC RBC-ENTMCNC: 32.6 GM/DL — SIGNIFICANT CHANGE UP (ref 32–36)
MCHC RBC-ENTMCNC: 32.8 GM/DL — SIGNIFICANT CHANGE UP (ref 32–36)
MCHC RBC-ENTMCNC: 32.9 G/DL — SIGNIFICANT CHANGE UP (ref 32–36)
MCHC RBC-ENTMCNC: 32.9 GM/DL — SIGNIFICANT CHANGE UP (ref 32–36)
MCHC RBC-ENTMCNC: 33 GM/DL — SIGNIFICANT CHANGE UP (ref 32–36)
MCHC RBC-ENTMCNC: 33 GM/DL — SIGNIFICANT CHANGE UP (ref 32–36)
MCHC RBC-ENTMCNC: 33 PG — SIGNIFICANT CHANGE UP (ref 27–34)
MCHC RBC-ENTMCNC: 33.2 G/DL — SIGNIFICANT CHANGE UP (ref 32–36)
MCHC RBC-ENTMCNC: 33.2 GM/DL — SIGNIFICANT CHANGE UP (ref 32–36)
MCHC RBC-ENTMCNC: 33.7 G/DL — SIGNIFICANT CHANGE UP (ref 32–36)
MCHC RBC-ENTMCNC: 33.7 GM/DL — SIGNIFICANT CHANGE UP (ref 32–36)
MCHC RBC-ENTMCNC: 33.9 G/DL — SIGNIFICANT CHANGE UP (ref 32–36)
MCHC RBC-ENTMCNC: 34.9 G/DL — SIGNIFICANT CHANGE UP (ref 32–36)
MCV RBC AUTO: 100.4 FL — HIGH (ref 80–100)
MCV RBC AUTO: 100.7 FL — HIGH (ref 80–100)
MCV RBC AUTO: 100.8 FL — HIGH (ref 80–100)
MCV RBC AUTO: 100.9 FL — HIGH (ref 80–100)
MCV RBC AUTO: 100.9 FL — HIGH (ref 80–100)
MCV RBC AUTO: 101 FL — HIGH (ref 80–100)
MCV RBC AUTO: 101.1 FL — HIGH (ref 80–100)
MCV RBC AUTO: 101.7 FL — HIGH (ref 80–100)
MCV RBC AUTO: 102 FL — HIGH (ref 80–100)
MCV RBC AUTO: 102.5 FL — HIGH (ref 80–100)
MCV RBC AUTO: 102.8 FL — HIGH (ref 80–100)
MCV RBC AUTO: 103.6 FL — HIGH (ref 80–100)
MCV RBC AUTO: 103.7 FL — HIGH (ref 80–100)
MCV RBC AUTO: 88.1 FL — SIGNIFICANT CHANGE UP (ref 80–100)
MCV RBC AUTO: 89.3 FL — SIGNIFICANT CHANGE UP (ref 80–100)
MCV RBC AUTO: 90.2 FL — SIGNIFICANT CHANGE UP (ref 80–100)
MCV RBC AUTO: 90.3 FL — SIGNIFICANT CHANGE UP (ref 80–100)
MCV RBC AUTO: 90.7 FL — SIGNIFICANT CHANGE UP (ref 80–100)
MCV RBC AUTO: 92.7 FL — SIGNIFICANT CHANGE UP (ref 80–100)
MCV RBC AUTO: 93.8 FL — SIGNIFICANT CHANGE UP (ref 80–100)
MCV RBC AUTO: 94.4 FL — SIGNIFICANT CHANGE UP (ref 80–100)
MCV RBC AUTO: 95.7 FL — SIGNIFICANT CHANGE UP (ref 80–100)
MCV RBC AUTO: 95.8 FL — SIGNIFICANT CHANGE UP (ref 80–100)
MCV RBC AUTO: 95.9 FL — SIGNIFICANT CHANGE UP (ref 80–100)
MCV RBC AUTO: 96.5 FL — SIGNIFICANT CHANGE UP (ref 80–100)
MCV RBC AUTO: 96.5 FL — SIGNIFICANT CHANGE UP (ref 80–100)
MCV RBC AUTO: 96.9 FL — SIGNIFICANT CHANGE UP (ref 80–100)
MCV RBC AUTO: 96.9 FL — SIGNIFICANT CHANGE UP (ref 80–100)
MCV RBC AUTO: 97.2 FL — SIGNIFICANT CHANGE UP (ref 80–100)
MCV RBC AUTO: 97.9 FL — SIGNIFICANT CHANGE UP (ref 80–100)
MICROCYTES BLD QL: SLIGHT — SIGNIFICANT CHANGE UP
MONOCYTES # BLD AUTO: 0.16 K/UL — SIGNIFICANT CHANGE UP (ref 0–0.9)
MONOCYTES # BLD AUTO: 0.18 K/UL — SIGNIFICANT CHANGE UP (ref 0–0.9)
MONOCYTES # BLD AUTO: 0.27 K/UL — SIGNIFICANT CHANGE UP (ref 0–0.9)
MONOCYTES # BLD AUTO: 0.29 K/UL — SIGNIFICANT CHANGE UP (ref 0–0.9)
MONOCYTES # BLD AUTO: 0.3 K/UL — SIGNIFICANT CHANGE UP (ref 0–0.9)
MONOCYTES # BLD AUTO: 0.4 K/UL — SIGNIFICANT CHANGE UP (ref 0–0.9)
MONOCYTES # BLD AUTO: 0.46 K/UL — SIGNIFICANT CHANGE UP (ref 0–0.9)
MONOCYTES # BLD AUTO: 0.5 K/UL — SIGNIFICANT CHANGE UP (ref 0–0.9)
MONOCYTES # BLD AUTO: 0.55 K/UL — SIGNIFICANT CHANGE UP (ref 0–0.9)
MONOCYTES # BLD AUTO: 0.55 K/UL — SIGNIFICANT CHANGE UP (ref 0–0.9)
MONOCYTES # BLD AUTO: 0.56 K/UL — SIGNIFICANT CHANGE UP (ref 0–0.9)
MONOCYTES # BLD AUTO: 0.58 K/UL — SIGNIFICANT CHANGE UP (ref 0–0.9)
MONOCYTES # BLD AUTO: 0.59 K/UL — SIGNIFICANT CHANGE UP (ref 0–0.9)
MONOCYTES # BLD AUTO: 0.6 K/UL — SIGNIFICANT CHANGE UP (ref 0–0.9)
MONOCYTES # BLD AUTO: 0.6 K/UL — SIGNIFICANT CHANGE UP (ref 0–0.9)
MONOCYTES # BLD AUTO: 0.65 K/UL — SIGNIFICANT CHANGE UP (ref 0–0.9)
MONOCYTES # BLD AUTO: 0.78 K/UL — SIGNIFICANT CHANGE UP (ref 0–0.9)
MONOCYTES # BLD AUTO: 0.8 K/UL — SIGNIFICANT CHANGE UP (ref 0–0.9)
MONOCYTES # BLD AUTO: 0.84 K/UL — SIGNIFICANT CHANGE UP (ref 0–0.9)
MONOCYTES NFR BLD AUTO: 1.8 % — LOW (ref 2–14)
MONOCYTES NFR BLD AUTO: 2.6 % — SIGNIFICANT CHANGE UP (ref 2–14)
MONOCYTES NFR BLD AUTO: 2.6 % — SIGNIFICANT CHANGE UP (ref 2–14)
MONOCYTES NFR BLD AUTO: 2.9 % — SIGNIFICANT CHANGE UP (ref 2–14)
MONOCYTES NFR BLD AUTO: 3.5 % — SIGNIFICANT CHANGE UP (ref 2–14)
MONOCYTES NFR BLD AUTO: 4.4 % — SIGNIFICANT CHANGE UP (ref 2–14)
MONOCYTES NFR BLD AUTO: 5.7 % — SIGNIFICANT CHANGE UP (ref 2–14)
MONOCYTES NFR BLD AUTO: 6.1 % — SIGNIFICANT CHANGE UP (ref 2–14)
MONOCYTES NFR BLD AUTO: 6.1 % — SIGNIFICANT CHANGE UP (ref 2–14)
MONOCYTES NFR BLD AUTO: 6.5 % — SIGNIFICANT CHANGE UP (ref 2–14)
MONOCYTES NFR BLD AUTO: 7 % — SIGNIFICANT CHANGE UP (ref 2–14)
MONOCYTES NFR BLD AUTO: 7.2 % — SIGNIFICANT CHANGE UP (ref 2–14)
MONOCYTES NFR BLD AUTO: 7.5 % — SIGNIFICANT CHANGE UP (ref 2–14)
MONOCYTES NFR BLD AUTO: 7.6 % — SIGNIFICANT CHANGE UP (ref 2–14)
MONOCYTES NFR BLD AUTO: 7.8 % — SIGNIFICANT CHANGE UP (ref 2–14)
MONOCYTES NFR BLD AUTO: 8.4 % — SIGNIFICANT CHANGE UP (ref 2–14)
MONOCYTES NFR BLD AUTO: 8.5 % — SIGNIFICANT CHANGE UP (ref 2–14)
MONOCYTES NFR BLD AUTO: 8.8 % — SIGNIFICANT CHANGE UP (ref 2–14)
MONOCYTES NFR BLD AUTO: 8.9 % — SIGNIFICANT CHANGE UP (ref 2–14)
MONOCYTES NFR BLD AUTO: 9.3 % — SIGNIFICANT CHANGE UP (ref 2–14)
MONOCYTES NFR BLD AUTO: 9.7 % — SIGNIFICANT CHANGE UP (ref 2–14)
NEUTROPHILS # BLD AUTO: 10.34 K/UL — HIGH (ref 1.8–7.4)
NEUTROPHILS # BLD AUTO: 11.03 K/UL — HIGH (ref 1.8–7.4)
NEUTROPHILS # BLD AUTO: 13.65 K/UL — HIGH (ref 1.8–7.4)
NEUTROPHILS # BLD AUTO: 14.49 K/UL — HIGH (ref 1.8–7.4)
NEUTROPHILS # BLD AUTO: 3.14 K/UL — SIGNIFICANT CHANGE UP (ref 1.8–7.4)
NEUTROPHILS # BLD AUTO: 3.46 K/UL — SIGNIFICANT CHANGE UP (ref 1.8–7.4)
NEUTROPHILS # BLD AUTO: 3.53 K/UL — SIGNIFICANT CHANGE UP (ref 1.8–7.4)
NEUTROPHILS # BLD AUTO: 3.84 K/UL — SIGNIFICANT CHANGE UP (ref 1.8–7.4)
NEUTROPHILS # BLD AUTO: 4.26 K/UL — SIGNIFICANT CHANGE UP (ref 1.8–7.4)
NEUTROPHILS # BLD AUTO: 4.49 K/UL — SIGNIFICANT CHANGE UP (ref 1.8–7.4)
NEUTROPHILS # BLD AUTO: 5.3 K/UL — SIGNIFICANT CHANGE UP (ref 1.8–7.4)
NEUTROPHILS # BLD AUTO: 5.4 K/UL — SIGNIFICANT CHANGE UP (ref 1.8–7.4)
NEUTROPHILS # BLD AUTO: 5.42 K/UL — SIGNIFICANT CHANGE UP (ref 1.8–7.4)
NEUTROPHILS # BLD AUTO: 5.7 K/UL — SIGNIFICANT CHANGE UP (ref 1.8–7.4)
NEUTROPHILS # BLD AUTO: 6.11 K/UL — SIGNIFICANT CHANGE UP (ref 1.8–7.4)
NEUTROPHILS # BLD AUTO: 6.2 K/UL — SIGNIFICANT CHANGE UP (ref 1.8–7.4)
NEUTROPHILS # BLD AUTO: 6.4 K/UL — SIGNIFICANT CHANGE UP (ref 1.8–7.4)
NEUTROPHILS # BLD AUTO: 6.46 K/UL — SIGNIFICANT CHANGE UP (ref 1.8–7.4)
NEUTROPHILS # BLD AUTO: 7.15 K/UL — SIGNIFICANT CHANGE UP (ref 1.8–7.4)
NEUTROPHILS # BLD AUTO: 7.98 K/UL — HIGH (ref 1.8–7.4)
NEUTROPHILS # BLD AUTO: 8 K/UL — HIGH (ref 1.8–7.4)
NEUTROPHILS NFR BLD AUTO: 58.1 % — SIGNIFICANT CHANGE UP (ref 43–77)
NEUTROPHILS NFR BLD AUTO: 58.9 % — SIGNIFICANT CHANGE UP (ref 43–77)
NEUTROPHILS NFR BLD AUTO: 64.7 % — SIGNIFICANT CHANGE UP (ref 43–77)
NEUTROPHILS NFR BLD AUTO: 65.4 % — SIGNIFICANT CHANGE UP (ref 43–77)
NEUTROPHILS NFR BLD AUTO: 65.6 % — SIGNIFICANT CHANGE UP (ref 43–77)
NEUTROPHILS NFR BLD AUTO: 67 % — SIGNIFICANT CHANGE UP (ref 43–77)
NEUTROPHILS NFR BLD AUTO: 67.9 % — SIGNIFICANT CHANGE UP (ref 43–77)
NEUTROPHILS NFR BLD AUTO: 71.4 % — SIGNIFICANT CHANGE UP (ref 43–77)
NEUTROPHILS NFR BLD AUTO: 73.8 % — SIGNIFICANT CHANGE UP (ref 43–77)
NEUTROPHILS NFR BLD AUTO: 77.1 % — HIGH (ref 43–77)
NEUTROPHILS NFR BLD AUTO: 77.4 % — HIGH (ref 43–77)
NEUTROPHILS NFR BLD AUTO: 77.5 % — HIGH (ref 43–77)
NEUTROPHILS NFR BLD AUTO: 78.5 % — HIGH (ref 43–77)
NEUTROPHILS NFR BLD AUTO: 80.1 % — HIGH (ref 43–77)
NEUTROPHILS NFR BLD AUTO: 80.2 % — HIGH (ref 43–77)
NEUTROPHILS NFR BLD AUTO: 83.6 % — HIGH (ref 43–77)
NEUTROPHILS NFR BLD AUTO: 87.2 % — HIGH (ref 43–77)
NEUTROPHILS NFR BLD AUTO: 88.5 % — HIGH (ref 43–77)
NEUTROPHILS NFR BLD AUTO: 90.4 % — HIGH (ref 43–77)
NEUTROPHILS NFR BLD AUTO: 92.1 % — HIGH (ref 43–77)
NEUTROPHILS NFR BLD AUTO: 93.9 % — HIGH (ref 43–77)
NITRITE UR-MCNC: NEGATIVE — SIGNIFICANT CHANGE UP
NT-PROBNP SERPL-SCNC: 338 PG/ML — HIGH (ref 0–300)
OB PNL STL: POSITIVE
OVALOCYTES BLD QL SMEAR: SLIGHT — SIGNIFICANT CHANGE UP
PH UR: 5 — SIGNIFICANT CHANGE UP (ref 5–8)
PH UR: 6 — SIGNIFICANT CHANGE UP (ref 5–8)
PH UR: 6.5 — SIGNIFICANT CHANGE UP (ref 5–8)
PH UR: 6.5 — SIGNIFICANT CHANGE UP (ref 5–8)
PHOSPHATE SERPL-MCNC: 3.2 MG/DL — SIGNIFICANT CHANGE UP (ref 2.4–4.7)
PHOSPHATE SERPL-MCNC: 3.5 MG/DL — SIGNIFICANT CHANGE UP (ref 2.4–4.7)
PHOSPHATE SERPL-MCNC: 3.5 MG/DL — SIGNIFICANT CHANGE UP (ref 2.4–4.7)
PHOSPHATE SERPL-MCNC: 3.6 MG/DL — SIGNIFICANT CHANGE UP (ref 2.4–4.7)
PHOSPHATE SERPL-MCNC: 3.6 MG/DL — SIGNIFICANT CHANGE UP (ref 2.4–4.7)
PLAT MORPH BLD: NORMAL — SIGNIFICANT CHANGE UP
PLATELET # BLD AUTO: 133 K/UL — LOW (ref 150–400)
PLATELET # BLD AUTO: 185 K/UL — SIGNIFICANT CHANGE UP (ref 150–400)
PLATELET # BLD AUTO: 198 K/UL — SIGNIFICANT CHANGE UP (ref 150–400)
PLATELET # BLD AUTO: 207 K/UL — SIGNIFICANT CHANGE UP (ref 150–400)
PLATELET # BLD AUTO: 233 K/UL — SIGNIFICANT CHANGE UP (ref 150–400)
PLATELET # BLD AUTO: 236 K/UL — SIGNIFICANT CHANGE UP (ref 150–400)
PLATELET # BLD AUTO: 237 K/UL — SIGNIFICANT CHANGE UP (ref 150–400)
PLATELET # BLD AUTO: 245 K/UL — SIGNIFICANT CHANGE UP (ref 150–400)
PLATELET # BLD AUTO: 255 K/UL — SIGNIFICANT CHANGE UP (ref 150–400)
PLATELET # BLD AUTO: 256 K/UL — SIGNIFICANT CHANGE UP (ref 150–400)
PLATELET # BLD AUTO: 268 K/UL — SIGNIFICANT CHANGE UP (ref 150–400)
PLATELET # BLD AUTO: 270 K/UL — SIGNIFICANT CHANGE UP (ref 150–400)
PLATELET # BLD AUTO: 271 K/UL — SIGNIFICANT CHANGE UP (ref 150–400)
PLATELET # BLD AUTO: 282 K/UL — SIGNIFICANT CHANGE UP (ref 150–400)
PLATELET # BLD AUTO: 286 K/UL — SIGNIFICANT CHANGE UP (ref 150–400)
PLATELET # BLD AUTO: 289 K/UL — SIGNIFICANT CHANGE UP (ref 150–400)
PLATELET # BLD AUTO: 290 K/UL — SIGNIFICANT CHANGE UP (ref 150–400)
PLATELET # BLD AUTO: 301 K/UL — SIGNIFICANT CHANGE UP (ref 150–400)
PLATELET # BLD AUTO: 307 K/UL — SIGNIFICANT CHANGE UP (ref 150–400)
PLATELET # BLD AUTO: 310 K/UL — SIGNIFICANT CHANGE UP (ref 150–400)
PLATELET # BLD AUTO: 316 K/UL — SIGNIFICANT CHANGE UP (ref 150–400)
PLATELET # BLD AUTO: 319 K/UL — SIGNIFICANT CHANGE UP (ref 150–400)
PLATELET # BLD AUTO: 325 K/UL — SIGNIFICANT CHANGE UP (ref 150–400)
PLATELET # BLD AUTO: 327 K/UL — SIGNIFICANT CHANGE UP (ref 150–400)
PLATELET # BLD AUTO: 330 K/UL — SIGNIFICANT CHANGE UP (ref 150–400)
PLATELET # BLD AUTO: 350 K/UL — SIGNIFICANT CHANGE UP (ref 150–400)
PLATELET # BLD AUTO: 353 K/UL — SIGNIFICANT CHANGE UP (ref 150–400)
PLATELET # BLD AUTO: 356 K/UL — SIGNIFICANT CHANGE UP (ref 150–400)
PLATELET # BLD AUTO: 357 K/UL — SIGNIFICANT CHANGE UP (ref 150–400)
PLATELET # BLD AUTO: 383 K/UL — SIGNIFICANT CHANGE UP (ref 150–400)
PLATELET # BLD AUTO: 402 K/UL — HIGH (ref 150–400)
PLATELET # BLD AUTO: 444 K/UL — HIGH (ref 150–400)
PLATELET COUNT - ESTIMATE: NORMAL — SIGNIFICANT CHANGE UP
PLATELET COUNT - ESTIMATE: NORMAL — SIGNIFICANT CHANGE UP
POIKILOCYTOSIS BLD QL AUTO: SLIGHT — SIGNIFICANT CHANGE UP
POLYCHROMASIA BLD QL SMEAR: SLIGHT — SIGNIFICANT CHANGE UP
POTASSIUM SERPL-MCNC: 3.7 MMOL/L — SIGNIFICANT CHANGE UP (ref 3.5–5.3)
POTASSIUM SERPL-MCNC: 3.9 MMOL/L — SIGNIFICANT CHANGE UP (ref 3.5–5.3)
POTASSIUM SERPL-MCNC: 3.9 MMOL/L — SIGNIFICANT CHANGE UP (ref 3.5–5.3)
POTASSIUM SERPL-MCNC: 4 MMOL/L — SIGNIFICANT CHANGE UP (ref 3.5–5.3)
POTASSIUM SERPL-MCNC: 4.1 MMOL/L — SIGNIFICANT CHANGE UP (ref 3.5–5.3)
POTASSIUM SERPL-MCNC: 4.2 MMOL/L — SIGNIFICANT CHANGE UP (ref 3.5–5.3)
POTASSIUM SERPL-MCNC: 4.3 MMOL/L — SIGNIFICANT CHANGE UP (ref 3.5–5.3)
POTASSIUM SERPL-MCNC: 4.4 MMOL/L — SIGNIFICANT CHANGE UP (ref 3.5–5.3)
POTASSIUM SERPL-MCNC: 4.5 MMOL/L — SIGNIFICANT CHANGE UP (ref 3.5–5.3)
POTASSIUM SERPL-MCNC: 4.7 MMOL/L — SIGNIFICANT CHANGE UP (ref 3.5–5.3)
POTASSIUM SERPL-MCNC: 5 MMOL/L — SIGNIFICANT CHANGE UP (ref 3.5–5.3)
POTASSIUM SERPL-MCNC: 5 MMOL/L — SIGNIFICANT CHANGE UP (ref 3.5–5.3)
POTASSIUM SERPL-MCNC: 5.1 MMOL/L — SIGNIFICANT CHANGE UP (ref 3.5–5.3)
POTASSIUM SERPL-MCNC: 5.2 MMOL/L — SIGNIFICANT CHANGE UP (ref 3.5–5.3)
POTASSIUM SERPL-MCNC: 5.4 MMOL/L — HIGH (ref 3.5–5.3)
POTASSIUM SERPL-SCNC: 3.7 MMOL/L — SIGNIFICANT CHANGE UP (ref 3.5–5.3)
POTASSIUM SERPL-SCNC: 3.9 MMOL/L — SIGNIFICANT CHANGE UP (ref 3.5–5.3)
POTASSIUM SERPL-SCNC: 3.9 MMOL/L — SIGNIFICANT CHANGE UP (ref 3.5–5.3)
POTASSIUM SERPL-SCNC: 4 MMOL/L — SIGNIFICANT CHANGE UP (ref 3.5–5.3)
POTASSIUM SERPL-SCNC: 4.1 MMOL/L — SIGNIFICANT CHANGE UP (ref 3.5–5.3)
POTASSIUM SERPL-SCNC: 4.2 MMOL/L — SIGNIFICANT CHANGE UP (ref 3.5–5.3)
POTASSIUM SERPL-SCNC: 4.3 MMOL/L — SIGNIFICANT CHANGE UP (ref 3.5–5.3)
POTASSIUM SERPL-SCNC: 4.4 MMOL/L — SIGNIFICANT CHANGE UP (ref 3.5–5.3)
POTASSIUM SERPL-SCNC: 4.5 MMOL/L — SIGNIFICANT CHANGE UP (ref 3.5–5.3)
POTASSIUM SERPL-SCNC: 4.7 MMOL/L — SIGNIFICANT CHANGE UP (ref 3.5–5.3)
POTASSIUM SERPL-SCNC: 5 MMOL/L
POTASSIUM SERPL-SCNC: 5 MMOL/L — SIGNIFICANT CHANGE UP (ref 3.5–5.3)
POTASSIUM SERPL-SCNC: 5 MMOL/L — SIGNIFICANT CHANGE UP (ref 3.5–5.3)
POTASSIUM SERPL-SCNC: 5.1 MMOL/L — SIGNIFICANT CHANGE UP (ref 3.5–5.3)
POTASSIUM SERPL-SCNC: 5.2 MMOL/L — SIGNIFICANT CHANGE UP (ref 3.5–5.3)
POTASSIUM SERPL-SCNC: 5.4 MMOL/L — HIGH (ref 3.5–5.3)
POTASSIUM SERPL-SCNC: 5.5 MMOL/L
PROCALCITONIN SERPL-MCNC: 0.26 NG/ML — HIGH (ref 0.02–0.1)
PROCALCITONIN SERPL-MCNC: 0.31 NG/ML — HIGH (ref 0.02–0.1)
PROCALCITONIN SERPL-MCNC: 10.48 NG/ML — HIGH (ref 0.02–0.1)
PROCALCITONIN SERPL-MCNC: 3.17 NG/ML — HIGH (ref 0.02–0.1)
PROT SERPL-MCNC: 6 G/DL — LOW (ref 6.6–8.7)
PROT SERPL-MCNC: 6.5 G/DL — LOW (ref 6.6–8.7)
PROT SERPL-MCNC: 6.8 G/DL — SIGNIFICANT CHANGE UP (ref 6.6–8.7)
PROT SERPL-MCNC: 6.8 G/DL — SIGNIFICANT CHANGE UP (ref 6.6–8.7)
PROT SERPL-MCNC: 7 G/DL — SIGNIFICANT CHANGE UP (ref 6.6–8.7)
PROT SERPL-MCNC: 7.1 G/DL — SIGNIFICANT CHANGE UP (ref 6.6–8.7)
PROT SERPL-MCNC: 7.1 G/DL — SIGNIFICANT CHANGE UP (ref 6.6–8.7)
PROT SERPL-MCNC: 7.3 G/DL
PROT SERPL-MCNC: 7.4 G/DL — SIGNIFICANT CHANGE UP (ref 6.6–8.7)
PROT SERPL-MCNC: 7.4 G/DL — SIGNIFICANT CHANGE UP (ref 6.6–8.7)
PROT SERPL-MCNC: 7.6 G/DL
PROT SERPL-MCNC: 7.8 G/DL — SIGNIFICANT CHANGE UP (ref 6.6–8.7)
PROT SERPL-MCNC: 8.2 G/DL — SIGNIFICANT CHANGE UP (ref 6.6–8.7)
PROT SERPL-MCNC: 8.3 G/DL — SIGNIFICANT CHANGE UP (ref 6.6–8.7)
PROT UR-MCNC: 30 MG/DL
PROTHROM AB SERPL-ACNC: 10.9 SEC — SIGNIFICANT CHANGE UP (ref 10–12.9)
PROTHROM AB SERPL-ACNC: 12.3 SEC — SIGNIFICANT CHANGE UP (ref 10.6–13.6)
PROTHROM AB SERPL-ACNC: 12.8 SEC — SIGNIFICANT CHANGE UP (ref 10.6–13.6)
PROTHROM AB SERPL-ACNC: 13.4 SEC — SIGNIFICANT CHANGE UP (ref 10.6–13.6)
PROTHROM AB SERPL-ACNC: 13.6 SEC — SIGNIFICANT CHANGE UP (ref 10.6–13.6)
PROTHROM AB SERPL-ACNC: 13.6 SEC — SIGNIFICANT CHANGE UP (ref 10.6–13.6)
PT BLD: 11.2 SEC
PT BLD: 11.4 SEC
RBC # BLD: 1.78 M/UL — LOW (ref 4.2–5.8)
RBC # BLD: 2.06 M/UL — LOW (ref 4.2–5.8)
RBC # BLD: 2.19 M/UL — LOW (ref 4.2–5.8)
RBC # BLD: 2.28 M/UL — LOW (ref 4.2–5.8)
RBC # BLD: 2.29 M/UL — LOW (ref 4.2–5.8)
RBC # BLD: 2.3 M/UL — LOW (ref 4.2–5.8)
RBC # BLD: 2.42 M/UL — LOW (ref 4.2–5.8)
RBC # BLD: 2.5 M/UL — LOW (ref 4.2–5.8)
RBC # BLD: 2.52 M/UL — LOW (ref 4.2–5.8)
RBC # BLD: 2.52 M/UL — LOW (ref 4.2–5.8)
RBC # BLD: 2.55 M/UL — LOW (ref 4.2–5.8)
RBC # BLD: 2.59 M/UL — LOW (ref 4.2–5.8)
RBC # BLD: 2.62 M/UL — LOW (ref 4.2–5.8)
RBC # BLD: 2.63 M/UL — LOW (ref 4.2–5.8)
RBC # BLD: 2.67 M/UL — LOW (ref 4.2–5.8)
RBC # BLD: 2.71 M/UL — LOW (ref 4.2–5.8)
RBC # BLD: 2.75 M/UL — LOW (ref 4.2–5.8)
RBC # BLD: 2.76 M/UL — LOW (ref 4.2–5.8)
RBC # BLD: 2.84 M/UL — LOW (ref 4.2–5.8)
RBC # BLD: 2.86 M/UL — LOW (ref 4.2–5.8)
RBC # BLD: 2.89 M/UL — LOW (ref 4.2–5.8)
RBC # BLD: 2.89 M/UL — LOW (ref 4.2–5.8)
RBC # BLD: 2.92 M/UL — LOW (ref 4.2–5.8)
RBC # BLD: 2.95 M/UL — LOW (ref 4.2–5.8)
RBC # BLD: 3.03 M/UL — LOW (ref 4.2–5.8)
RBC # BLD: 3.03 M/UL — LOW (ref 4.2–5.8)
RBC # BLD: 3.11 M/UL — LOW (ref 4.2–5.8)
RBC # BLD: 3.12 M/UL — LOW (ref 4.2–5.8)
RBC # BLD: 3.16 M/UL — LOW (ref 4.2–5.8)
RBC # BLD: 3.31 M/UL — LOW (ref 4.2–5.8)
RBC # BLD: 3.45 M/UL — LOW (ref 4.2–5.8)
RBC # BLD: 3.78 M/UL — LOW (ref 4.2–5.8)
RBC # FLD: 10.8 % — SIGNIFICANT CHANGE UP (ref 10.3–14.5)
RBC # FLD: 10.8 % — SIGNIFICANT CHANGE UP (ref 10.3–14.5)
RBC # FLD: 11 % — SIGNIFICANT CHANGE UP (ref 10.3–14.5)
RBC # FLD: 11.9 % — SIGNIFICANT CHANGE UP (ref 10.3–14.5)
RBC # FLD: 11.9 % — SIGNIFICANT CHANGE UP (ref 10.3–14.5)
RBC # FLD: 12.3 % — SIGNIFICANT CHANGE UP (ref 10.3–14.5)
RBC # FLD: 12.3 % — SIGNIFICANT CHANGE UP (ref 10.3–14.5)
RBC # FLD: 13.3 % — SIGNIFICANT CHANGE UP (ref 10.3–14.5)
RBC # FLD: 13.6 % — SIGNIFICANT CHANGE UP (ref 10.3–14.5)
RBC # FLD: 14 % — SIGNIFICANT CHANGE UP (ref 10.3–14.5)
RBC # FLD: 14.1 % — SIGNIFICANT CHANGE UP (ref 10.3–14.5)
RBC # FLD: 14.2 % — SIGNIFICANT CHANGE UP (ref 10.3–14.5)
RBC # FLD: 14.3 % — SIGNIFICANT CHANGE UP (ref 10.3–14.5)
RBC # FLD: 14.4 % — SIGNIFICANT CHANGE UP (ref 10.3–14.5)
RBC # FLD: 14.4 % — SIGNIFICANT CHANGE UP (ref 10.3–14.5)
RBC # FLD: 14.5 % — SIGNIFICANT CHANGE UP (ref 10.3–14.5)
RBC # FLD: 14.6 % — HIGH (ref 10.3–14.5)
RBC # FLD: 15.9 % — HIGH (ref 10.3–14.5)
RBC BLD AUTO: ABNORMAL
RBC BLD AUTO: ABNORMAL
RBC BLD AUTO: NORMAL — SIGNIFICANT CHANGE UP
RBC BLD AUTO: NORMAL — SIGNIFICANT CHANGE UP
RBC CASTS # UR COMP ASSIST: NEGATIVE /HPF — SIGNIFICANT CHANGE UP (ref 0–4)
RBC CASTS # UR COMP ASSIST: SIGNIFICANT CHANGE UP /HPF (ref 0–4)
RBC CASTS # UR COMP ASSIST: SIGNIFICANT CHANGE UP /HPF (ref 0–4)
SARS-COV-2 IGG SERPL QL IA: NEGATIVE — SIGNIFICANT CHANGE UP
SARS-COV-2 IGG SERPL QL IA: POSITIVE
SARS-COV-2 IGG SERPL QL IA: POSITIVE
SARS-COV-2 IGM SERPL IA-ACNC: 0.08 INDEX — SIGNIFICANT CHANGE UP
SARS-COV-2 IGM SERPL IA-ACNC: 0.09 INDEX — SIGNIFICANT CHANGE UP
SARS-COV-2 IGM SERPL IA-ACNC: 0.16 INDEX — SIGNIFICANT CHANGE UP
SARS-COV-2 IGM SERPL IA-ACNC: 21 AU/ML — HIGH
SARS-COV-2 IGM SERPL IA-ACNC: 26.2 AU/ML — HIGH
SARS-COV-2 N GENE NPH QL NAA+PROBE: NOT DETECTED
SARS-COV-2 RNA SPEC QL NAA+PROBE: SIGNIFICANT CHANGE UP
SODIUM SERPL-SCNC: 128 MMOL/L — LOW (ref 135–145)
SODIUM SERPL-SCNC: 131 MMOL/L — LOW (ref 135–145)
SODIUM SERPL-SCNC: 132 MMOL/L — LOW (ref 135–145)
SODIUM SERPL-SCNC: 133 MMOL/L — LOW (ref 135–145)
SODIUM SERPL-SCNC: 133 MMOL/L — LOW (ref 135–145)
SODIUM SERPL-SCNC: 134 MMOL/L — LOW (ref 135–145)
SODIUM SERPL-SCNC: 135 MMOL/L — SIGNIFICANT CHANGE UP (ref 135–145)
SODIUM SERPL-SCNC: 135 MMOL/L — SIGNIFICANT CHANGE UP (ref 135–145)
SODIUM SERPL-SCNC: 136 MMOL/L
SODIUM SERPL-SCNC: 136 MMOL/L — SIGNIFICANT CHANGE UP (ref 135–145)
SODIUM SERPL-SCNC: 137 MMOL/L
SODIUM SERPL-SCNC: 137 MMOL/L — SIGNIFICANT CHANGE UP (ref 135–145)
SODIUM SERPL-SCNC: 138 MMOL/L — SIGNIFICANT CHANGE UP (ref 135–145)
SP GR SPEC: 1 — LOW (ref 1.01–1.02)
SP GR SPEC: 1.01 — SIGNIFICANT CHANGE UP (ref 1.01–1.02)
SPECIMEN SOURCE: SIGNIFICANT CHANGE UP
SPHEROCYTES BLD QL SMEAR: SLIGHT — SIGNIFICANT CHANGE UP
SURGICAL PATHOLOGY STUDY: SIGNIFICANT CHANGE UP
SURGICAL PATHOLOGY STUDY: SIGNIFICANT CHANGE UP
TROPONIN T SERPL-MCNC: <0.01 NG/ML — SIGNIFICANT CHANGE UP (ref 0–0.06)
TROPONIN T SERPL-MCNC: <0.01 NG/ML — SIGNIFICANT CHANGE UP (ref 0–0.06)
UROBILINOGEN FLD QL: NEGATIVE MG/DL — SIGNIFICANT CHANGE UP
VANCOMYCIN TROUGH SERPL-MCNC: 9.4 UG/ML — LOW (ref 10–20)
VARIANT LYMPHS # BLD: 0.9 % — SIGNIFICANT CHANGE UP (ref 0–6)
WBC # BLD: 10 K/UL — SIGNIFICANT CHANGE UP (ref 3.8–10.5)
WBC # BLD: 10.5 K/UL — SIGNIFICANT CHANGE UP (ref 3.8–10.5)
WBC # BLD: 11.44 K/UL — HIGH (ref 3.8–10.5)
WBC # BLD: 13.08 K/UL — HIGH (ref 3.8–10.5)
WBC # BLD: 13.78 K/UL — HIGH (ref 3.8–10.5)
WBC # BLD: 14.82 K/UL — HIGH (ref 3.8–10.5)
WBC # BLD: 15.43 K/UL — HIGH (ref 3.8–10.5)
WBC # BLD: 5.09 K/UL — SIGNIFICANT CHANGE UP (ref 3.8–10.5)
WBC # BLD: 5.37 K/UL — SIGNIFICANT CHANGE UP (ref 3.8–10.5)
WBC # BLD: 5.4 K/UL — SIGNIFICANT CHANGE UP (ref 3.8–10.5)
WBC # BLD: 6.21 K/UL — SIGNIFICANT CHANGE UP (ref 3.8–10.5)
WBC # BLD: 6.29 K/UL — SIGNIFICANT CHANGE UP (ref 3.8–10.5)
WBC # BLD: 6.34 K/UL — SIGNIFICANT CHANGE UP (ref 3.8–10.5)
WBC # BLD: 6.39 K/UL — SIGNIFICANT CHANGE UP (ref 3.8–10.5)
WBC # BLD: 6.52 K/UL — SIGNIFICANT CHANGE UP (ref 3.8–10.5)
WBC # BLD: 6.58 K/UL — SIGNIFICANT CHANGE UP (ref 3.8–10.5)
WBC # BLD: 6.77 K/UL — SIGNIFICANT CHANGE UP (ref 3.8–10.5)
WBC # BLD: 7.19 K/UL — SIGNIFICANT CHANGE UP (ref 3.8–10.5)
WBC # BLD: 7.2 K/UL — SIGNIFICANT CHANGE UP (ref 3.8–10.5)
WBC # BLD: 7.22 K/UL — SIGNIFICANT CHANGE UP (ref 3.8–10.5)
WBC # BLD: 7.3 K/UL — SIGNIFICANT CHANGE UP (ref 3.8–10.5)
WBC # BLD: 7.53 K/UL — SIGNIFICANT CHANGE UP (ref 3.8–10.5)
WBC # BLD: 7.73 K/UL — SIGNIFICANT CHANGE UP (ref 3.8–10.5)
WBC # BLD: 7.88 K/UL — SIGNIFICANT CHANGE UP (ref 3.8–10.5)
WBC # BLD: 8 K/UL — SIGNIFICANT CHANGE UP (ref 3.8–10.5)
WBC # BLD: 8.06 K/UL — SIGNIFICANT CHANGE UP (ref 3.8–10.5)
WBC # BLD: 8.47 K/UL — SIGNIFICANT CHANGE UP (ref 3.8–10.5)
WBC # BLD: 8.52 K/UL — SIGNIFICANT CHANGE UP (ref 3.8–10.5)
WBC # BLD: 9.02 K/UL — SIGNIFICANT CHANGE UP (ref 3.8–10.5)
WBC # BLD: 9.09 K/UL — SIGNIFICANT CHANGE UP (ref 3.8–10.5)
WBC # BLD: 9.23 K/UL — SIGNIFICANT CHANGE UP (ref 3.8–10.5)
WBC # BLD: 9.7 K/UL — SIGNIFICANT CHANGE UP (ref 3.8–10.5)
WBC # FLD AUTO: 10 K/UL — SIGNIFICANT CHANGE UP (ref 3.8–10.5)
WBC # FLD AUTO: 10.5 K/UL — SIGNIFICANT CHANGE UP (ref 3.8–10.5)
WBC # FLD AUTO: 11.44 K/UL — HIGH (ref 3.8–10.5)
WBC # FLD AUTO: 13.08 K/UL — HIGH (ref 3.8–10.5)
WBC # FLD AUTO: 13.78 K/UL — HIGH (ref 3.8–10.5)
WBC # FLD AUTO: 14.82 K/UL — HIGH (ref 3.8–10.5)
WBC # FLD AUTO: 15.43 K/UL — HIGH (ref 3.8–10.5)
WBC # FLD AUTO: 5.09 K/UL — SIGNIFICANT CHANGE UP (ref 3.8–10.5)
WBC # FLD AUTO: 5.37 K/UL — SIGNIFICANT CHANGE UP (ref 3.8–10.5)
WBC # FLD AUTO: 5.4 K/UL — SIGNIFICANT CHANGE UP (ref 3.8–10.5)
WBC # FLD AUTO: 6.21 K/UL — SIGNIFICANT CHANGE UP (ref 3.8–10.5)
WBC # FLD AUTO: 6.29 K/UL — SIGNIFICANT CHANGE UP (ref 3.8–10.5)
WBC # FLD AUTO: 6.34 K/UL — SIGNIFICANT CHANGE UP (ref 3.8–10.5)
WBC # FLD AUTO: 6.39 K/UL — SIGNIFICANT CHANGE UP (ref 3.8–10.5)
WBC # FLD AUTO: 6.52 K/UL — SIGNIFICANT CHANGE UP (ref 3.8–10.5)
WBC # FLD AUTO: 6.58 K/UL — SIGNIFICANT CHANGE UP (ref 3.8–10.5)
WBC # FLD AUTO: 6.77 K/UL — SIGNIFICANT CHANGE UP (ref 3.8–10.5)
WBC # FLD AUTO: 7.19 K/UL — SIGNIFICANT CHANGE UP (ref 3.8–10.5)
WBC # FLD AUTO: 7.2 K/UL — SIGNIFICANT CHANGE UP (ref 3.8–10.5)
WBC # FLD AUTO: 7.22 K/UL — SIGNIFICANT CHANGE UP (ref 3.8–10.5)
WBC # FLD AUTO: 7.3 K/UL — SIGNIFICANT CHANGE UP (ref 3.8–10.5)
WBC # FLD AUTO: 7.53 K/UL — SIGNIFICANT CHANGE UP (ref 3.8–10.5)
WBC # FLD AUTO: 7.73 K/UL — SIGNIFICANT CHANGE UP (ref 3.8–10.5)
WBC # FLD AUTO: 7.88 K/UL — SIGNIFICANT CHANGE UP (ref 3.8–10.5)
WBC # FLD AUTO: 8 K/UL — SIGNIFICANT CHANGE UP (ref 3.8–10.5)
WBC # FLD AUTO: 8.06 K/UL — SIGNIFICANT CHANGE UP (ref 3.8–10.5)
WBC # FLD AUTO: 8.47 K/UL — SIGNIFICANT CHANGE UP (ref 3.8–10.5)
WBC # FLD AUTO: 8.52 K/UL — SIGNIFICANT CHANGE UP (ref 3.8–10.5)
WBC # FLD AUTO: 9.02 K/UL — SIGNIFICANT CHANGE UP (ref 3.8–10.5)
WBC # FLD AUTO: 9.09 K/UL — SIGNIFICANT CHANGE UP (ref 3.8–10.5)
WBC # FLD AUTO: 9.23 K/UL — SIGNIFICANT CHANGE UP (ref 3.8–10.5)
WBC # FLD AUTO: 9.7 K/UL — SIGNIFICANT CHANGE UP (ref 3.8–10.5)
WBC UR QL: SIGNIFICANT CHANGE UP

## 2020-01-01 PROCEDURE — 71250 CT THORAX DX C-: CPT | Mod: 26

## 2020-01-01 PROCEDURE — 43239 EGD BIOPSY SINGLE/MULTIPLE: CPT

## 2020-01-01 PROCEDURE — 80053 COMPREHEN METABOLIC PANEL: CPT

## 2020-01-01 PROCEDURE — 74230 X-RAY XM SWLNG FUNCJ C+: CPT

## 2020-01-01 PROCEDURE — 85730 THROMBOPLASTIN TIME PARTIAL: CPT

## 2020-01-01 PROCEDURE — 71045 X-RAY EXAM CHEST 1 VIEW: CPT

## 2020-01-01 PROCEDURE — 99215 OFFICE O/P EST HI 40 MIN: CPT | Mod: 95

## 2020-01-01 PROCEDURE — 87040 BLOOD CULTURE FOR BACTERIA: CPT

## 2020-01-01 PROCEDURE — 86900 BLOOD TYPING SEROLOGIC ABO: CPT

## 2020-01-01 PROCEDURE — 36430 TRANSFUSION BLD/BLD COMPNT: CPT

## 2020-01-01 PROCEDURE — 93005 ELECTROCARDIOGRAM TRACING: CPT

## 2020-01-01 PROCEDURE — 85610 PROTHROMBIN TIME: CPT

## 2020-01-01 PROCEDURE — 99232 SBSQ HOSP IP/OBS MODERATE 35: CPT

## 2020-01-01 PROCEDURE — 99239 HOSP IP/OBS DSCHRG MGMT >30: CPT

## 2020-01-01 PROCEDURE — 36415 COLL VENOUS BLD VENIPUNCTURE: CPT

## 2020-01-01 PROCEDURE — 96365 THER/PROPH/DIAG IV INF INIT: CPT

## 2020-01-01 PROCEDURE — 96361 HYDRATE IV INFUSION ADD-ON: CPT

## 2020-01-01 PROCEDURE — 85027 COMPLETE CBC AUTOMATED: CPT

## 2020-01-01 PROCEDURE — 80048 BASIC METABOLIC PNL TOTAL CA: CPT

## 2020-01-01 PROCEDURE — 99215 OFFICE O/P EST HI 40 MIN: CPT

## 2020-01-01 PROCEDURE — 99223 1ST HOSP IP/OBS HIGH 75: CPT

## 2020-01-01 PROCEDURE — 86769 SARS-COV-2 COVID-19 ANTIBODY: CPT

## 2020-01-01 PROCEDURE — 74176 CT ABD & PELVIS W/O CONTRAST: CPT | Mod: 26

## 2020-01-01 PROCEDURE — 99284 EMERGENCY DEPT VISIT MOD MDM: CPT

## 2020-01-01 PROCEDURE — 83605 ASSAY OF LACTIC ACID: CPT

## 2020-01-01 PROCEDURE — 93010 ELECTROCARDIOGRAM REPORT: CPT

## 2020-01-01 PROCEDURE — U0003: CPT

## 2020-01-01 PROCEDURE — 99233 SBSQ HOSP IP/OBS HIGH 50: CPT

## 2020-01-01 PROCEDURE — 99499A: CUSTOM | Mod: NC

## 2020-01-01 PROCEDURE — 78815 PET IMAGE W/CT SKULL-THIGH: CPT | Mod: 26,PI

## 2020-01-01 PROCEDURE — 36573 INSJ PICC RS&I 5 YR+: CPT

## 2020-01-01 PROCEDURE — 84484 ASSAY OF TROPONIN QUANT: CPT

## 2020-01-01 PROCEDURE — 82962 GLUCOSE BLOOD TEST: CPT

## 2020-01-01 PROCEDURE — 71045 X-RAY EXAM CHEST 1 VIEW: CPT | Mod: 26

## 2020-01-01 PROCEDURE — 86850 RBC ANTIBODY SCREEN: CPT

## 2020-01-01 PROCEDURE — 83735 ASSAY OF MAGNESIUM: CPT

## 2020-01-01 PROCEDURE — 87086 URINE CULTURE/COLONY COUNT: CPT

## 2020-01-01 PROCEDURE — 84100 ASSAY OF PHOSPHORUS: CPT

## 2020-01-01 PROCEDURE — 71250 CT THORAX DX C-: CPT

## 2020-01-01 PROCEDURE — 74018 RADEX ABDOMEN 1 VIEW: CPT | Mod: 26

## 2020-01-01 PROCEDURE — 92611 MOTION FLUOROSCOPY/SWALLOW: CPT

## 2020-01-01 PROCEDURE — 99285 EMERGENCY DEPT VISIT HI MDM: CPT | Mod: 25

## 2020-01-01 PROCEDURE — 92610 EVALUATE SWALLOWING FUNCTION: CPT

## 2020-01-01 PROCEDURE — 99285 EMERGENCY DEPT VISIT HI MDM: CPT

## 2020-01-01 PROCEDURE — 88300 SURGICAL PATH GROSS: CPT

## 2020-01-01 PROCEDURE — 77012 CT SCAN FOR NEEDLE BIOPSY: CPT

## 2020-01-01 PROCEDURE — 77001 FLUOROGUIDE FOR VEIN DEVICE: CPT

## 2020-01-01 PROCEDURE — 71260 CT THORAX DX C+: CPT | Mod: 26

## 2020-01-01 PROCEDURE — 92523 SPEECH SOUND LANG COMPREHEN: CPT

## 2020-01-01 PROCEDURE — 84145 PROCALCITONIN (PCT): CPT

## 2020-01-01 PROCEDURE — 99204 OFFICE O/P NEW MOD 45 MIN: CPT

## 2020-01-01 PROCEDURE — 96367 TX/PROPH/DG ADDL SEQ IV INF: CPT

## 2020-01-01 PROCEDURE — 83690 ASSAY OF LIPASE: CPT

## 2020-01-01 PROCEDURE — 76942 ECHO GUIDE FOR BIOPSY: CPT

## 2020-01-01 PROCEDURE — 77338 DESIGN MLC DEVICE FOR IMRT: CPT | Mod: 26

## 2020-01-01 PROCEDURE — 99223 1ST HOSP IP/OBS HIGH 75: CPT | Mod: AI

## 2020-01-01 PROCEDURE — 43259 EGD US EXAM DUODENUM/JEJUNUM: CPT

## 2020-01-01 PROCEDURE — 81001 URINALYSIS AUTO W/SCOPE: CPT

## 2020-01-01 PROCEDURE — 86901 BLOOD TYPING SEROLOGIC RH(D): CPT

## 2020-01-01 PROCEDURE — G0463: CPT

## 2020-01-01 PROCEDURE — 93971 EXTREMITY STUDY: CPT | Mod: 26,RT

## 2020-01-01 PROCEDURE — 99443: CPT

## 2020-01-01 PROCEDURE — 83036 HEMOGLOBIN GLYCOSYLATED A1C: CPT

## 2020-01-01 PROCEDURE — 99204 OFFICE O/P NEW MOD 45 MIN: CPT | Mod: 95

## 2020-01-01 PROCEDURE — 96368 THER/DIAG CONCURRENT INF: CPT

## 2020-01-01 PROCEDURE — 88342 IMHCHEM/IMCYTCHM 1ST ANTB: CPT

## 2020-01-01 PROCEDURE — 97163 PT EVAL HIGH COMPLEX 45 MIN: CPT

## 2020-01-01 PROCEDURE — G0378: CPT

## 2020-01-01 PROCEDURE — 74177 CT ABD & PELVIS W/CONTRAST: CPT | Mod: 26

## 2020-01-01 PROCEDURE — 86923 COMPATIBILITY TEST ELECTRIC: CPT

## 2020-01-01 PROCEDURE — G9005: CPT

## 2020-01-01 PROCEDURE — 77301 RADIOTHERAPY DOSE PLAN IMRT: CPT | Mod: 26

## 2020-01-01 PROCEDURE — 88305 TISSUE EXAM BY PATHOLOGIST: CPT

## 2020-01-01 PROCEDURE — P9016: CPT

## 2020-01-01 PROCEDURE — 83880 ASSAY OF NATRIURETIC PEPTIDE: CPT

## 2020-01-01 PROCEDURE — 43247 EGD REMOVE FOREIGN BODY: CPT | Mod: 59

## 2020-01-01 PROCEDURE — 96375 TX/PRO/DX INJ NEW DRUG ADDON: CPT

## 2020-01-01 PROCEDURE — 74176 CT ABD & PELVIS W/O CONTRAST: CPT

## 2020-01-01 PROCEDURE — 99291 CRITICAL CARE FIRST HOUR: CPT

## 2020-01-01 PROCEDURE — 43266 EGD ENDOSCOPIC STENT PLACE: CPT

## 2020-01-01 PROCEDURE — 99205 OFFICE O/P NEW HI 60 MIN: CPT | Mod: 25

## 2020-01-01 PROCEDURE — 77263 THER RADIOLOGY TX PLNG CPLX: CPT

## 2020-01-01 PROCEDURE — 88305 TISSUE EXAM BY PATHOLOGIST: CPT | Mod: 26

## 2020-01-01 PROCEDURE — 88333 PATH CONSLTJ SURG CYTO XM 1: CPT

## 2020-01-01 PROCEDURE — 99222 1ST HOSP IP/OBS MODERATE 55: CPT

## 2020-01-01 PROCEDURE — 74220 X-RAY XM ESOPHAGUS 1CNTRST: CPT

## 2020-01-01 PROCEDURE — 86803 HEPATITIS C AB TEST: CPT

## 2020-01-01 PROCEDURE — 74220 X-RAY XM ESOPHAGUS 1CNTRST: CPT | Mod: 26

## 2020-01-01 PROCEDURE — 76000 FLUOROSCOPY <1 HR PHYS/QHP: CPT

## 2020-01-01 PROCEDURE — 99442: CPT

## 2020-01-01 PROCEDURE — 85025 COMPLETE CBC W/AUTO DIFF WBC: CPT

## 2020-01-01 PROCEDURE — C1751: CPT

## 2020-01-01 PROCEDURE — 80202 ASSAY OF VANCOMYCIN: CPT

## 2020-01-01 PROCEDURE — 74230 X-RAY XM SWLNG FUNCJ C+: CPT | Mod: 26

## 2020-01-01 PROCEDURE — 43237 ENDOSCOPIC US EXAM ESOPH: CPT

## 2020-01-01 PROCEDURE — 87635 SARS-COV-2 COVID-19 AMP PRB: CPT

## 2020-01-01 PROCEDURE — 77300 RADIATION THERAPY DOSE PLAN: CPT | Mod: 26

## 2020-01-01 PROCEDURE — 88333 PATH CONSLTJ SURG CYTO XM 1: CPT | Mod: 26

## 2020-01-01 PROCEDURE — 74018 RADEX ABDOMEN 1 VIEW: CPT

## 2020-01-01 PROCEDURE — 96374 THER/PROPH/DIAG INJ IV PUSH: CPT

## 2020-01-01 PROCEDURE — 82272 OCCULT BLD FECES 1-3 TESTS: CPT

## 2020-01-01 PROCEDURE — C1874: CPT

## 2020-01-01 PROCEDURE — 82550 ASSAY OF CK (CPK): CPT

## 2020-01-01 RX ORDER — MAGNESIUM OXIDE 400 MG ORAL TABLET 241.3 MG
400 TABLET ORAL
Refills: 0 | Status: COMPLETED | OUTPATIENT
Start: 2020-01-01 | End: 2020-01-01

## 2020-01-01 RX ORDER — PANTOPRAZOLE 20 MG/1
20 TABLET, DELAYED RELEASE ORAL
Qty: 60 | Refills: 3 | Status: COMPLETED | COMMUNITY
Start: 2020-01-01 | End: 2020-01-01

## 2020-01-01 RX ORDER — TAMSULOSIN HYDROCHLORIDE 0.4 MG/1
0.4 CAPSULE ORAL AT BEDTIME
Refills: 0 | Status: DISCONTINUED | OUTPATIENT
Start: 2020-01-01 | End: 2020-01-01

## 2020-01-01 RX ORDER — VANCOMYCIN HCL 1 G
1000 VIAL (EA) INTRAVENOUS EVERY 12 HOURS
Refills: 0 | Status: DISCONTINUED | OUTPATIENT
Start: 2020-01-01 | End: 2020-01-01

## 2020-01-01 RX ORDER — SODIUM CHLORIDE 9 MG/ML
1000 INJECTION INTRAMUSCULAR; INTRAVENOUS; SUBCUTANEOUS
Refills: 0 | Status: DISCONTINUED | OUTPATIENT
Start: 2020-01-01 | End: 2020-01-01

## 2020-01-01 RX ORDER — VANCOMYCIN HCL 1 G
1000 VIAL (EA) INTRAVENOUS ONCE
Refills: 0 | Status: COMPLETED | OUTPATIENT
Start: 2020-01-01 | End: 2020-01-01

## 2020-01-01 RX ORDER — PROCHLORPERAZINE MALEATE 5 MG
1 TABLET ORAL
Qty: 0 | Refills: 0 | DISCHARGE
Start: 2020-01-01

## 2020-01-01 RX ORDER — DEXTROSE 50 % IN WATER 50 %
25 SYRINGE (ML) INTRAVENOUS ONCE
Refills: 0 | Status: DISCONTINUED | OUTPATIENT
Start: 2020-01-01 | End: 2020-01-01

## 2020-01-01 RX ORDER — MAGNESIUM SULFATE 500 MG/ML
2 VIAL (ML) INJECTION EVERY 6 HOURS
Refills: 0 | Status: COMPLETED | OUTPATIENT
Start: 2020-01-01 | End: 2020-01-01

## 2020-01-01 RX ORDER — MAGNESIUM OXIDE 400 MG ORAL TABLET 241.3 MG
1 TABLET ORAL
Qty: 0 | Refills: 0 | DISCHARGE

## 2020-01-01 RX ORDER — SODIUM CHLORIDE 9 MG/ML
1000 INJECTION, SOLUTION INTRAVENOUS ONCE
Refills: 0 | Status: COMPLETED | OUTPATIENT
Start: 2020-01-01 | End: 2020-01-01

## 2020-01-01 RX ORDER — SODIUM CHLORIDE 9 MG/ML
1950 INJECTION INTRAMUSCULAR; INTRAVENOUS; SUBCUTANEOUS ONCE
Refills: 0 | Status: COMPLETED | OUTPATIENT
Start: 2020-01-01 | End: 2020-01-01

## 2020-01-01 RX ORDER — LISINOPRIL 2.5 MG/1
0 TABLET ORAL
Qty: 0 | Refills: 0 | DISCHARGE

## 2020-01-01 RX ORDER — ATORVASTATIN CALCIUM 80 MG/1
10 TABLET, FILM COATED ORAL AT BEDTIME
Refills: 0 | Status: DISCONTINUED | OUTPATIENT
Start: 2020-01-01 | End: 2020-01-01

## 2020-01-01 RX ORDER — OXYCODONE HYDROCHLORIDE 5 MG/1
0 TABLET ORAL
Qty: 42 | Refills: 0 | DISCHARGE

## 2020-01-01 RX ORDER — PANTOPRAZOLE 40 MG/1
40 TABLET, DELAYED RELEASE ORAL DAILY
Qty: 1 | Refills: 11 | Status: ACTIVE | COMMUNITY
Start: 2020-01-01 | End: 1900-01-01

## 2020-01-01 RX ORDER — VANCOMYCIN HCL 1 G
500 VIAL (EA) INTRAVENOUS EVERY 12 HOURS
Refills: 0 | Status: DISCONTINUED | OUTPATIENT
Start: 2020-01-01 | End: 2020-01-01

## 2020-01-01 RX ORDER — PANTOPRAZOLE SODIUM 20 MG/1
40 TABLET, DELAYED RELEASE ORAL
Refills: 0 | Status: DISCONTINUED | OUTPATIENT
Start: 2020-01-01 | End: 2020-01-01

## 2020-01-01 RX ORDER — POLYETHYLENE GLYCOL 3350 17 G/17G
17 POWDER, FOR SOLUTION ORAL DAILY
Refills: 0 | Status: DISCONTINUED | OUTPATIENT
Start: 2020-01-01 | End: 2020-01-01

## 2020-01-01 RX ORDER — TRAMADOL HYDROCHLORIDE 50 MG/1
50 TABLET ORAL EVERY 6 HOURS
Refills: 0 | Status: DISCONTINUED | OUTPATIENT
Start: 2020-01-01 | End: 2020-01-01

## 2020-01-01 RX ORDER — GLUCAGON INJECTION, SOLUTION 0.5 MG/.1ML
1 INJECTION, SOLUTION SUBCUTANEOUS ONCE
Refills: 0 | Status: DISCONTINUED | OUTPATIENT
Start: 2020-01-01 | End: 2020-01-01

## 2020-01-01 RX ORDER — LISINOPRIL 2.5 MG/1
2.5 TABLET ORAL DAILY
Refills: 0 | Status: DISCONTINUED | OUTPATIENT
Start: 2020-01-01 | End: 2020-01-01

## 2020-01-01 RX ORDER — PANTOPRAZOLE 20 MG/1
20 TABLET, DELAYED RELEASE ORAL
Refills: 0 | Status: ACTIVE | COMMUNITY

## 2020-01-01 RX ORDER — ALPRAZOLAM 0.25 MG
1 TABLET ORAL
Qty: 0 | Refills: 0 | DISCHARGE
Start: 2020-01-01

## 2020-01-01 RX ORDER — SUCRALFATE 1 G
1 TABLET ORAL
Refills: 0 | Status: DISCONTINUED | OUTPATIENT
Start: 2020-01-01 | End: 2020-01-01

## 2020-01-01 RX ORDER — SODIUM CHLORIDE 9 MG/ML
3000 INJECTION, SOLUTION INTRAVENOUS ONCE
Refills: 0 | Status: COMPLETED | OUTPATIENT
Start: 2020-01-01 | End: 2020-01-01

## 2020-01-01 RX ORDER — ENOXAPARIN SODIUM 100 MG/ML
30 INJECTION SUBCUTANEOUS DAILY
Refills: 0 | Status: DISCONTINUED | OUTPATIENT
Start: 2020-01-01 | End: 2020-01-01

## 2020-01-01 RX ORDER — SUCRALFATE 1 G/1
1 TABLET ORAL
Qty: 60 | Refills: 0 | Status: ACTIVE | COMMUNITY
Start: 2020-01-01 | End: 1900-01-01

## 2020-01-01 RX ORDER — INSULIN LISPRO 100/ML
VIAL (ML) SUBCUTANEOUS
Refills: 0 | Status: DISCONTINUED | OUTPATIENT
Start: 2020-01-01 | End: 2020-01-01

## 2020-01-01 RX ORDER — ENOXAPARIN SODIUM 100 MG/ML
40 INJECTION SUBCUTANEOUS DAILY
Refills: 0 | Status: DISCONTINUED | OUTPATIENT
Start: 2020-01-01 | End: 2020-01-01

## 2020-01-01 RX ORDER — SUCRALFATE 1 G
10 TABLET ORAL
Qty: 1200 | Refills: 0
Start: 2020-01-01 | End: 2020-01-01

## 2020-01-01 RX ORDER — OXYCODONE HYDROCHLORIDE 5 MG/1
10 TABLET ORAL EVERY 6 HOURS
Refills: 0 | Status: DISCONTINUED | OUTPATIENT
Start: 2020-01-01 | End: 2020-01-01

## 2020-01-01 RX ORDER — OXYCODONE 10 MG/1
10 TABLET ORAL
Qty: 90 | Refills: 0 | Status: ACTIVE | COMMUNITY
Start: 2020-01-01 | End: 1900-01-01

## 2020-01-01 RX ORDER — SODIUM CHLORIDE 9 MG/ML
2000 INJECTION INTRAMUSCULAR; INTRAVENOUS; SUBCUTANEOUS ONCE
Refills: 0 | Status: COMPLETED | OUTPATIENT
Start: 2020-01-01 | End: 2020-01-01

## 2020-01-01 RX ORDER — ACETAMINOPHEN 500 MG
650 TABLET ORAL EVERY 6 HOURS
Refills: 0 | Status: DISCONTINUED | OUTPATIENT
Start: 2020-01-01 | End: 2020-01-01

## 2020-01-01 RX ORDER — ONDANSETRON 8 MG/1
4 TABLET, FILM COATED ORAL EVERY 4 HOURS
Refills: 0 | Status: DISCONTINUED | OUTPATIENT
Start: 2020-01-01 | End: 2020-01-01

## 2020-01-01 RX ORDER — TAMSULOSIN HYDROCHLORIDE 0.4 MG/1
1 CAPSULE ORAL
Qty: 0 | Refills: 0 | DISCHARGE
Start: 2020-01-01

## 2020-01-01 RX ORDER — SODIUM CHLORIDE 9 MG/ML
1000 INJECTION, SOLUTION INTRAVENOUS ONCE
Refills: 0 | Status: DISCONTINUED | OUTPATIENT
Start: 2020-01-01 | End: 2020-01-01

## 2020-01-01 RX ORDER — OXYCODONE AND ACETAMINOPHEN 5; 325 MG/1; MG/1
1 TABLET ORAL EVERY 4 HOURS
Refills: 0 | Status: DISCONTINUED | OUTPATIENT
Start: 2020-01-01 | End: 2020-01-01

## 2020-01-01 RX ORDER — OXYCODONE 5 MG/1
5 TABLET ORAL
Qty: 42 | Refills: 0 | Status: ACTIVE | COMMUNITY
Start: 2020-01-01 | End: 1900-01-01

## 2020-01-01 RX ORDER — SODIUM CHLORIDE 9 MG/ML
1000 INJECTION INTRAMUSCULAR; INTRAVENOUS; SUBCUTANEOUS ONCE
Refills: 0 | Status: COMPLETED | OUTPATIENT
Start: 2020-01-01 | End: 2020-01-01

## 2020-01-01 RX ORDER — LACTULOSE 10 G/15ML
20 SOLUTION ORAL DAILY
Refills: 0 | Status: DISCONTINUED | OUTPATIENT
Start: 2020-01-01 | End: 2020-01-01

## 2020-01-01 RX ORDER — DEXTROSE 50 % IN WATER 50 %
15 SYRINGE (ML) INTRAVENOUS ONCE
Refills: 0 | Status: DISCONTINUED | OUTPATIENT
Start: 2020-01-01 | End: 2020-01-01

## 2020-01-01 RX ORDER — PIPERACILLIN AND TAZOBACTAM 4; .5 G/20ML; G/20ML
3.38 INJECTION, POWDER, LYOPHILIZED, FOR SOLUTION INTRAVENOUS EVERY 8 HOURS
Refills: 0 | Status: DISCONTINUED | OUTPATIENT
Start: 2020-01-01 | End: 2020-01-01

## 2020-01-01 RX ORDER — OXYCODONE HYDROCHLORIDE 5 MG/1
1 TABLET ORAL
Qty: 0 | Refills: 0 | DISCHARGE

## 2020-01-01 RX ORDER — DEXTROSE 50 % IN WATER 50 %
12.5 SYRINGE (ML) INTRAVENOUS ONCE
Refills: 0 | Status: DISCONTINUED | OUTPATIENT
Start: 2020-01-01 | End: 2020-01-01

## 2020-01-01 RX ORDER — PIPERACILLIN AND TAZOBACTAM 4; .5 G/20ML; G/20ML
3.38 INJECTION, POWDER, LYOPHILIZED, FOR SOLUTION INTRAVENOUS ONCE
Refills: 0 | Status: COMPLETED | OUTPATIENT
Start: 2020-01-01 | End: 2020-01-01

## 2020-01-01 RX ORDER — ONDANSETRON 8 MG/1
1 TABLET, FILM COATED ORAL
Qty: 0 | Refills: 0 | DISCHARGE

## 2020-01-01 RX ORDER — CEFEPIME 1 G/1
2000 INJECTION, POWDER, FOR SOLUTION INTRAMUSCULAR; INTRAVENOUS ONCE
Refills: 0 | Status: COMPLETED | OUTPATIENT
Start: 2020-01-01 | End: 2020-01-01

## 2020-01-01 RX ORDER — SENNA PLUS 8.6 MG/1
2 TABLET ORAL
Qty: 0 | Refills: 0 | DISCHARGE
Start: 2020-01-01

## 2020-01-01 RX ORDER — OXYCODONE HYDROCHLORIDE 5 MG/1
1 TABLET ORAL
Qty: 0 | Refills: 0 | DISCHARGE
Start: 2020-01-01

## 2020-01-01 RX ORDER — CEFEPIME 1 G/1
1000 INJECTION, POWDER, FOR SOLUTION INTRAMUSCULAR; INTRAVENOUS EVERY 12 HOURS
Refills: 0 | Status: DISCONTINUED | OUTPATIENT
Start: 2020-01-01 | End: 2020-01-01

## 2020-01-01 RX ORDER — SUCRALFATE 1 G/10ML
1 SUSPENSION ORAL
Qty: 600 | Refills: 0 | Status: DISCONTINUED | COMMUNITY
Start: 2020-01-01 | End: 2020-01-01

## 2020-01-01 RX ORDER — AZITHROMYCIN 500 MG/1
500 TABLET, FILM COATED ORAL ONCE
Refills: 0 | Status: COMPLETED | OUTPATIENT
Start: 2020-01-01 | End: 2020-01-01

## 2020-01-01 RX ORDER — SENNA PLUS 8.6 MG/1
2 TABLET ORAL AT BEDTIME
Refills: 0 | Status: DISCONTINUED | OUTPATIENT
Start: 2020-01-01 | End: 2020-01-01

## 2020-01-01 RX ORDER — METOPROLOL TARTRATE 50 MG
1 TABLET ORAL
Qty: 0 | Refills: 0 | DISCHARGE

## 2020-01-01 RX ORDER — ONDANSETRON 8 MG/1
8 TABLET, ORALLY DISINTEGRATING ORAL EVERY 8 HOURS
Qty: 90 | Refills: 1 | Status: ACTIVE | COMMUNITY
Start: 2020-01-01 | End: 1900-01-01

## 2020-01-01 RX ORDER — FENTANYL CITRATE 50 UG/ML
1 INJECTION INTRAVENOUS
Refills: 0 | Status: ACTIVE | OUTPATIENT
Start: 2020-01-01 | End: 2020-12-07

## 2020-01-01 RX ORDER — LIDOCAINE 4 G/100G
0 CREAM TOPICAL
Qty: 0 | Refills: 0 | DISCHARGE
Start: 2020-01-01

## 2020-01-01 RX ORDER — ACETAMINOPHEN 500 MG
650 TABLET ORAL ONCE
Refills: 0 | Status: COMPLETED | OUTPATIENT
Start: 2020-01-01 | End: 2020-01-01

## 2020-01-01 RX ORDER — CEFEPIME 1 G/1
1000 INJECTION, POWDER, FOR SOLUTION INTRAMUSCULAR; INTRAVENOUS ONCE
Refills: 0 | Status: COMPLETED | OUTPATIENT
Start: 2020-01-01 | End: 2020-01-01

## 2020-01-01 RX ORDER — OXYCODONE AND ACETAMINOPHEN 5; 325 MG/1; MG/1
2 TABLET ORAL EVERY 6 HOURS
Refills: 0 | Status: DISCONTINUED | OUTPATIENT
Start: 2020-01-01 | End: 2020-01-01

## 2020-01-01 RX ORDER — SODIUM CHLORIDE 9 MG/ML
10 INJECTION INTRAMUSCULAR; INTRAVENOUS; SUBCUTANEOUS
Refills: 0 | Status: DISCONTINUED | OUTPATIENT
Start: 2020-01-01 | End: 2020-01-01

## 2020-01-01 RX ORDER — PROCHLORPERAZINE MALEATE 5 MG
1 TABLET ORAL
Qty: 0 | Refills: 0 | DISCHARGE

## 2020-01-01 RX ORDER — LACTULOSE 10 G/15ML
30 SOLUTION ORAL
Qty: 0 | Refills: 0 | DISCHARGE

## 2020-01-01 RX ORDER — PIPERACILLIN AND TAZOBACTAM 4; .5 G/20ML; G/20ML
3.38 INJECTION, POWDER, LYOPHILIZED, FOR SOLUTION INTRAVENOUS EVERY 8 HOURS
Refills: 0 | Status: COMPLETED | OUTPATIENT
Start: 2020-01-01 | End: 2020-01-01

## 2020-01-01 RX ORDER — ATORVASTATIN CALCIUM 10 MG/1
10 TABLET, FILM COATED ORAL
Refills: 0 | Status: COMPLETED | COMMUNITY
End: 2020-01-01

## 2020-01-01 RX ORDER — CEFEPIME 1 G/1
1000 INJECTION, POWDER, FOR SOLUTION INTRAMUSCULAR; INTRAVENOUS DAILY
Refills: 0 | Status: DISCONTINUED | OUTPATIENT
Start: 2020-01-01 | End: 2020-01-01

## 2020-01-01 RX ORDER — METOPROLOL TARTRATE 50 MG
50 TABLET ORAL DAILY
Refills: 0 | Status: DISCONTINUED | OUTPATIENT
Start: 2020-01-01 | End: 2020-01-01

## 2020-01-01 RX ORDER — INSULIN LISPRO 100/ML
VIAL (ML) SUBCUTANEOUS EVERY 6 HOURS
Refills: 0 | Status: DISCONTINUED | OUTPATIENT
Start: 2020-01-01 | End: 2020-01-01

## 2020-01-01 RX ORDER — VANCOMYCIN HCL 1 G
1000 VIAL (EA) INTRAVENOUS EVERY 24 HOURS
Refills: 0 | Status: DISCONTINUED | OUTPATIENT
Start: 2020-01-01 | End: 2020-01-01

## 2020-01-01 RX ORDER — FENTANYL CITRATE 50 UG/ML
1 INJECTION INTRAVENOUS
Qty: 0 | Refills: 0 | DISCHARGE

## 2020-01-01 RX ORDER — SODIUM CHLORIDE 9 MG/ML
1000 INJECTION, SOLUTION INTRAVENOUS
Refills: 0 | Status: DISCONTINUED | OUTPATIENT
Start: 2020-01-01 | End: 2020-01-01

## 2020-01-01 RX ORDER — CHLORHEXIDINE GLUCONATE 213 G/1000ML
1 SOLUTION TOPICAL DAILY
Refills: 0 | Status: DISCONTINUED | OUTPATIENT
Start: 2020-01-01 | End: 2020-01-01

## 2020-01-01 RX ORDER — TRAMADOL HYDROCHLORIDE 50 MG/1
50 TABLET, COATED ORAL
Qty: 90 | Refills: 0 | Status: ACTIVE | COMMUNITY
Start: 2020-01-01 | End: 1900-01-01

## 2020-01-01 RX ORDER — BENZONATATE 200 MG/1
200 CAPSULE ORAL
Refills: 0 | Status: COMPLETED | COMMUNITY
End: 2020-01-01

## 2020-01-01 RX ORDER — MORPHINE SULFATE 50 MG/1
4 CAPSULE, EXTENDED RELEASE ORAL ONCE
Refills: 0 | Status: DISCONTINUED | OUTPATIENT
Start: 2020-01-01 | End: 2020-01-01

## 2020-01-01 RX ORDER — ALPRAZOLAM 0.25 MG
0.25 TABLET ORAL EVERY 8 HOURS
Refills: 0 | Status: DISCONTINUED | OUTPATIENT
Start: 2020-01-01 | End: 2020-01-01

## 2020-01-01 RX ORDER — ACETAMINOPHEN 500 MG
975 TABLET ORAL ONCE
Refills: 0 | Status: COMPLETED | OUTPATIENT
Start: 2020-01-01 | End: 2020-01-01

## 2020-01-01 RX ORDER — MAGNESIUM SULFATE 500 MG/ML
2 VIAL (ML) INJECTION ONCE
Refills: 0 | Status: COMPLETED | OUTPATIENT
Start: 2020-01-01 | End: 2020-01-01

## 2020-01-01 RX ORDER — ONDANSETRON 8 MG/1
8 TABLET, FILM COATED ORAL EVERY 8 HOURS
Refills: 0 | Status: DISCONTINUED | OUTPATIENT
Start: 2020-01-01 | End: 2020-01-01

## 2020-01-01 RX ORDER — LACTULOSE 10 G/15ML
10 SOLUTION ORAL
Qty: 900 | Refills: 2 | Status: COMPLETED | COMMUNITY
Start: 2020-01-01 | End: 2020-01-01

## 2020-01-01 RX ORDER — MIDODRINE HYDROCHLORIDE 2.5 MG/1
2.5 TABLET ORAL THREE TIMES A DAY
Refills: 0 | Status: DISCONTINUED | OUTPATIENT
Start: 2020-01-01 | End: 2020-01-01

## 2020-01-01 RX ORDER — MAGNESIUM SULFATE 500 MG/ML
2 VIAL (ML) INJECTION ONCE
Refills: 0 | Status: DISCONTINUED | OUTPATIENT
Start: 2020-01-01 | End: 2020-01-01

## 2020-01-01 RX ORDER — CEFEPIME 1 G/1
INJECTION, POWDER, FOR SOLUTION INTRAMUSCULAR; INTRAVENOUS
Refills: 0 | Status: DISCONTINUED | OUTPATIENT
Start: 2020-01-01 | End: 2020-01-01

## 2020-01-01 RX ORDER — PANTOPRAZOLE SODIUM 20 MG/1
1 TABLET, DELAYED RELEASE ORAL
Qty: 0 | Refills: 0 | DISCHARGE
Start: 2020-01-01

## 2020-01-01 RX ORDER — CEFEPIME 1 G/1
2000 INJECTION, POWDER, FOR SOLUTION INTRAMUSCULAR; INTRAVENOUS EVERY 12 HOURS
Refills: 0 | Status: DISCONTINUED | OUTPATIENT
Start: 2020-01-01 | End: 2020-01-01

## 2020-01-01 RX ORDER — SITAGLIPTIN 50 MG/1
1 TABLET, FILM COATED ORAL
Qty: 0 | Refills: 0 | DISCHARGE

## 2020-01-01 RX ORDER — METFORMIN HYDROCHLORIDE 850 MG/1
0 TABLET ORAL
Qty: 0 | Refills: 0 | DISCHARGE

## 2020-01-01 RX ORDER — LIDOCAINE 4 G/100G
1 CREAM TOPICAL DAILY
Refills: 0 | Status: DISCONTINUED | OUTPATIENT
Start: 2020-01-01 | End: 2020-01-01

## 2020-01-01 RX ORDER — INSULIN LISPRO 100/ML
VIAL (ML) SUBCUTANEOUS AT BEDTIME
Refills: 0 | Status: DISCONTINUED | OUTPATIENT
Start: 2020-01-01 | End: 2020-01-01

## 2020-01-01 RX ORDER — PROCHLORPERAZINE MALEATE 5 MG
10 TABLET ORAL EVERY 6 HOURS
Refills: 0 | Status: DISCONTINUED | OUTPATIENT
Start: 2020-01-01 | End: 2020-01-01

## 2020-01-01 RX ORDER — PANTOPRAZOLE SODIUM 20 MG/1
40 TABLET, DELAYED RELEASE ORAL EVERY 12 HOURS
Refills: 0 | Status: DISCONTINUED | OUTPATIENT
Start: 2020-01-01 | End: 2020-01-01

## 2020-01-01 RX ORDER — MAGNESIUM OXIDE 400 MG ORAL TABLET 241.3 MG
400 TABLET ORAL
Refills: 0 | Status: DISCONTINUED | OUTPATIENT
Start: 2020-01-01 | End: 2020-01-01

## 2020-01-01 RX ORDER — CHLORHEXIDINE GLUCONATE 213 G/1000ML
1 SOLUTION TOPICAL
Refills: 0 | Status: DISCONTINUED | OUTPATIENT
Start: 2020-01-01 | End: 2020-01-01

## 2020-01-01 RX ORDER — AMPICILLIN SODIUM AND SULBACTAM SODIUM 250; 125 MG/ML; MG/ML
3 INJECTION, POWDER, FOR SUSPENSION INTRAMUSCULAR; INTRAVENOUS ONCE
Refills: 0 | Status: COMPLETED | OUTPATIENT
Start: 2020-01-01 | End: 2020-01-01

## 2020-01-01 RX ORDER — MAGNESIUM OXIDE 241.3 MG/1000MG
400 TABLET ORAL
Qty: 42 | Refills: 3 | Status: ACTIVE | COMMUNITY
Start: 2020-01-01 | End: 1900-01-01

## 2020-01-01 RX ORDER — ONDANSETRON 8 MG/1
4 TABLET, FILM COATED ORAL EVERY 6 HOURS
Refills: 0 | Status: DISCONTINUED | OUTPATIENT
Start: 2020-01-01 | End: 2020-01-01

## 2020-01-01 RX ORDER — FENTANYL CITRATE 50 UG/ML
1 INJECTION INTRAVENOUS
Qty: 0 | Refills: 0 | DISCHARGE
Start: 2020-01-01

## 2020-01-01 RX ORDER — POLYETHYLENE GLYCOL 3350 17 G/17G
17 POWDER, FOR SOLUTION ORAL ONCE
Refills: 0 | Status: COMPLETED | OUTPATIENT
Start: 2020-01-01 | End: 2020-01-01

## 2020-01-01 RX ORDER — MAGNESIUM SULFATE 500 MG/ML
1 VIAL (ML) INJECTION ONCE
Refills: 0 | Status: COMPLETED | OUTPATIENT
Start: 2020-01-01 | End: 2020-01-01

## 2020-01-01 RX ORDER — FENTANYL CITRATE 50 UG/ML
1 INJECTION INTRAVENOUS
Refills: 0 | Status: DISCONTINUED | OUTPATIENT
Start: 2020-01-01 | End: 2020-01-01

## 2020-01-01 RX ORDER — SUCRALFATE 1 G
1 TABLET ORAL
Qty: 0 | Refills: 0 | DISCHARGE

## 2020-01-01 RX ORDER — ONDANSETRON 4 MG/1
4 TABLET, ORALLY DISINTEGRATING ORAL
Refills: 0 | Status: COMPLETED | COMMUNITY
End: 2020-01-01

## 2020-01-01 RX ORDER — ACETAMINOPHEN 500 MG
1000 TABLET ORAL ONCE
Refills: 0 | Status: COMPLETED | OUTPATIENT
Start: 2020-01-01 | End: 2020-01-01

## 2020-01-01 RX ORDER — PROCHLORPERAZINE MALEATE 10 MG/1
10 TABLET ORAL EVERY 6 HOURS
Qty: 120 | Refills: 0 | Status: ACTIVE | COMMUNITY
Start: 2020-01-01 | End: 1900-01-01

## 2020-01-01 RX ORDER — POLYETHYLENE GLYCOL 3350 17 G/17G
17 POWDER, FOR SOLUTION ORAL
Qty: 0 | Refills: 0 | DISCHARGE
Start: 2020-01-01

## 2020-01-01 RX ADMIN — Medication 2: at 08:33

## 2020-01-01 RX ADMIN — Medication 1: at 12:33

## 2020-01-01 RX ADMIN — PIPERACILLIN AND TAZOBACTAM 25 GRAM(S): 4; .5 INJECTION, POWDER, LYOPHILIZED, FOR SOLUTION INTRAVENOUS at 01:58

## 2020-01-01 RX ADMIN — Medication 2: at 16:36

## 2020-01-01 RX ADMIN — ATORVASTATIN CALCIUM 10 MILLIGRAM(S): 80 TABLET, FILM COATED ORAL at 21:57

## 2020-01-01 RX ADMIN — SODIUM CHLORIDE 100 MILLILITER(S): 9 INJECTION, SOLUTION INTRAVENOUS at 20:18

## 2020-01-01 RX ADMIN — Medication 0.25 MILLIGRAM(S): at 22:17

## 2020-01-01 RX ADMIN — AMPICILLIN SODIUM AND SULBACTAM SODIUM 3 GRAM(S): 250; 125 INJECTION, POWDER, FOR SUSPENSION INTRAMUSCULAR; INTRAVENOUS at 12:00

## 2020-01-01 RX ADMIN — OXYCODONE HYDROCHLORIDE 10 MILLIGRAM(S): 5 TABLET ORAL at 23:25

## 2020-01-01 RX ADMIN — Medication 50 MILLIGRAM(S): at 05:04

## 2020-01-01 RX ADMIN — SODIUM CHLORIDE 3000 MILLILITER(S): 9 INJECTION, SOLUTION INTRAVENOUS at 10:32

## 2020-01-01 RX ADMIN — PIPERACILLIN AND TAZOBACTAM 25 GRAM(S): 4; .5 INJECTION, POWDER, LYOPHILIZED, FOR SOLUTION INTRAVENOUS at 06:27

## 2020-01-01 RX ADMIN — PANTOPRAZOLE SODIUM 40 MILLIGRAM(S): 20 TABLET, DELAYED RELEASE ORAL at 18:31

## 2020-01-01 RX ADMIN — ATORVASTATIN CALCIUM 10 MILLIGRAM(S): 80 TABLET, FILM COATED ORAL at 21:05

## 2020-01-01 RX ADMIN — PIPERACILLIN AND TAZOBACTAM 25 GRAM(S): 4; .5 INJECTION, POWDER, LYOPHILIZED, FOR SOLUTION INTRAVENOUS at 05:16

## 2020-01-01 RX ADMIN — ENOXAPARIN SODIUM 30 MILLIGRAM(S): 100 INJECTION SUBCUTANEOUS at 13:15

## 2020-01-01 RX ADMIN — CEFEPIME 2000 MILLIGRAM(S): 1 INJECTION, POWDER, FOR SOLUTION INTRAMUSCULAR; INTRAVENOUS at 11:00

## 2020-01-01 RX ADMIN — PIPERACILLIN AND TAZOBACTAM 25 GRAM(S): 4; .5 INJECTION, POWDER, LYOPHILIZED, FOR SOLUTION INTRAVENOUS at 21:36

## 2020-01-01 RX ADMIN — OXYCODONE HYDROCHLORIDE 10 MILLIGRAM(S): 5 TABLET ORAL at 22:21

## 2020-01-01 RX ADMIN — MIDODRINE HYDROCHLORIDE 2.5 MILLIGRAM(S): 2.5 TABLET ORAL at 18:30

## 2020-01-01 RX ADMIN — SODIUM CHLORIDE 150 MILLILITER(S): 9 INJECTION INTRAMUSCULAR; INTRAVENOUS; SUBCUTANEOUS at 13:21

## 2020-01-01 RX ADMIN — ATORVASTATIN CALCIUM 10 MILLIGRAM(S): 80 TABLET, FILM COATED ORAL at 22:04

## 2020-01-01 RX ADMIN — CEFEPIME 2000 MILLIGRAM(S): 1 INJECTION, POWDER, FOR SOLUTION INTRAMUSCULAR; INTRAVENOUS at 10:02

## 2020-01-01 RX ADMIN — Medication 50 GRAM(S): at 11:56

## 2020-01-01 RX ADMIN — OXYCODONE HYDROCHLORIDE 10 MILLIGRAM(S): 5 TABLET ORAL at 00:23

## 2020-01-01 RX ADMIN — ENOXAPARIN SODIUM 30 MILLIGRAM(S): 100 INJECTION SUBCUTANEOUS at 08:09

## 2020-01-01 RX ADMIN — Medication 0.5 MILLIGRAM(S): at 04:03

## 2020-01-01 RX ADMIN — Medication 50 MILLIGRAM(S): at 06:00

## 2020-01-01 RX ADMIN — Medication 50 GRAM(S): at 10:45

## 2020-01-01 RX ADMIN — CEFEPIME 100 MILLIGRAM(S): 1 INJECTION, POWDER, FOR SOLUTION INTRAMUSCULAR; INTRAVENOUS at 05:30

## 2020-01-01 RX ADMIN — Medication 250 MILLIGRAM(S): at 10:02

## 2020-01-01 RX ADMIN — OXYCODONE HYDROCHLORIDE 10 MILLIGRAM(S): 5 TABLET ORAL at 23:20

## 2020-01-01 RX ADMIN — ENOXAPARIN SODIUM 40 MILLIGRAM(S): 100 INJECTION SUBCUTANEOUS at 11:14

## 2020-01-01 RX ADMIN — ENOXAPARIN SODIUM 30 MILLIGRAM(S): 100 INJECTION SUBCUTANEOUS at 12:21

## 2020-01-01 RX ADMIN — PIPERACILLIN AND TAZOBACTAM 25 GRAM(S): 4; .5 INJECTION, POWDER, LYOPHILIZED, FOR SOLUTION INTRAVENOUS at 05:58

## 2020-01-01 RX ADMIN — OXYCODONE HYDROCHLORIDE 10 MILLIGRAM(S): 5 TABLET ORAL at 14:19

## 2020-01-01 RX ADMIN — PIPERACILLIN AND TAZOBACTAM 25 GRAM(S): 4; .5 INJECTION, POWDER, LYOPHILIZED, FOR SOLUTION INTRAVENOUS at 21:57

## 2020-01-01 RX ADMIN — Medication 1: at 18:37

## 2020-01-01 RX ADMIN — Medication 50 MILLIGRAM(S): at 05:35

## 2020-01-01 RX ADMIN — CHLORHEXIDINE GLUCONATE 1 APPLICATION(S): 213 SOLUTION TOPICAL at 12:21

## 2020-01-01 RX ADMIN — Medication 2: at 12:28

## 2020-01-01 RX ADMIN — Medication 0.25 MILLIGRAM(S): at 09:48

## 2020-01-01 RX ADMIN — OXYCODONE HYDROCHLORIDE 10 MILLIGRAM(S): 5 TABLET ORAL at 18:29

## 2020-01-01 RX ADMIN — SODIUM CHLORIDE 500 MILLILITER(S): 9 INJECTION INTRAMUSCULAR; INTRAVENOUS; SUBCUTANEOUS at 10:40

## 2020-01-01 RX ADMIN — ENOXAPARIN SODIUM 40 MILLIGRAM(S): 100 INJECTION SUBCUTANEOUS at 11:23

## 2020-01-01 RX ADMIN — OXYCODONE HYDROCHLORIDE 10 MILLIGRAM(S): 5 TABLET ORAL at 10:45

## 2020-01-01 RX ADMIN — ENOXAPARIN SODIUM 30 MILLIGRAM(S): 100 INJECTION SUBCUTANEOUS at 12:17

## 2020-01-01 RX ADMIN — Medication 1: at 21:31

## 2020-01-01 RX ADMIN — SODIUM CHLORIDE 3000 MILLILITER(S): 9 INJECTION, SOLUTION INTRAVENOUS at 12:12

## 2020-01-01 RX ADMIN — PANTOPRAZOLE SODIUM 40 MILLIGRAM(S): 20 TABLET, DELAYED RELEASE ORAL at 05:35

## 2020-01-01 RX ADMIN — OXYCODONE HYDROCHLORIDE 10 MILLIGRAM(S): 5 TABLET ORAL at 12:45

## 2020-01-01 RX ADMIN — Medication 1 GRAM(S): at 05:16

## 2020-01-01 RX ADMIN — PIPERACILLIN AND TAZOBACTAM 25 GRAM(S): 4; .5 INJECTION, POWDER, LYOPHILIZED, FOR SOLUTION INTRAVENOUS at 05:35

## 2020-01-01 RX ADMIN — ENOXAPARIN SODIUM 40 MILLIGRAM(S): 100 INJECTION SUBCUTANEOUS at 12:28

## 2020-01-01 RX ADMIN — PIPERACILLIN AND TAZOBACTAM 25 GRAM(S): 4; .5 INJECTION, POWDER, LYOPHILIZED, FOR SOLUTION INTRAVENOUS at 08:11

## 2020-01-01 RX ADMIN — OXYCODONE HYDROCHLORIDE 10 MILLIGRAM(S): 5 TABLET ORAL at 01:00

## 2020-01-01 RX ADMIN — Medication 50 MILLIGRAM(S): at 05:11

## 2020-01-01 RX ADMIN — Medication 1: at 12:17

## 2020-01-01 RX ADMIN — SODIUM CHLORIDE 1000 MILLILITER(S): 9 INJECTION INTRAMUSCULAR; INTRAVENOUS; SUBCUTANEOUS at 15:57

## 2020-01-01 RX ADMIN — ATORVASTATIN CALCIUM 10 MILLIGRAM(S): 80 TABLET, FILM COATED ORAL at 22:21

## 2020-01-01 RX ADMIN — OXYCODONE HYDROCHLORIDE 10 MILLIGRAM(S): 5 TABLET ORAL at 20:00

## 2020-01-01 RX ADMIN — MAGNESIUM OXIDE 400 MG ORAL TABLET 400 MILLIGRAM(S): 241.3 TABLET ORAL at 08:02

## 2020-01-01 RX ADMIN — PANTOPRAZOLE SODIUM 40 MILLIGRAM(S): 20 TABLET, DELAYED RELEASE ORAL at 05:14

## 2020-01-01 RX ADMIN — Medication 2: at 12:31

## 2020-01-01 RX ADMIN — Medication 1 GRAM(S): at 18:26

## 2020-01-01 RX ADMIN — Medication 1000 MILLIGRAM(S): at 15:57

## 2020-01-01 RX ADMIN — PANTOPRAZOLE SODIUM 40 MILLIGRAM(S): 20 TABLET, DELAYED RELEASE ORAL at 05:42

## 2020-01-01 RX ADMIN — ATORVASTATIN CALCIUM 10 MILLIGRAM(S): 80 TABLET, FILM COATED ORAL at 22:59

## 2020-01-01 RX ADMIN — Medication 1 GRAM(S): at 18:37

## 2020-01-01 RX ADMIN — CEFEPIME 100 MILLIGRAM(S): 1 INJECTION, POWDER, FOR SOLUTION INTRAMUSCULAR; INTRAVENOUS at 15:57

## 2020-01-01 RX ADMIN — ATORVASTATIN CALCIUM 10 MILLIGRAM(S): 80 TABLET, FILM COATED ORAL at 21:48

## 2020-01-01 RX ADMIN — ENOXAPARIN SODIUM 30 MILLIGRAM(S): 100 INJECTION SUBCUTANEOUS at 12:06

## 2020-01-01 RX ADMIN — ENOXAPARIN SODIUM 40 MILLIGRAM(S): 100 INJECTION SUBCUTANEOUS at 11:54

## 2020-01-01 RX ADMIN — Medication 1: at 08:14

## 2020-01-01 RX ADMIN — PANTOPRAZOLE SODIUM 40 MILLIGRAM(S): 20 TABLET, DELAYED RELEASE ORAL at 06:44

## 2020-01-01 RX ADMIN — PANTOPRAZOLE SODIUM 40 MILLIGRAM(S): 20 TABLET, DELAYED RELEASE ORAL at 05:04

## 2020-01-01 RX ADMIN — ATORVASTATIN CALCIUM 10 MILLIGRAM(S): 80 TABLET, FILM COATED ORAL at 21:55

## 2020-01-01 RX ADMIN — CHLORHEXIDINE GLUCONATE 1 APPLICATION(S): 213 SOLUTION TOPICAL at 14:13

## 2020-01-01 RX ADMIN — Medication 250 MILLIGRAM(S): at 10:31

## 2020-01-01 RX ADMIN — OXYCODONE HYDROCHLORIDE 10 MILLIGRAM(S): 5 TABLET ORAL at 21:45

## 2020-01-01 RX ADMIN — ENOXAPARIN SODIUM 30 MILLIGRAM(S): 100 INJECTION SUBCUTANEOUS at 12:33

## 2020-01-01 RX ADMIN — POLYETHYLENE GLYCOL 3350 17 GRAM(S): 17 POWDER, FOR SOLUTION ORAL at 14:57

## 2020-01-01 RX ADMIN — PIPERACILLIN AND TAZOBACTAM 25 GRAM(S): 4; .5 INJECTION, POWDER, LYOPHILIZED, FOR SOLUTION INTRAVENOUS at 00:45

## 2020-01-01 RX ADMIN — Medication 1000 MILLIGRAM(S): at 11:41

## 2020-01-01 RX ADMIN — FENTANYL CITRATE 1 PATCH: 50 INJECTION INTRAVENOUS at 13:07

## 2020-01-01 RX ADMIN — Medication 975 MILLIGRAM(S): at 10:30

## 2020-01-01 RX ADMIN — PANTOPRAZOLE SODIUM 40 MILLIGRAM(S): 20 TABLET, DELAYED RELEASE ORAL at 06:19

## 2020-01-01 RX ADMIN — SODIUM CHLORIDE 125 MILLILITER(S): 9 INJECTION, SOLUTION INTRAVENOUS at 18:33

## 2020-01-01 RX ADMIN — PIPERACILLIN AND TAZOBACTAM 25 GRAM(S): 4; .5 INJECTION, POWDER, LYOPHILIZED, FOR SOLUTION INTRAVENOUS at 13:48

## 2020-01-01 RX ADMIN — Medication 650 MILLIGRAM(S): at 09:32

## 2020-01-01 RX ADMIN — PIPERACILLIN AND TAZOBACTAM 25 GRAM(S): 4; .5 INJECTION, POWDER, LYOPHILIZED, FOR SOLUTION INTRAVENOUS at 08:02

## 2020-01-01 RX ADMIN — OXYCODONE HYDROCHLORIDE 10 MILLIGRAM(S): 5 TABLET ORAL at 06:02

## 2020-01-01 RX ADMIN — Medication 1 GRAM(S): at 12:16

## 2020-01-01 RX ADMIN — FENTANYL CITRATE 1 PATCH: 50 INJECTION INTRAVENOUS at 15:01

## 2020-01-01 RX ADMIN — PIPERACILLIN AND TAZOBACTAM 25 GRAM(S): 4; .5 INJECTION, POWDER, LYOPHILIZED, FOR SOLUTION INTRAVENOUS at 05:23

## 2020-01-01 RX ADMIN — OXYCODONE HYDROCHLORIDE 10 MILLIGRAM(S): 5 TABLET ORAL at 12:29

## 2020-01-01 RX ADMIN — Medication 1 GRAM(S): at 06:00

## 2020-01-01 RX ADMIN — OXYCODONE HYDROCHLORIDE 10 MILLIGRAM(S): 5 TABLET ORAL at 22:26

## 2020-01-01 RX ADMIN — SODIUM CHLORIDE 2000 MILLILITER(S): 9 INJECTION INTRAMUSCULAR; INTRAVENOUS; SUBCUTANEOUS at 09:32

## 2020-01-01 RX ADMIN — PANTOPRAZOLE SODIUM 40 MILLIGRAM(S): 20 TABLET, DELAYED RELEASE ORAL at 05:15

## 2020-01-01 RX ADMIN — ENOXAPARIN SODIUM 40 MILLIGRAM(S): 100 INJECTION SUBCUTANEOUS at 11:17

## 2020-01-01 RX ADMIN — SODIUM CHLORIDE 100 MILLILITER(S): 9 INJECTION, SOLUTION INTRAVENOUS at 21:26

## 2020-01-01 RX ADMIN — PIPERACILLIN AND TAZOBACTAM 25 GRAM(S): 4; .5 INJECTION, POWDER, LYOPHILIZED, FOR SOLUTION INTRAVENOUS at 07:45

## 2020-01-01 RX ADMIN — SODIUM CHLORIDE 40 MILLILITER(S): 9 INJECTION INTRAMUSCULAR; INTRAVENOUS; SUBCUTANEOUS at 14:09

## 2020-01-01 RX ADMIN — Medication 1: at 11:31

## 2020-01-01 RX ADMIN — Medication 50 MILLIGRAM(S): at 05:44

## 2020-01-01 RX ADMIN — OXYCODONE HYDROCHLORIDE 10 MILLIGRAM(S): 5 TABLET ORAL at 09:37

## 2020-01-01 RX ADMIN — Medication 50 GRAM(S): at 18:25

## 2020-01-01 RX ADMIN — ATORVASTATIN CALCIUM 10 MILLIGRAM(S): 80 TABLET, FILM COATED ORAL at 21:09

## 2020-01-01 RX ADMIN — LISINOPRIL 2.5 MILLIGRAM(S): 2.5 TABLET ORAL at 13:07

## 2020-01-01 RX ADMIN — CEFEPIME 100 MILLIGRAM(S): 1 INJECTION, POWDER, FOR SOLUTION INTRAMUSCULAR; INTRAVENOUS at 08:30

## 2020-01-01 RX ADMIN — ATORVASTATIN CALCIUM 10 MILLIGRAM(S): 80 TABLET, FILM COATED ORAL at 21:26

## 2020-01-01 RX ADMIN — SODIUM CHLORIDE 100 MILLILITER(S): 9 INJECTION INTRAMUSCULAR; INTRAVENOUS; SUBCUTANEOUS at 13:15

## 2020-01-01 RX ADMIN — PIPERACILLIN AND TAZOBACTAM 25 GRAM(S): 4; .5 INJECTION, POWDER, LYOPHILIZED, FOR SOLUTION INTRAVENOUS at 05:41

## 2020-01-01 RX ADMIN — PANTOPRAZOLE SODIUM 40 MILLIGRAM(S): 20 TABLET, DELAYED RELEASE ORAL at 05:58

## 2020-01-01 RX ADMIN — Medication 2: at 08:35

## 2020-01-01 RX ADMIN — OXYCODONE HYDROCHLORIDE 10 MILLIGRAM(S): 5 TABLET ORAL at 14:22

## 2020-01-01 RX ADMIN — Medication 50 MILLIGRAM(S): at 05:42

## 2020-01-01 RX ADMIN — PANTOPRAZOLE SODIUM 40 MILLIGRAM(S): 20 TABLET, DELAYED RELEASE ORAL at 20:30

## 2020-01-01 RX ADMIN — Medication 250 MILLIGRAM(S): at 06:01

## 2020-01-01 RX ADMIN — PIPERACILLIN AND TAZOBACTAM 200 GRAM(S): 4; .5 INJECTION, POWDER, LYOPHILIZED, FOR SOLUTION INTRAVENOUS at 18:35

## 2020-01-01 RX ADMIN — PANTOPRAZOLE SODIUM 40 MILLIGRAM(S): 20 TABLET, DELAYED RELEASE ORAL at 12:32

## 2020-01-01 RX ADMIN — Medication 50 MILLIGRAM(S): at 05:16

## 2020-01-01 RX ADMIN — Medication 650 MILLIGRAM(S): at 10:57

## 2020-01-01 RX ADMIN — PIPERACILLIN AND TAZOBACTAM 25 GRAM(S): 4; .5 INJECTION, POWDER, LYOPHILIZED, FOR SOLUTION INTRAVENOUS at 12:34

## 2020-01-01 RX ADMIN — Medication 1: at 13:14

## 2020-01-01 RX ADMIN — Medication 250 MILLIGRAM(S): at 14:14

## 2020-01-01 RX ADMIN — PIPERACILLIN AND TAZOBACTAM 25 GRAM(S): 4; .5 INJECTION, POWDER, LYOPHILIZED, FOR SOLUTION INTRAVENOUS at 08:08

## 2020-01-01 RX ADMIN — PIPERACILLIN AND TAZOBACTAM 25 GRAM(S): 4; .5 INJECTION, POWDER, LYOPHILIZED, FOR SOLUTION INTRAVENOUS at 23:04

## 2020-01-01 RX ADMIN — CEFEPIME 100 MILLIGRAM(S): 1 INJECTION, POWDER, FOR SOLUTION INTRAMUSCULAR; INTRAVENOUS at 05:31

## 2020-01-01 RX ADMIN — ATORVASTATIN CALCIUM 10 MILLIGRAM(S): 80 TABLET, FILM COATED ORAL at 21:42

## 2020-01-01 RX ADMIN — Medication 1000 MILLIGRAM(S): at 11:07

## 2020-01-01 RX ADMIN — OXYCODONE HYDROCHLORIDE 10 MILLIGRAM(S): 5 TABLET ORAL at 00:00

## 2020-01-01 RX ADMIN — PIPERACILLIN AND TAZOBACTAM 25 GRAM(S): 4; .5 INJECTION, POWDER, LYOPHILIZED, FOR SOLUTION INTRAVENOUS at 13:24

## 2020-01-01 RX ADMIN — Medication 1: at 07:46

## 2020-01-01 RX ADMIN — ENOXAPARIN SODIUM 40 MILLIGRAM(S): 100 INJECTION SUBCUTANEOUS at 14:13

## 2020-01-01 RX ADMIN — MIDODRINE HYDROCHLORIDE 2.5 MILLIGRAM(S): 2.5 TABLET ORAL at 11:55

## 2020-01-01 RX ADMIN — PIPERACILLIN AND TAZOBACTAM 25 GRAM(S): 4; .5 INJECTION, POWDER, LYOPHILIZED, FOR SOLUTION INTRAVENOUS at 22:03

## 2020-01-01 RX ADMIN — PIPERACILLIN AND TAZOBACTAM 25 GRAM(S): 4; .5 INJECTION, POWDER, LYOPHILIZED, FOR SOLUTION INTRAVENOUS at 21:54

## 2020-01-01 RX ADMIN — OXYCODONE HYDROCHLORIDE 10 MILLIGRAM(S): 5 TABLET ORAL at 07:00

## 2020-01-01 RX ADMIN — OXYCODONE HYDROCHLORIDE 10 MILLIGRAM(S): 5 TABLET ORAL at 06:28

## 2020-01-01 RX ADMIN — Medication 1: at 16:41

## 2020-01-01 RX ADMIN — PANTOPRAZOLE SODIUM 40 MILLIGRAM(S): 20 TABLET, DELAYED RELEASE ORAL at 05:11

## 2020-01-01 RX ADMIN — Medication 975 MILLIGRAM(S): at 12:18

## 2020-01-01 RX ADMIN — Medication 1 GRAM(S): at 11:23

## 2020-01-01 RX ADMIN — PANTOPRAZOLE SODIUM 40 MILLIGRAM(S): 20 TABLET, DELAYED RELEASE ORAL at 17:37

## 2020-01-01 RX ADMIN — Medication 50 GRAM(S): at 10:14

## 2020-01-01 RX ADMIN — CHLORHEXIDINE GLUCONATE 1 APPLICATION(S): 213 SOLUTION TOPICAL at 11:23

## 2020-01-01 RX ADMIN — Medication 1 GRAM(S): at 17:55

## 2020-01-01 RX ADMIN — PIPERACILLIN AND TAZOBACTAM 25 GRAM(S): 4; .5 INJECTION, POWDER, LYOPHILIZED, FOR SOLUTION INTRAVENOUS at 22:59

## 2020-01-01 RX ADMIN — PIPERACILLIN AND TAZOBACTAM 25 GRAM(S): 4; .5 INJECTION, POWDER, LYOPHILIZED, FOR SOLUTION INTRAVENOUS at 17:20

## 2020-01-01 RX ADMIN — Medication 50 MILLIGRAM(S): at 06:19

## 2020-01-01 RX ADMIN — PANTOPRAZOLE SODIUM 40 MILLIGRAM(S): 20 TABLET, DELAYED RELEASE ORAL at 17:38

## 2020-01-01 RX ADMIN — Medication 975 MILLIGRAM(S): at 08:29

## 2020-01-01 RX ADMIN — PIPERACILLIN AND TAZOBACTAM 25 GRAM(S): 4; .5 INJECTION, POWDER, LYOPHILIZED, FOR SOLUTION INTRAVENOUS at 08:03

## 2020-01-01 RX ADMIN — PIPERACILLIN AND TAZOBACTAM 25 GRAM(S): 4; .5 INJECTION, POWDER, LYOPHILIZED, FOR SOLUTION INTRAVENOUS at 14:14

## 2020-01-01 RX ADMIN — PIPERACILLIN AND TAZOBACTAM 25 GRAM(S): 4; .5 INJECTION, POWDER, LYOPHILIZED, FOR SOLUTION INTRAVENOUS at 08:15

## 2020-01-01 RX ADMIN — PIPERACILLIN AND TAZOBACTAM 25 GRAM(S): 4; .5 INJECTION, POWDER, LYOPHILIZED, FOR SOLUTION INTRAVENOUS at 16:41

## 2020-01-01 RX ADMIN — ATORVASTATIN CALCIUM 10 MILLIGRAM(S): 80 TABLET, FILM COATED ORAL at 21:18

## 2020-01-01 RX ADMIN — Medication 50 MILLIGRAM(S): at 05:24

## 2020-01-01 RX ADMIN — PANTOPRAZOLE SODIUM 40 MILLIGRAM(S): 20 TABLET, DELAYED RELEASE ORAL at 05:31

## 2020-01-01 RX ADMIN — SODIUM CHLORIDE 125 MILLILITER(S): 9 INJECTION INTRAMUSCULAR; INTRAVENOUS; SUBCUTANEOUS at 18:10

## 2020-01-01 RX ADMIN — ATORVASTATIN CALCIUM 10 MILLIGRAM(S): 80 TABLET, FILM COATED ORAL at 21:24

## 2020-01-01 RX ADMIN — Medication 50 MILLIGRAM(S): at 06:27

## 2020-01-01 RX ADMIN — PIPERACILLIN AND TAZOBACTAM 25 GRAM(S): 4; .5 INJECTION, POWDER, LYOPHILIZED, FOR SOLUTION INTRAVENOUS at 14:09

## 2020-01-01 RX ADMIN — PANTOPRAZOLE SODIUM 40 MILLIGRAM(S): 20 TABLET, DELAYED RELEASE ORAL at 06:00

## 2020-01-01 RX ADMIN — Medication 50 MILLIGRAM(S): at 05:14

## 2020-01-01 RX ADMIN — OXYCODONE HYDROCHLORIDE 10 MILLIGRAM(S): 5 TABLET ORAL at 11:55

## 2020-01-01 RX ADMIN — Medication 1 GRAM(S): at 18:31

## 2020-01-01 RX ADMIN — ATORVASTATIN CALCIUM 10 MILLIGRAM(S): 80 TABLET, FILM COATED ORAL at 21:41

## 2020-01-01 RX ADMIN — Medication 4: at 17:19

## 2020-01-01 RX ADMIN — Medication 650 MILLIGRAM(S): at 11:07

## 2020-01-01 RX ADMIN — ATORVASTATIN CALCIUM 10 MILLIGRAM(S): 80 TABLET, FILM COATED ORAL at 22:17

## 2020-01-01 RX ADMIN — PANTOPRAZOLE SODIUM 40 MILLIGRAM(S): 20 TABLET, DELAYED RELEASE ORAL at 05:24

## 2020-01-01 RX ADMIN — Medication 1: at 08:10

## 2020-01-01 RX ADMIN — PANTOPRAZOLE SODIUM 40 MILLIGRAM(S): 20 TABLET, DELAYED RELEASE ORAL at 16:18

## 2020-01-01 RX ADMIN — PIPERACILLIN AND TAZOBACTAM 25 GRAM(S): 4; .5 INJECTION, POWDER, LYOPHILIZED, FOR SOLUTION INTRAVENOUS at 21:18

## 2020-01-01 RX ADMIN — OXYCODONE HYDROCHLORIDE 10 MILLIGRAM(S): 5 TABLET ORAL at 21:48

## 2020-01-01 RX ADMIN — Medication 1 GRAM(S): at 06:27

## 2020-01-01 RX ADMIN — TRAMADOL HYDROCHLORIDE 50 MILLIGRAM(S): 50 TABLET ORAL at 21:18

## 2020-01-01 RX ADMIN — ATORVASTATIN CALCIUM 10 MILLIGRAM(S): 80 TABLET, FILM COATED ORAL at 21:31

## 2020-01-01 RX ADMIN — SODIUM CHLORIDE 125 MILLILITER(S): 9 INJECTION INTRAMUSCULAR; INTRAVENOUS; SUBCUTANEOUS at 12:32

## 2020-01-01 RX ADMIN — AZITHROMYCIN 255 MILLIGRAM(S): 500 TABLET, FILM COATED ORAL at 11:42

## 2020-01-01 RX ADMIN — FENTANYL CITRATE 1 PATCH: 50 INJECTION INTRAVENOUS at 20:00

## 2020-01-01 RX ADMIN — Medication 250 MILLIGRAM(S): at 05:34

## 2020-01-01 RX ADMIN — PIPERACILLIN AND TAZOBACTAM 25 GRAM(S): 4; .5 INJECTION, POWDER, LYOPHILIZED, FOR SOLUTION INTRAVENOUS at 16:36

## 2020-01-01 RX ADMIN — PIPERACILLIN AND TAZOBACTAM 25 GRAM(S): 4; .5 INJECTION, POWDER, LYOPHILIZED, FOR SOLUTION INTRAVENOUS at 00:01

## 2020-01-01 RX ADMIN — Medication 1: at 17:20

## 2020-01-01 RX ADMIN — Medication 50 GRAM(S): at 23:19

## 2020-01-01 RX ADMIN — PIPERACILLIN AND TAZOBACTAM 25 GRAM(S): 4; .5 INJECTION, POWDER, LYOPHILIZED, FOR SOLUTION INTRAVENOUS at 21:48

## 2020-01-01 RX ADMIN — SODIUM CHLORIDE 1000 MILLILITER(S): 9 INJECTION INTRAMUSCULAR; INTRAVENOUS; SUBCUTANEOUS at 15:00

## 2020-01-01 RX ADMIN — ENOXAPARIN SODIUM 40 MILLIGRAM(S): 100 INJECTION SUBCUTANEOUS at 11:58

## 2020-01-01 RX ADMIN — ENOXAPARIN SODIUM 40 MILLIGRAM(S): 100 INJECTION SUBCUTANEOUS at 12:12

## 2020-01-01 RX ADMIN — Medication 400 MILLIGRAM(S): at 13:21

## 2020-01-01 RX ADMIN — SODIUM CHLORIDE 125 MILLILITER(S): 9 INJECTION, SOLUTION INTRAVENOUS at 08:03

## 2020-01-01 RX ADMIN — MIDODRINE HYDROCHLORIDE 2.5 MILLIGRAM(S): 2.5 TABLET ORAL at 05:35

## 2020-01-01 RX ADMIN — CEFEPIME 100 MILLIGRAM(S): 1 INJECTION, POWDER, FOR SOLUTION INTRAMUSCULAR; INTRAVENOUS at 10:31

## 2020-01-01 RX ADMIN — Medication 1 GRAM(S): at 05:35

## 2020-01-01 RX ADMIN — PANTOPRAZOLE SODIUM 40 MILLIGRAM(S): 20 TABLET, DELAYED RELEASE ORAL at 17:26

## 2020-01-01 RX ADMIN — OXYCODONE HYDROCHLORIDE 10 MILLIGRAM(S): 5 TABLET ORAL at 11:52

## 2020-01-01 RX ADMIN — Medication 250 MILLIGRAM(S): at 09:34

## 2020-01-01 RX ADMIN — ATORVASTATIN CALCIUM 10 MILLIGRAM(S): 80 TABLET, FILM COATED ORAL at 21:39

## 2020-01-01 RX ADMIN — SODIUM CHLORIDE 125 MILLILITER(S): 9 INJECTION INTRAMUSCULAR; INTRAVENOUS; SUBCUTANEOUS at 21:20

## 2020-01-01 RX ADMIN — OXYCODONE HYDROCHLORIDE 10 MILLIGRAM(S): 5 TABLET ORAL at 21:06

## 2020-01-01 RX ADMIN — ATORVASTATIN CALCIUM 10 MILLIGRAM(S): 80 TABLET, FILM COATED ORAL at 20:54

## 2020-01-01 RX ADMIN — AMPICILLIN SODIUM AND SULBACTAM SODIUM 200 GRAM(S): 250; 125 INJECTION, POWDER, FOR SUSPENSION INTRAMUSCULAR; INTRAVENOUS at 11:30

## 2020-01-01 RX ADMIN — Medication 1 GRAM(S): at 05:42

## 2020-01-01 RX ADMIN — PANTOPRAZOLE SODIUM 40 MILLIGRAM(S): 20 TABLET, DELAYED RELEASE ORAL at 05:44

## 2020-01-01 RX ADMIN — CHLORHEXIDINE GLUCONATE 1 APPLICATION(S): 213 SOLUTION TOPICAL at 11:49

## 2020-01-01 RX ADMIN — PIPERACILLIN AND TAZOBACTAM 25 GRAM(S): 4; .5 INJECTION, POWDER, LYOPHILIZED, FOR SOLUTION INTRAVENOUS at 05:36

## 2020-01-01 RX ADMIN — Medication 100 GRAM(S): at 13:58

## 2020-01-01 RX ADMIN — Medication 3: at 11:31

## 2020-01-01 RX ADMIN — CEFEPIME 100 MILLIGRAM(S): 1 INJECTION, POWDER, FOR SOLUTION INTRAMUSCULAR; INTRAVENOUS at 09:32

## 2020-01-01 RX ADMIN — AZITHROMYCIN 500 MILLIGRAM(S): 500 TABLET, FILM COATED ORAL at 10:50

## 2020-01-01 RX ADMIN — MAGNESIUM OXIDE 400 MG ORAL TABLET 400 MILLIGRAM(S): 241.3 TABLET ORAL at 17:30

## 2020-01-01 RX ADMIN — SODIUM CHLORIDE 2000 MILLILITER(S): 9 INJECTION INTRAMUSCULAR; INTRAVENOUS; SUBCUTANEOUS at 08:21

## 2020-01-01 RX ADMIN — OXYCODONE HYDROCHLORIDE 10 MILLIGRAM(S): 5 TABLET ORAL at 22:59

## 2020-01-01 RX ADMIN — CEFEPIME 100 MILLIGRAM(S): 1 INJECTION, POWDER, FOR SOLUTION INTRAMUSCULAR; INTRAVENOUS at 17:13

## 2020-01-01 RX ADMIN — Medication 1: at 11:54

## 2020-01-01 RX ADMIN — SODIUM CHLORIDE 3000 MILLILITER(S): 9 INJECTION, SOLUTION INTRAVENOUS at 13:03

## 2020-01-01 RX ADMIN — OXYCODONE HYDROCHLORIDE 10 MILLIGRAM(S): 5 TABLET ORAL at 11:59

## 2020-01-01 RX ADMIN — PIPERACILLIN AND TAZOBACTAM 25 GRAM(S): 4; .5 INJECTION, POWDER, LYOPHILIZED, FOR SOLUTION INTRAVENOUS at 16:12

## 2020-01-01 RX ADMIN — Medication 0.25 MILLIGRAM(S): at 06:19

## 2020-01-01 RX ADMIN — PIPERACILLIN AND TAZOBACTAM 25 GRAM(S): 4; .5 INJECTION, POWDER, LYOPHILIZED, FOR SOLUTION INTRAVENOUS at 22:21

## 2020-01-01 RX ADMIN — PIPERACILLIN AND TAZOBACTAM 200 GRAM(S): 4; .5 INJECTION, POWDER, LYOPHILIZED, FOR SOLUTION INTRAVENOUS at 15:52

## 2020-01-01 RX ADMIN — Medication 50 GRAM(S): at 17:37

## 2020-01-01 RX ADMIN — Medication 1: at 12:20

## 2020-01-01 RX ADMIN — PANTOPRAZOLE SODIUM 40 MILLIGRAM(S): 20 TABLET, DELAYED RELEASE ORAL at 05:16

## 2020-01-01 RX ADMIN — Medication 1 GRAM(S): at 05:24

## 2020-01-01 RX ADMIN — ENOXAPARIN SODIUM 30 MILLIGRAM(S): 100 INJECTION SUBCUTANEOUS at 11:31

## 2020-01-01 RX ADMIN — ENOXAPARIN SODIUM 40 MILLIGRAM(S): 100 INJECTION SUBCUTANEOUS at 11:50

## 2020-01-01 RX ADMIN — Medication 1: at 08:03

## 2020-01-01 RX ADMIN — PANTOPRAZOLE SODIUM 40 MILLIGRAM(S): 20 TABLET, DELAYED RELEASE ORAL at 05:30

## 2020-01-01 RX ADMIN — PANTOPRAZOLE SODIUM 40 MILLIGRAM(S): 20 TABLET, DELAYED RELEASE ORAL at 18:25

## 2020-01-01 RX ADMIN — MAGNESIUM OXIDE 400 MG ORAL TABLET 400 MILLIGRAM(S): 241.3 TABLET ORAL at 13:15

## 2020-01-01 RX ADMIN — SODIUM CHLORIDE 1950 MILLILITER(S): 9 INJECTION INTRAMUSCULAR; INTRAVENOUS; SUBCUTANEOUS at 11:45

## 2020-01-01 RX ADMIN — Medication 1 GRAM(S): at 18:30

## 2020-01-01 RX ADMIN — Medication 1 GRAM(S): at 17:37

## 2020-01-01 RX ADMIN — CHLORHEXIDINE GLUCONATE 1 APPLICATION(S): 213 SOLUTION TOPICAL at 05:16

## 2020-01-01 RX ADMIN — ATORVASTATIN CALCIUM 10 MILLIGRAM(S): 80 TABLET, FILM COATED ORAL at 21:37

## 2020-01-01 RX ADMIN — PIPERACILLIN AND TAZOBACTAM 25 GRAM(S): 4; .5 INJECTION, POWDER, LYOPHILIZED, FOR SOLUTION INTRAVENOUS at 01:31

## 2020-01-01 RX ADMIN — Medication 50 GRAM(S): at 12:31

## 2020-01-01 RX ADMIN — Medication 2: at 12:06

## 2020-01-01 RX ADMIN — Medication 2: at 17:23

## 2020-01-01 RX ADMIN — Medication 250 MILLIGRAM(S): at 09:29

## 2020-01-01 RX ADMIN — PANTOPRAZOLE SODIUM 40 MILLIGRAM(S): 20 TABLET, DELAYED RELEASE ORAL at 06:02

## 2020-01-01 RX ADMIN — OXYCODONE HYDROCHLORIDE 10 MILLIGRAM(S): 5 TABLET ORAL at 19:22

## 2020-01-01 RX ADMIN — Medication 1 GRAM(S): at 17:35

## 2020-01-01 RX ADMIN — LISINOPRIL 2.5 MILLIGRAM(S): 2.5 TABLET ORAL at 06:00

## 2020-01-01 RX ADMIN — SODIUM CHLORIDE 1950 MILLILITER(S): 9 INJECTION INTRAMUSCULAR; INTRAVENOUS; SUBCUTANEOUS at 10:57

## 2020-01-01 RX ADMIN — LIDOCAINE 1 PATCH: 4 CREAM TOPICAL at 13:06

## 2020-01-01 RX ADMIN — PANTOPRAZOLE SODIUM 40 MILLIGRAM(S): 20 TABLET, DELAYED RELEASE ORAL at 06:27

## 2020-01-01 RX ADMIN — SODIUM CHLORIDE 50 MILLILITER(S): 9 INJECTION INTRAMUSCULAR; INTRAVENOUS; SUBCUTANEOUS at 06:03

## 2020-01-01 RX ADMIN — PIPERACILLIN AND TAZOBACTAM 25 GRAM(S): 4; .5 INJECTION, POWDER, LYOPHILIZED, FOR SOLUTION INTRAVENOUS at 16:05

## 2020-01-01 RX ADMIN — OXYCODONE HYDROCHLORIDE 10 MILLIGRAM(S): 5 TABLET ORAL at 11:21

## 2020-05-05 PROBLEM — E78.5 HLD (HYPERLIPIDEMIA): Status: ACTIVE | Noted: 2020-01-01

## 2020-05-05 PROBLEM — E11.9 T2DM (TYPE 2 DIABETES MELLITUS): Status: ACTIVE | Noted: 2020-01-01

## 2020-05-05 PROBLEM — I10 BENIGN ESSENTIAL HTN: Status: ACTIVE | Noted: 2020-01-01

## 2020-05-05 PROBLEM — R12 HEARTBURN: Status: ACTIVE | Noted: 2020-01-01

## 2020-05-05 NOTE — REVIEW OF SYSTEMS
[As Noted in HPI] : as noted in HPI [Negative] : Endocrine [Fever] : no fever [Chills] : no chills [Diarrhea] : no diarrhea [Constipation] : no constipation [Melena] : no melena

## 2020-05-05 NOTE — REASON FOR VISIT
[Other: _____] : [unfilled] [Acute] : an acute visit [FreeTextEntry1] : new vomiting without nausea, unintentional weight loss

## 2020-05-05 NOTE — ASSESSMENT
[FreeTextEntry1] : Overall, unclear etiology of symptoms.  May be a post-infectious or diabetic gastroparesis.  It does not sound like regurgitation.  His accompanied weight loss is concerning.\par \par - NM gastric emptying study\par - Barium esophagram\par - EGD, semiurgent\par \par Follow up TBD

## 2020-05-05 NOTE — CONSULT LETTER
[Dear  ___] : Dear  [unfilled], [Please see my note below.] : Please see my note below. [Courtesy Letter:] : I had the pleasure of seeing your patient, [unfilled], in my office today. [Consult Closing:] : Thank you very much for allowing me to participate in the care of this patient.  If you have any questions, please do not hesitate to contact me. [FreeTextEntry3] : Very truly yours,\par \par STACEY Adhikari MD\par [facility]\par Gastroenterology at [place]\par [address], [address]\[place] [address]\par Tel [phone]\par Fax [phone]

## 2020-05-05 NOTE — HISTORY OF PRESENT ILLNESS
[Verbal consent obtained from patient] : the patient, [unfilled] [de-identified] : This is a 59-year-old man with a past medical history of DM, HTN, and CKD who presents as a follow up with new onset vomiting.  He reports that approximately 3 weeks ago, he started to experience a cough that is followed by an epigastric burning sensation, and then vomiting without nausea.  The typically occurs at dinner, rarely lunch, and never with breakfast.  He reports having undergone a negative abdominal ultrasound recently, ordered by his PCP who also prescribed ondansetron and benzoate.  He reports a sensation of food getting stuck in his epigastrium and compares the burning sensation to something akin to heartburn.  He reports a 5-10-lb weight loss in this time period.  He has never undergone an EGD.\par \par ..

## 2020-05-06 NOTE — ED STATDOCS - NSFOLLOWUPINSTRUCTIONS_ED_ALL_ED_FT
Follow up with gastroenterology after covid results negative or 2 weeks from now.  READ ALL ATTACHED INSTRUCTIONS FOR CORONAVIRUS IMMEDIATELY   -You were tested for COVID19 (Coronavirus) during your visit in the Emergency Department.   -Results will take 5-7 days  -Do NOT return to work/school/public areas until your COVID test results as negative.   -SELF QUARANTINE until COVID result is available.   -Avoid contact with others.   -Wash your hands frequently. Disinfect surfaces frequently    SEEK IMMEDIATE MEDICAL CARE IF YOU HAVE ANY OF THE FOLLOWING SYMPTOMS  **If you develop worsening or new symptoms such as shortness of breath, difficulty breathing, chest pain, confusion, severe weakness, or anything concerning to you, please seek immediate medical care or return to the ER .**

## 2020-05-06 NOTE — ED STATDOCS - OBJECTIVE STATEMENT
58 y/o M pt with significant PMHx of presents to the ED c/o 60 y/o M pt with significant PMHx of DM, HLD, and HTN presents to the ED c/o vomiting for the last 3 weeks. He states its usually around dinner time. He states that he starts to eat, and 5 minutes later gets a "tickle" in his throat, followed by heart burn and then has episodes of emesis. He notes 9 pound weight loss in the last 3 weeks. He states that no he is unable to tolerate and PO intake aside from water. He states that it feels like he has something stuck in his esophagus. Denies f/c/n/cp/sob/palpitations/cough/rash/headache /dizziness/ abd.pain/d/c/dysuria/hematuria. Pt notes that he recently had a US done with normal results. 60 y/o M pt with significant PMHx of DM, HLD, and HTN presents to the ED c/o vomiting for the last 3 weeks. He states its usually around dinner time. He states that he starts to eat, and 5 minutes later gets a "tickle" in his throat, followed by heart burn and then has episodes of emesis. He notes 9 pound weight loss in the last 3 weeks. He states that no he is unable to tolerate and PO intake aside from water x since yesterday, vomited after breakfast this morning.. He states that it feels like he has something stuck in his esophagus/stomach. Denies f/c/n/cp/sob/palpitations/cough/rash/headache /dizziness/ abd.pain/d/c/dysuria/hematuria. Pt notes that he recently had a US done with normal results. trying to schedule an endoscopy with his GI doctor but was unable to do so bc of COVID pandemic.

## 2020-05-06 NOTE — ED ADULT NURSE NOTE - OBJECTIVE STATEMENT
Pt. complaining of LLQ abdominal pain and vomiting following food ingestion.  Pt. states for the last 3 weeks he has only been able to eat breakfast successfully but come lunch and dinner, minutes following ingestion he develops pain in the LLQ, gets nauseas and vomits.  Pt. states weight loss of 9 lbs over the 3 week period.  Pt. denies fevers/sick contacts/diarrhea/brbpr.

## 2020-05-06 NOTE — ED STATDOCS - ATTENDING CONTRIBUTION TO CARE
I, Isabel Lepe, performed the initial face to face bedside interview with this patient regarding history of present illness, review of symptoms and relevant past medical, social and family history.  I completed an independent physical examination.  I was the initial provider who evaluated this patient. I have signed out the follow up of any pending tests (i.e. labs, radiological studies) to the ACP.  I have communicated the patient’s plan of care and disposition with the ACP.

## 2020-05-06 NOTE — ED STATDOCS - PROGRESS NOTE DETAILS
Pt seen by intake MD and agree with HPI/ROS/PE/Plan. Pt appears well in no acute distress. He is awaiting results. No abd tenderness. Pt tolerating PO in ER. Still no abd tenderness. Pts CT indicates potential covid infection. Pt told of results and questions answered. He was given return precautions. Pt told the need for self isolation for 2 weeks depending on results. COVID swabs obtained.  Advised home quarantine until test results.  If test positive, requires home quarantine for 14-days.  Anticipatory guidance provided and supportive care recommended. Advised immediate return if worsening symptoms, especially if short of breath. Pt tolerating PO in ER. Still no abd tenderness. Pts CT indicates potential covid infection. Pt told of results and questions answered. He was given return precautions. Pt told the need for self isolation for 2 weeks depending on results. COVID swabs obtained.  Advised home quarantine until test results.  If test positive, requires home quarantine for 14-days.  Anticipatory guidance provided and supportive care recommended. Advised immediate return if worsening symptoms, especially if short of breath. Pt also told of kidney function and it was compared to old lab work. Pt will f/u. Pt tolerating PO in ER. Still no abd tenderness. Pts CT indicates potential covid infection. Pt has not had symptopms (fever/SOB/CP/cough) Pt told of results and questions answered. He was given return precautions. Pt told the need for self isolation for 2 weeks depending on results. COVID swabs obtained.  Advised home quarantine until test results.  If test positive, requires home quarantine for 14-days.  Anticipatory guidance provided and supportive care recommended. Advised immediate return if worsening symptoms, especially if short of breath. Pt also told of kidney function and it was compared to old lab work. Pt will f/u.

## 2020-05-06 NOTE — ED ADULT TRIAGE NOTE - CHIEF COMPLAINT QUOTE
Pt has been vomiting daily for the last 3 weeks. Reports that when he starts to eat it is difficult to get his food down. He then vomits it back up a few minutes later but then he is able to eat shortly after this and is able to tolerate it. Denies any abdominal pain at this time.

## 2020-05-06 NOTE — ED STATDOCS - CARE PLAN
Principal Discharge DX:	Vomiting  Secondary Diagnosis:	Suspected COVID-19 virus infection Principal Discharge DX:	Vomiting  Secondary Diagnosis:	Suspected COVID-19 virus infection  Secondary Diagnosis:	ISIDRO (acute kidney injury)

## 2020-05-06 NOTE — ED STATDOCS - CLINICAL SUMMARY MEDICAL DECISION MAKING FREE TEXT BOX
severe GERD vs esophageal stricture less likely as able to tolerate food before dinner vs malignancy will check labs CT and reassess

## 2020-05-06 NOTE — ED STATDOCS - PATIENT PORTAL LINK FT
You can access the FollowMyHealth Patient Portal offered by [facility] by registering at the following website: [email or web address]. By joining [facility]’s FollowMyHealth portal, you will also be able to view your health information using other applications (apps) compatible with our system.

## 2020-05-06 NOTE — ED STATDOCS - CARE PROVIDER_API CALL
Francine Mora (DO)  Gastroenterology  [address], [address]  [place] [address]  Phone: [phone]  Fax: [phone]  Follow Up Time:

## 2020-05-18 NOTE — ED ADULT TRIAGE NOTE - CCCP TRG CHIEF CMPLNT
hand pain/injury

## 2020-05-20 PROBLEM — R63.4 WEIGHT LOSS, UNINTENTIONAL: Status: ACTIVE | Noted: 2020-01-01

## 2020-05-20 NOTE — PROCEDURE
[With Biopsy] : with biopsy [Procedure Explained] : The procedure was explained [Allergies Reviewed] : allergies reviewed. [Risks] : Risks [Benefits] : benefits [Consent Obtained] : written consent was obtained prior to the procedure and is detailed in the patient's record [Alternatives] : alternatives [Patient] : the patient [Automated Blood Pressure Cuff] : automated blood pressure cuff [Cardiac Monitor] : cardiac monitor [Pulse Oximeter] : pulse oximeter [Propofol ___ mg IV] : Propofol [unfilled] ~Umg intravenously [Sedation Clearance] : the patient was cleared for moderate sedation [Performed By: ___] : Performed by:  CAROLINE [Biopsy] : biopsy [Mass] : mass [Erythema] : erythema [Normal] : Normal [Sent to Pathology] : was sent to pathology for analysis [Vital Signs Stable] : the vital signs were stable [Tolerated Well] : the patient tolerated the procedure well [de-identified] : Dysphagia, weight loss, vomiting without nausea, abnormal imaging [de-identified] : Large ulcerated mass extending from 30 cm to 40 cm to cardia.  Foreign bodies removed from esophagus via Riley net.

## 2020-05-20 NOTE — ASSESSMENT
[FreeTextEntry1] : Likely esophageal cancer.  ? Foreign bodies removed from esophagus via Riley net.  Multiple biopsies taken from mass.  Standard gastroscope was unable to traverse the mass.  The ultrathin scope was able to be passed and revealed tumor extension about 10 cm.  The mass was ulcerated and very friable.\par \par - Follow up pathology\par - CT chest\par - CTS consultation\par - Will discuss with my partner Dr. Mckee about placement of stent

## 2020-05-20 NOTE — REASON FOR VISIT
Routine BMP during new clinic visit, noted K2.8. Patient is on Lasix 40mg BID PO. Not on PTA potassium supplement. Called patient today, discussed his results. Ordered KCL 20meq  Daily with repeat BMP / Mag on 7/24/18 advised to forward results to Dr. Tejeda / Dr. Morrison and PcP.     Kelly Zapata M.D.  Page # [phone]  Cardiovascular Fellow PGY-IV  [facility]      [Procedure: _________] : a [unfilled] procedure visit

## 2020-05-28 PROBLEM — K22.8 ESOPHAGEAL MASS: Status: ACTIVE | Noted: 2020-01-01

## 2020-05-28 NOTE — HISTORY OF PRESENT ILLNESS
[de-identified] : Patient with esophageal cancer noted on recent EGD. Patient is here for possible EUS and esophageal stent placement. Able to eat puree food.

## 2020-06-01 PROBLEM — K21.9 GASTROESOPHAGEAL REFLUX DISEASE: Status: RESOLVED | Noted: 2020-01-01 | Resolved: 2020-01-01

## 2020-06-01 PROBLEM — Z80.1 FAMILY HISTORY OF LUNG CANCER: Status: ACTIVE | Noted: 2020-01-01

## 2020-06-01 PROBLEM — I10 HYPERTENSION: Status: RESOLVED | Noted: 2020-01-01 | Resolved: 2020-01-01

## 2020-06-01 PROBLEM — E78.5 HYPERLIPIDEMIA: Status: RESOLVED | Noted: 2020-01-01 | Resolved: 2020-01-01

## 2020-06-01 PROBLEM — C15.5 MALIGNANT NEOPLASM OF LOWER THIRD OF ESOPHAGUS: Status: ACTIVE | Noted: 2020-01-01

## 2020-06-01 PROBLEM — E11.9 DIABETES MELLITUS: Status: RESOLVED | Noted: 2020-01-01 | Resolved: 2020-01-01

## 2020-06-01 PROBLEM — Z80.0 FAMILY HISTORY OF THROAT CANCER: Status: ACTIVE | Noted: 2019-03-21

## 2020-06-01 NOTE — DISEASE MANAGEMENT
[Clinical] : TNM Stage: c [III] : III [FreeTextEntry4] : adenocarcinoma [NTNM] : 2 [TTNM] : 2 [MTNM] : 0 [de-identified] : GE junction and adjacent LNs [de-identified] : [id number]

## 2020-06-01 NOTE — HISTORY OF PRESENT ILLNESS
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  He initially saw GI, Dr. Jacek Adhikari, in March 2019 due to anemia.  A colonoscopy was done in July 2019 and an EGD was discussed but not done until 5/20/20 when he was f/w an esophageal mass.  Pathology c/w adenocarcinoma.  Staging CT showed  marked thickening of GE junction without definable tumor. There is evidence of small gastrohepatic ligament lymph nodes measuring up to 1.2 cm in size.  New radiographic finding of a mid small bowel lesion with post symmetrical and asymmetrical thickening of the bowel wall without significant surrounding inflammatory process or definable intussusception. Given the focal nature this is more suspicious for small bowel tumor as opposed to an inflammatory process. [de-identified] : Esophageal adenocarcinoma, HER-2 positive [de-identified] : Past medical history of DM, HTN, and CKD\par SH: Non - smoker and no ETOH.  No environmental exposures.  No hx of reflux.  There is a hx of second hand smoke.  Pt lives with his mother.   Pt works as a  but now on unemployment.   [de-identified] : He presents for initial visit.  Pt reports an 8 week hx of inability to tolerate solid foods, he states mostly at end of day.  Able to tolerate liquids.  Due to inability to tolerate solids and only puréed diet with weight loss, GI placed esophageal stent 5/28/20 and he can now tolerate solids that are soft.   He reports a 20 -lb weight loss over 8 weeks.  Screening colonoscopy 7/19/20

## 2020-06-01 NOTE — LETTER GREETING
[Dear Doctor] : Dear Doctor, [Consult Letter:] : Your patient, [unfilled] was seen in my office today for consultation. [Please see my note below.] : Please see my note below. [FreeTextEntry2] : Sarah Santiago MD\par Cory Cummins MD

## 2020-06-01 NOTE — VITALS
[Maximal Pain Intensity: 2/10] : 2/10 [Least Pain Intensity: 0/10] : 0/10 [Pain Location: ___] : Pain Location: [unfilled] [OTC] : OTC [80: Normal activity with effort; some signs or symptoms of disease.] : 80: Normal activity with effort; some signs or symptoms of disease.  [ECOG Performance Status: 1 - Restricted in physically strenuous activity but ambulatory and able to carry out work of a light or sedentary nature] : Performance Status: 1 - Restricted in physically strenuous activity but ambulatory and able to carry out work of a light or sedentary nature, e.g., light house work, office work [4 - Distress Level] : Distress Level: 4

## 2020-06-01 NOTE — CONSULT LETTER
[Dear  ___] : Dear  [unfilled], [Consult Letter:] : I had the pleasure of evaluating your patient, [unfilled]. [Please see my note below.] : Please see my note below. [Consult Closing:] : Thank you very much for allowing me to participate in the care of this patient.  If you have any questions, please do not hesitate to contact me. [Sincerely,] : Sincerely, [DrGume  ___] : Dr. VELAZQUEZ [FreeTextEntry3] : Dr. Ana Santiago  [DrGume ___] : Dr. VELAZQUEZ

## 2020-06-01 NOTE — HISTORY OF PRESENT ILLNESS
[FreeTextEntry1] : This 58-year-old man presents for radiation medicine consultation.  \par \par Gastroenterologist Williams Adhikari performed EGD at [facility].  He initially visisted Dr. Adhikari on March 21, 2019 for anemia, first noted in August of 2018 when he presented to [facility] after an insect bite (hemoglobin 11.4 g/dL).  On repeat blood work done in February 2019, his hemoglobin had nearly normalized.\par \par Pathology 5/20/2020: \par 1. Distal esophagus "mass" biopsy -- adenocarcinoma.\par 2. Gastric, random biopsy -- chronic gastritis.\par 3. Foriegn body removal -- gross diagnosis only.\par \par \par \par CT abdomen and pelvis 5/26/2020: \par When compared to previous recent examination there is a new radiographic finding of a mid small bowel lesion with post symmetrical and asymmetrical thickening of the bowel wall without significant surrounding inflammatory process or definable intussusception. Given the focal nature this is more suspicious for small bowel tumor as opposed to an inflammatory\par process.  In a patient with recently diagnosed esophageal cancer marked thickening GE junction is seen without definable tumor. There is evidence of small gastrohepatic ligament lymph nodes measuring up to 1.2 cm in size.\par \par Per Dr. Tristian Mckee, ther is marked thickening at GE junction without definable tumor. There is evidence of small gastrohepatic ligament lymph nodes measuring up to 1.2 cm in si

## 2020-06-01 NOTE — REVIEW OF SYSTEMS
[Abdominal Pain] : abdominal pain [Negative] : Heme/Lymph [FreeTextEntry7] : moderate and intermittent since distal esophageal stent placed 2 days ago.

## 2020-06-01 NOTE — LETTER CLOSING
[Consult Closing:] : Thank you for allowing me to participate in the care of this patient.  If you have any questions, please do not hesitate to contact me. [Sincerely yours,] : Sincerely yours, [FreeTextEntry3] : Romaine Parson MD\par Physician in Chief\par Department of Radiation Medicine\par [facility]\par [facility]\par \par  of Radiation Medicine\par [facility]\par at  [facility]\par \par Radiation \par [facility]/\par [facility]\par [address]\par [place], [place] [address]\par \par Tel: [phone]\par Fax: [phone]\par

## 2020-06-08 PROBLEM — Z77.22 SECONDHAND SMOKE EXPOSURE: Status: ACTIVE | Noted: 2020-01-01

## 2020-06-12 PROBLEM — E83.42 HYPOMAGNESEMIA: Status: ACTIVE | Noted: 2020-01-01

## 2020-06-15 NOTE — HISTORY OF PRESENT ILLNESS
[de-identified] : 59  year-old male presents for an initial consultation.  He initially saw GI, Dr. Jacek Adhikari, in March 2019 due to anemia. A colonoscopy was done in July 2019 and an EGD was discussed but not done until 5/20/20 when he was discovered to have an esophageal mass. Pathology was consistent with adenocarcinoma. Staging CT showed marked thickening of GE junction without definable tumor. There is evidence of small gastrohepatic ligament lymph nodes measuring up to 1.2 cm in size. New radiographic finding of a mid small bowel lesion with post symmetrical and asymmetrical thickening of the bowel wall without significant surrounding inflammatory process or definable intussusception. Given the focal nature this is more suspicious for small bowel tumor as opposed to an inflammatory process. \par \par On 5/28/20 Dr. Tristian Mckee performed an EUS with stent placement for palliation of dysphagia.  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  EUS suggestive of a T3 lesion with no adjacent lymphadenopathy, however, the full extent of the mass could not be evaluated as the scope could not be advanced into the GEJ area.\par \par He was evaluated by Dr. Ana Santiago from medical oncology and Dr. Romaine Parson from radiation oncology.  Plan is to start induction chemotherapy with carbo/taxol x 3 weeks, followed by CRT.  He is currently pending a PET/CT for staging and to further evaluate the small bowel lesion. \par \par He has also established care with Dr. Mars Lerner from [facility].\par \par He initially reported an 8 week history of inability to tolerate solid foods with resultant 20 lb weight loss.  Post stent placement, he has been better able to tolerate solids and has been gaining weight. \par \par His past medical history includes hypertension, hyperlipidemia, type 2 diabetes, CKD stage III

## 2020-06-15 NOTE — ASSESSMENT
[FreeTextEntry1] : IMP:\par -Distal esophageal/GEJ adenocarcinoma.  Staged T3N0MX by EUS but limited exam given inability to pass the scope into the stomach.  S/p [employer] WallFlex stent placement for palliation of dysphagia. \par -Mid small bowel lesion with post symmetrical and asymmetrical thickening of the bowel wall without significant surrounding inflammatory process or definable intussusception. Given the focal nature this is more suspicious for small bowel tumor as opposed to an inflammatory process\par -Care established with Dr. Lerner from [facility]\par \par PLAN:\par -PET/CT for staging and to evaluate the small bowel\par -Neoadjuvant chemotherapy followed by CRT\par -Will plan for robotic Manjeet-Joes esophagogastrectomy with Thoracic 6-8 weeks after neoadjuvant complete.  We discussed the risks, benefits, and alternatives of the procedure with the patient, he expressed understanding and agree to proceed.  His mother was also present in the room for the entire discussion - we answered all her questions.

## 2020-06-15 NOTE — PHYSICAL EXAM
[Normal] : supple, no neck mass and thyroid not enlarged [Normal Neck Lymph Nodes] : normal neck lymph nodes  [Normal Groin Lymph Nodes] : normal groin lymph nodes [Normal Supraclavicular Lymph Nodes] : normal supraclavicular lymph nodes [Normal Axillary Lymph Nodes] : normal axillary lymph nodes [Normal] : grossly intact [de-identified] : soft NT ND

## 2020-06-25 NOTE — ED PROVIDER NOTE - PHYSICAL EXAMINATION
Gen: no acute distress  Head: normocephalic, atraumatic  Lung: CTAB, no respiratory distress, no wheezing, rales, rhonchi  CV: normal s1/s2, regular rhythm, tachycardia  Abd: soft, non-tender, non-distended  MSK: No edema, no visible deformities, full range of motion in all 4 extremities  Neuro: No focal neurologic deficits  Skin: No rash   Psych: normal affect

## 2020-06-25 NOTE — ED PROVIDER NOTE - CLINICAL SUMMARY MEDICAL DECISION MAKING FREE TEXT BOX
60 y/o M pt with hx of htn, dm, esophageal ca presenting today with fever, tachycardia and hypoxia. Suspect respiratory source vs neutropenic fever due to hx of being on chemo. Will order labs, ekg, cxr, trop, ua, bcx, ucx, fluids, abx, tylenol, and reassess.

## 2020-06-25 NOTE — ED PROVIDER NOTE - OBJECTIVE STATEMENT
58 y/o M pt with hx of DM, HTN, and esophageal ca currently on chemo tx weekly presenting today with fever and hypoxia. Pt states that this morning he woke up and had a coughing episode. Pt went to sleep, then woke up several hours later with another coughing episode with fevers/chills. Per EMS when they arrived, pt was satting 88% on ra with improvement to 100% on nrb. Pt denies any chest pain, dyspnea, n/v/d, abdominal pain, dysuria, headache, congestion, back pain, weakness, numbness, tingling, dizziness, syncope, or other complaint. 60 y/o M pt with hx of DM, HTN, and esophageal ca currently on chemo tx weekly (last chemo Thursday) presenting today with fever and hypoxia. Pt states that this morning he woke up and had a coughing episode. Pt went to sleep, then woke up several hours later with another coughing episode with fevers/chills. Per EMS when they arrived, pt was satting 88% on ra with improvement to 100% on nrb. Pt denies any chest pain, dyspnea, n/v/d, abdominal pain, dysuria, headache, congestion, back pain, weakness, numbness, tingling, dizziness, syncope, or other complaint.

## 2020-06-25 NOTE — H&P ADULT - NSICDXPASTMEDICALHX_GEN_ALL_CORE_FT
PAST MEDICAL HISTORY:  Diabetes     Esophageal cancer     History of gastroesophageal reflux (GERD)     HLD (hyperlipidemia)     HTN (hypertension)

## 2020-06-25 NOTE — ED ADULT NURSE NOTE - OBJECTIVE STATEMENT
Pt is here with c/o cough and fever that he woke up with at 3am this morning.  Pt is a cancer pt and had 2 doses of chemo and is due for his third dose tomorrow.   Pt is a/ox4, Denies SOB or chest pain.  C/o chronic low back pain

## 2020-06-25 NOTE — H&P ADULT - ASSESSMENT
REASON FOR CONSULT:  HCAP    SUBJECTIVE: Per HPI        OBJECTIVE: Per HPI    PHYSICAL EXAM: Tele-evaluation precludes physical exam. Patient is AAOx3, good historian, not in distress.           ASSESSMENT AND PLAN: 58 y/o M pt with hx of DM, HTN, and esophageal ca currently on chemo tx weekly (last chemo Thursday) presenting today with fever and hypoxia admitted for sepsis due to HCAP:      - Admit to Tele under Dr. Griffith's service.  - Check Lactate   -Start IV fluids, NS@100ml per hour.  -S/p IV Vanco and Cefepime in ER. Will continue Cefepime. Monitor renal function.  -F/u COVID 19 PCR  -F/u blood cultures.  -Tyelenol PRN fever  -HTN: BP on low side. Will hold Lisinopril  -Probably had CKD. Creatinine 1.7 today . Was also elevated in May . Avoid Nephrotoxins.  -Type2 DM; ISS, Hypoglycemia protocol. Check HbA1c. Carbohydrate control diet   -DVT PPX: SubQ Lovenox    Care plan discussed with (***) REASON FOR CONSULT:  HCAP    SUBJECTIVE: Per HPI        OBJECTIVE: Per HPI    PHYSICAL EXAM: Tele-evaluation precludes physical exam. Patient is AAOx3, good historian, not in distress.           ASSESSMENT AND PLAN: 60 y/o M pt with hx of DM, HTN, and esophageal ca currently on chemo tx weekly (last chemo Thursday) presenting today with fever and hypoxia admitted for sepsis due to HCAP:      - Admit to Tele under Dr. Griffith's service.  - Check Lactate   -Start IV fluids, NS@100ml per hour.  -S/p IV Vanco and Cefepime in ER. Will continue Cefepime. Monitor renal function.  -F/u COVID 19 PCR  -F/u blood cultures.  -Tyelenol PRN fever  -HTN: BP on low side. Will hold Lisinopril  -Probably had CKD. Creatinine 1.7 today . Was also elevated in May . Avoid Nephrotoxins.  -Type2 DM; ISS, Hypoglycemia protocol. Check HbA1c. Carbohydrate control diet   -DVT PPX: SubQ Lovenox    Care plan discussed with Dr. Griffith, Admitting Hospitalist REASON FOR CONSULT:  HCAP    SUBJECTIVE: Per HPI        OBJECTIVE: Per HPI    PHYSICAL EXAM: Tele-evaluation precludes physical exam. Patient is AAOx3, good historian, not in distress.           ASSESSMENT AND PLAN: 58 y/o M pt with hx of DM, HTN, and esophageal ca currently on chemo tx weekly (last chemo Thursday) presenting today with fever and hypoxia admitted for sepsis due to HCAP:      - Admit to Tele under Dr. Griffith's service.  - Check Lactate   -Start IV fluids, NS@100ml per hour.  -S/p IV Vanco and Cefepime in ER. Will continue Cefepime. Monitor renal function.  -F/u COVID 19 PCR  -F/u blood cultures.  -Tyelenol PRN fever  -HTN: BP on low side. Will hold Lisinopril  -Probably had CKD. Creatinine 1.7 today . Was also elevated in May . Avoid Nephrotoxins.  -Type2 DM; ISS, Hypoglycemia protocol. Check HbA1c. Carbohydrate control diet   -DVT PPX: SubQ Lovenox  -Hem/ Onc Consult per primary team.    Care plan discussed with Dr. Griffith, Admitting Hospitalist

## 2020-06-25 NOTE — ED ADULT TRIAGE NOTE - CHIEF COMPLAINT QUOTE
Fevers that began today with chronic cough.  Pt found to be hypoxic 88% on room air by EMS.  Respirations even and unlabord, speaking in full sentences in triage.  Currently undergoing chemotherapy for esophageal cancer.  Pt directed to critical care, Dr. Garza called to bedside.

## 2020-06-25 NOTE — H&P ADULT - ATTENDING COMMENTS
Pt seen and examined as per physical exam I typed in physical exam section     1) HCAP  - admit to medicine  - since pt is on chemo concern for gram positive and gram negative organism  - started on vanco and cefepime, will continue  - blood cx, sputum cx and urine legionella pending  - O2 and pulse ox  - ID consulted    2) Sepsis due to PNA  - plan as above  - lactics is elevated  - s/p 2L NS bolus in ED, will give 3rd bolus then NS 125cc/hr  - repeat lactic acid leve  - monitored bed    3) DM type 2  - hold oral meds  - CSI and fingerstick monitoring    4) HTN  - hold BP meds for now   - monitor    5) HLD  - on statin     6) Esophageal cancer s/p stent on chemo  - continue PPI  - outpt follow up with heme/onc    7) Prophylactic measure  - DVT ppx: Lovenox SQ     I agree with remaining assessment and plan as above.

## 2020-06-25 NOTE — ED PROVIDER NOTE - CARE PLAN
Principal Discharge DX:	HCAP (healthcare-associated pneumonia)  Secondary Diagnosis:	Esophageal cancer Principal Discharge DX:	HCAP (healthcare-associated pneumonia)  Secondary Diagnosis:	Esophageal cancer  Secondary Diagnosis:	Sepsis

## 2020-06-25 NOTE — ED ADULT NURSE NOTE - NSFALLRSKASSESSTYPE_ED_ALL_ED
Composition/Volume/Concentration/Rate/Route/glucerna 1.2 @60cc/hr x24hrs:1458cc/1750kcal//92gmprotein via PEG Initial (On Arrival)

## 2020-06-25 NOTE — PHARMACOTHERAPY INTERVENTION NOTE - COMMENTS
Contacted patient's outpatient pharmacy to obtain complete medication list and spoke with patient to clarify medications.    Home Medications:  atorvastatin 10 mg oral tablet: 1 tab(s) orally once a day (:08)  lisinopril 2.5 mg oral tablet: 1 tab(s) orally once a day (:08)  metFORMIN 1000 mg oral tablet: 1 tab(s) orally 2 times a day (:08)  ondansetron 8 mg oral tablet, disintegratin tab(s) orally every 8 hours, As Needed - for nausea (:08)  pantoprazole 40 mg oral delayed release tablet: 1 tab(s) orally once a day (:08)  prochlorperazine 10 mg oral tablet: 1 tab(s) orally every 6 hours, As Needed - for nausea (:08)  traMADol 50 mg oral tablet: 1 tab(s) orally every 4-6 hours, As Needed for pain (:08)

## 2020-06-25 NOTE — H&P ADULT - HISTORY OF PRESENT ILLNESS
60 y/o M pt with hx of DM, HTN, and esophageal ca currently on chemo tx weekly (last chemo Thursday) presenting today with fever , cough and hypoxia. Pt states that this morning he woke up and had a coughing episode. Pt went to sleep, then woke up several hours later with another coughing episode with fevers/chills. Per EMS when they arrived, pt was satting 88% on ra with improvement to 100% on nrb. Pt denies any chest pain, dyspnea, n/v/d, abdominal pain, dysuria, headache, congestion, back pain, weakness, numbness, tingling, dizziness, syncope, or other complaint. Feeling better now. States that he had esophageal stent placed on 05/28/20. Denies difficulty swallowing, chest pain.

## 2020-06-25 NOTE — ED PROVIDER NOTE - ATTENDING CONTRIBUTION TO CARE
Kayla: I performed a face to face bedside interview with patient regarding history of present illness, review of symptoms and past medical history. I completed an independent physical exam.  I have discussed patient's plan of care with resident.   I agree with note as stated above including HISTORY OF PRESENT ILLNESS, HIV, PAST MEDICAL/SURGICAL/FAMILY/SOCIAL HISTORY, ALLERGIES AND HOME MEDICATIONS, REVIEW OF SYSTEMS, PHYSICAL EXAM, MEDICAL DECISION MAKING and any PROGRESS NOTES during the time I functioned as the attending physician for this patient unless otherwise noted. My brief assessment is as follows: 59M h/o esophageal ca, last chemo thursday p/w hypoxia, cough, fever, chills starting overnight. Satting 88% on RA with EMS, low 90s on RA in ED. Denies CP, urinary symptoms, vomiting, abd pain.  Gen: Well appearing in NAD  Head: NC/AT  Neck: trachea midline  Resp:  No distress, CTAB  CV: tachycardic, regular rhythm  GI: soft, NTND  Ext: no deformities  Neuro:  A&O appears non focal  Skin:  Warm and dry as visualized  Psych:  Normal affect and mood   Sepsis - likely PNA, concern for neutropenic fever  1) IV Access/IVF (30cc/kg bolus)/LABS/Lactate (rpt after IVF if elevated)/UA/Cultures  2) CXR, COVID  3) IV Abx  4) Admit

## 2020-06-25 NOTE — H&P ADULT - NSHPLABSRESULTS_GEN_ALL_CORE
9.1    6.21  )-----------( 325      ( 25 Jun 2020 08:19 )             27.7       06-25    133<L>  |  95<L>  |  33.0<H>  ----------------------------<  232<H>  5.2   |  22.0  |  1.70<H>    Ca    9.5      25 Jun 2020 08:19    TPro  7.4  /  Alb  3.8  /  TBili  0.3<L>  /  DBili  x   /  AST  22  /  ALT  23  /  AlkPhos  91  06-25      < from: Xray Chest 1 View-PORTABLE IMMEDIATE (06.25.20 @ 08:08) >      IMPRESSION:     Left lower lobe pneumonia.                  MORRIS WALTON M.D., ATTENDING RADIOLOGIST  This document has been electronically signed. Jun 25 2020  8:29AM        < end of copied text >

## 2020-06-25 NOTE — ED PROVIDER NOTE - PROGRESS NOTE DETAILS
Pt reports feeling improved. Satting 100% on nc. Tachycardia improved to 130s after 1L. Reviewed all results with pt as well as plans for admisison. Pt is comfortable with plan for admission. Questions answered. - Doron Darnell, PGY-1

## 2020-06-26 PROBLEM — N18.9 CKD (CHRONIC KIDNEY DISEASE): Status: ACTIVE | Noted: 2020-01-01

## 2020-06-26 NOTE — DISCHARGE NOTE PROVIDER - CARE PROVIDER_API CALL
Ana Santiago  MEDICAL ONCOLOGY  [address] [place] [address]  Phone: [phone]  Fax: [phone]  Follow Up Time: 1 week

## 2020-06-26 NOTE — PROGRESS NOTE ADULT - SUBJECTIVE AND OBJECTIVE BOX
[facility]  INFECTIOUS DISEASES AND INTERNAL MEDICINE at [place]  =======================================================  Guido Farooq MD  Diplomates American Board of Internal Medicine and Infectious Diseases  =======================================================    [id number]  EDITH VALENCIA     CC: Fever, Cough     HPI:  60 y/o man with PMH of DM, HTN and esophageal ca diagnosed about 3 months ago s/p stent placement and on chemo(last chemo 6/18), was admitted with cough, fever and hypoxia.   On admission day he woke up with severe episodes of cough at 3am and later 5am. His mother who lives with him found him at 5am while coughing and feeling short of breath so called EMS. So2 was 88% at that time and he was given O2 through NRB with rapid improvement in SO2. CXR showed LLL opacity.    ID has been called for pneumonia management.     PAST MEDICAL & SURGICAL HISTORY:  HTN (hypertension)  HLD (hyperlipidemia)  History of gastroesophageal reflux (GERD)  Esophageal cancer  Diabetes  No significant past surgical history    Social Hx: No smoking, ETOH or drugs     FAMILY HISTORY:  No pertinent family history in first degree relatives    Allergies  Allergy Status Unknown    Antibiotics:  Vancomycin and cefepime     REVIEW OF SYSTEMS:  CONSTITUTIONAL:  No Fever or chills  HEENT:  No diplopia or blurred vision.  No sore throat or runny nose.  CARDIOVASCULAR:  No chest pain or SOB.  RESPIRATORY:  + cough, no shortness of breath currently   GASTROINTESTINAL:  No nausea, vomiting or diarrhea.  GENITOURINARY:  No dysuria, frequency or urgency. No Blood in urine  MUSCULOSKELETAL:  no joint aches, no muscle pain  SKIN:  No change in skin, hair or nails.  NEUROLOGIC:  No paresthesias, fasciculations, seizures or weakness.  PSYCHIATRIC:  No disorder of thought or mood.  ENDOCRINE:  No heat or cold intolerance, polyuria or polydipsia.  HEMATOLOGICAL:  No easy bruising or bleeding.     Physical Exam:  Vital Signs Last 24 Hrs  T(C): 37.1 (25 Jun 2020 12:39), Max: 39.5 (25 Jun 2020 07:50)  T(F): 98.8 (25 Jun 2020 12:39), Max: 103.1 (25 Jun 2020 07:50)  HR: 113 (25 Jun 2020 12:39) (113 - 140)  BP: 119/55 (25 Jun 2020 12:39) (84/46 - 132/59)  RR: 20 (25 Jun 2020 12:39) (19 - 20)  SpO2: 100% (25 Jun 2020 12:39) (100% - 100%)  Height (cm): 165.1 (06-25 @ 07:50)  Weight (kg): 64.9 (06-25 @ 07:50)  BMI (kg/m2): 23.8 (06-25 @ 07:50)  BSA (m2): 1.72 (06-25 @ 07:50)  GEN: NAD  HEENT: normocephalic and atraumatic. EOMI. PERRL.    NECK: Supple.  No lymphadenopathy   LUNGS: coarse crackles bilaterally in bases L>R  HEART: Regular rate and rhythm   ABDOMEN: Soft, nontender, and nondistended.  Positive bowel sounds.    : No CVA tenderness  EXTREMITIES: Without any cyanosis, clubbing, rash, lesions or edema.  NEUROLOGIC: grossly intact.  PSYCHIATRIC: Appropriate affect .  SKIN: No ulceration or induration present.    Labs:  06-25    133<L>  |  95<L>  |  33.0<H>  ----------------------------<  232<H>  5.2   |  22.0  |  1.70<H>    Ca    9.5      25 Jun 2020 08:19    TPro  7.4  /  Alb  3.8  /  TBili  0.3<L>  /  DBili  x   /  AST  22  /  ALT  23  /  AlkPhos  91  06-25                        9.1    6.21  )-----------( 325      ( 25 Jun 2020 08:19 )             27.7     PT/INR - ( 25 Jun 2020 08:19 )   PT: 10.9 sec;   INR: 0.97 ratio    PTT - ( 25 Jun 2020 08:19 )  PTT:22.4 sec    LIVER FUNCTIONS - ( 25 Jun 2020 08:19 )  Alb: 3.8 g/dL / Pro: 7.4 g/dL / ALK PHOS: 91 U/L / ALT: 23 U/L / AST: 22 U/L / GGT: x           All imaging and other data have been reviewed.  < from: Xray Chest 1 View-PORTABLE IMMEDIATE (06.25.20 @ 08:08) >   EXAM:  XR CHEST PORTABLE IMMED 1V                        PROCEDURE DATE:  06/25/2020    INTERPRETATION:  EXAM:XR CHEST IMMEDIATE.   CLINICAL INDICATION: Sepsis .   TECHNIQUE: Portable AP view.   PRIOR EXAM: CT dated 05/26/2020.   FINDINGS:    There is evidence of a left lower lobe patchy infiltrate, compatible with pneumonia. No sizable pleural effusion. Unremarkable cardiac and mediastinal silhouette. No significant osseous abnormality.  IMPRESSION:   Left lower lobe pneumonia.      Assessment and Plan:   60 y/o man with PMH of DM, HTN and esophageal ca diagnosed about 3 months ago s/p stent placement and on chemo(last chemo 6/18), was admitted with cough, fever and hypoxia.   On admission day he woke up with severe episodes of cough at 3am and later 5am. His mother who lives with him found him at 5am while coughing and feeling short of breath so called EMS. So2 was 88% at that time and he was given O2 through NRB with rapid improvement in SO2. CXR showed LLL opacity.    Due to presence of stent and esophageal cancer while lying down, could have had aspiration, since the day prior to admission was perfectly fine with no fever or any other symptoms.     Pneumonia possibly Aspiration   Esophageal cancer on chemo  DM    - Blood culture x2  - Sputum culture if has sputum  - CXR with LLL opacity  - Will start zosyn 3.375gm q8h to cover for HCAP and aspiration both.   - Will hold on vancomycin for now  - No neutropenia, will watch WBC  - Will send PCT, Creat 1.7  - Trend Tmax    Will follow.    Case discussed with Dr. Griffith.
[facility]  INFECTIOUS DISEASES AND INTERNAL MEDICINE at [place]  =======================================================  Guido Farooq MD  Diplomates American Board of Internal Medicine and Infectious Diseases  =======================================================    [facility]-[id number]  EDITH VALENCIA     Follow up: pneumonia    Feels better, NAD not on any supplemental O2.  No fever. No more cough.     PAST MEDICAL & SURGICAL HISTORY:  HTN (hypertension)  HLD (hyperlipidemia)  History of gastroesophageal reflux (GERD)  Esophageal cancer  Diabetes  No significant past surgical history    Social Hx: No smoking, ETOH or drugs     FAMILY HISTORY:  No pertinent family history in first degree relatives    Allergies  Allergy Status Unknown    Antibiotics:  Vancomycin and cefepime     REVIEW OF SYSTEMS:  CONSTITUTIONAL:  No Fever or chills  HEENT:  No diplopia or blurred vision.  No sore throat or runny nose.  CARDIOVASCULAR:  No chest pain or SOB.  RESPIRATORY:  + cough, no shortness of breath currently   GASTROINTESTINAL:  No nausea, vomiting or diarrhea.  GENITOURINARY:  No dysuria, frequency or urgency. No Blood in urine  MUSCULOSKELETAL:  no joint aches, no muscle pain  SKIN:  No change in skin, hair or nails.  NEUROLOGIC:  No paresthesias, fasciculations, seizures or weakness.  PSYCHIATRIC:  No disorder of thought or mood.  ENDOCRINE:  No heat or cold intolerance, polyuria or polydipsia.  HEMATOLOGICAL:  No easy bruising or bleeding.     Physical Exam:  Vital Signs Last 24 Hrs  T(C): 36.7 (26 Jun 2020 08:00), Max: 37.1 (25 Jun 2020 12:39)  T(F): 98 (26 Jun 2020 08:00), Max: 98.8 (25 Jun 2020 12:39)  HR: 102 (26 Jun 2020 08:00) (93 - 117)  BP: 99/63 (26 Jun 2020 08:00) (94/51 - 132/59)  BP(mean): 75 (26 Jun 2020 08:00) (70 - 75)  RR: 17 (26 Jun 2020 08:00) (17 - 20)  SpO2: 99% (26 Jun 2020 08:00) (96% - 100%)  GEN: NAD  HEENT: normocephalic and atraumatic. EOMI. PERRL.    NECK: Supple.  No lymphadenopathy   LUNGS: coarse crackles bilaterally in bases L>R  HEART: Regular rate and rhythm   ABDOMEN: Soft, nontender, and nondistended.  Positive bowel sounds.    : No CVA tenderness  EXTREMITIES: Without any cyanosis, clubbing, rash, lesions or edema.  NEUROLOGIC: grossly intact.  PSYCHIATRIC: Appropriate affect .  SKIN: No ulceration or induration present.    Labs:   06-26    138  |  107  |  18.0  ----------------------------<  109<H>  4.5   |  21.0<L>  |  1.45<H>    Ca    8.5<L>      26 Jun 2020 05:56  Mg     1.5     06-26    TPro  7.4  /  Alb  3.8  /  TBili  0.3<L>  /  DBili  x   /  AST  22  /  ALT  23  /  AlkPhos  91  06-25                        7.3    7.53  )-----------( 236      ( 26 Jun 2020 05:56 )             23.0     PT/INR - ( 25 Jun 2020 08:19 )   PT: 10.9 sec;   INR: 0.97 ratio    PTT - ( 25 Jun 2020 08:19 )  PTT:22.4 sec    LIVER FUNCTIONS - ( 25 Jun 2020 08:19 )  Alb: 3.8 g/dL / Pro: 7.4 g/dL / ALK PHOS: 91 U/L / ALT: 23 U/L / AST: 22 U/L / GGT: x           All imaging and other data have been reviewed.  < from: Xray Chest 1 View-PORTABLE IMMEDIATE (06.25.20 @ 08:08) >   EXAM:  XR CHEST PORTABLE IMMED 1V                        PROCEDURE DATE:  06/25/2020    INTERPRETATION:  EXAM:XR CHEST IMMEDIATE.   CLINICAL INDICATION: Sepsis .   TECHNIQUE: Portable AP view.   PRIOR EXAM: CT dated 05/26/2020.   FINDINGS:    There is evidence of a left lower lobe patchy infiltrate, compatible with pneumonia. No sizable pleural effusion. Unremarkable cardiac and mediastinal silhouette. No significant osseous abnormality.  IMPRESSION:   Left lower lobe pneumonia.      Assessment and Plan:   60 y/o man with PMH of DM, HTN and esophageal ca diagnosed about 3 months ago s/p stent placement and on chemo(last chemo 6/18), was admitted with cough, fever and hypoxia.   On admission day he woke up with severe episodes of cough at 3am and later 5am. His mother who lives with him found him at 5am while coughing and feeling short of breath so called EMS. So2 was 88% at that time and he was given O2 through NRB with rapid improvement in SO2. CXR showed LLL opacity.    Due to presence of stent and esophageal cancer while lying down, could have had aspiration, since the day prior to admission was perfectly fine with no fever or any other symptoms.     Pneumonia possibly Aspiration   Esophageal cancer on chemo  DM    - Blood culture NGTD  - CXR with LLL opacity  - Can switch to oral Augmentin 875mg q12 to complete 7 days.  - No neutropenia  - PCT=10.48, Creat 1.45 improved   - Needs 14 days quarantine at home since his roommate in the hospital has been diagnosed with COVID19.   - If any fever or worsening of symptoms will come back to ED.     Will follow PRN, call with any question.  Discussed with Dr. Garcia.
CC: Fever, Cough (26 Jun 2020 08:57)    INTERVAL HPI/OVERNIGHT EVENTS:  no acute events overnight  patient without complaints  tolerating diet - patient has specific diet he should be on however    Vital Signs Last 24 Hrs  T(C): 36.7 (26 Jun 2020 08:00), Max: 37.1 (25 Jun 2020 12:39)  T(F): 98 (26 Jun 2020 08:00), Max: 98.8 (25 Jun 2020 12:39)  HR: 102 (26 Jun 2020 08:00) (93 - 120)  BP: 99/63 (26 Jun 2020 08:00) (84/46 - 132/59)  BP(mean): 75 (26 Jun 2020 08:00) (70 - 75)  RR: 17 (26 Jun 2020 08:00) (17 - 20)  SpO2: 99% (26 Jun 2020 08:00) (96% - 100%)    PHYSICAL EXAM:  General: Well developed; well nourished; in no acute distress  Eyes: PERRLA, EOMI; conjunctiva and sclera clear  Head: Normocephalic; atraumatic  ENMT: No nasal discharge; airway clear  Neck: Supple; non tender; no masses  Respiratory: No wheezes, rales or rhonchi  Cardiovascular: Regular rate and rhythm. S1 and S2 Normal; No murmurs, gallops or rubs  Gastrointestinal: Soft non-tender non-distended; Normal bowel sounds  Genitourinary: No costovertebral angle tenderness  Extremities: Normal range of motion, No clubbing, cyanosis or edema  Vascular: Peripheral pulses palpable 2+ bilaterally  Neurological: Alert and oriented x4  Skin: Warm and dry. No acute rash  Musculoskeletal: Normal gait, tone, without deformities  Psychiatric: Cooperative and appropriate    I&O's Detail    25 Jun 2020 07:01  -  26 Jun 2020 07:00  --------------------------------------------------------  IN:    Oral Fluid: 420 mL    sodium chloride 0.9%.: 1500 mL    Solution: 200 mL  Total IN: 2120 mL    OUT:    Voided: 350 mL  Total OUT: 350 mL    Total NET: 1770 mL    26 Jun 2020 07:01  -  26 Jun 2020 09:56  --------------------------------------------------------  IN:    sodium chloride 0.9%.: 250 mL  Total IN: 250 mL    OUT:  Total OUT: 0 mL    Total NET: 250 mL               7.3    7.53  )-----------( 236      ( 26 Jun 2020 05:56 )             23.0     26 Jun 2020 05:56    138    |  107    |  18.0   ----------------------------<  109    4.5     |  21.0   |  1.45     Ca    8.5        26 Jun 2020 05:56  Mg     1.5       26 Jun 2020 05:56    TPro  7.4    /  Alb  3.8    /  TBili  0.3    /  DBili  x      /  AST  22     /  ALT  23     /  AlkPhos  91     25 Jun 2020 08:19    PT/INR - ( 25 Jun 2020 08:19 )   PT: 10.9 sec;   INR: 0.97 ratio      PTT - ( 25 Jun 2020 08:19 )  PTT:22.4 sec    CAPILLARY BLOOD GLUCOSE  POCT Blood Glucose.: 98 mg/dL (26 Jun 2020 07:37)  POCT Blood Glucose.: 127 mg/dL (25 Jun 2020 17:10)  POCT Blood Glucose.: 187 mg/dL (25 Jun 2020 12:32)    LIVER FUNCTIONS - ( 25 Jun 2020 08:19 )  Alb: 3.8 g/dL / Pro: 7.4 g/dL / ALK PHOS: 91 U/L / ALT: 23 U/L / AST: 22 U/L / GGT: x           MEDICATIONS  (STANDING):  atorvastatin 10 milliGRAM(s) Oral at bedtime  dextrose 5%. 1000 milliLiter(s) (50 mL/Hr) IV Continuous <Continuous>  dextrose 50% Injectable 12.5 Gram(s) IV Push once  dextrose 50% Injectable 25 Gram(s) IV Push once  dextrose 50% Injectable 25 Gram(s) IV Push once  enoxaparin Injectable 30 milliGRAM(s) SubCutaneous daily  insulin lispro (HumaLOG) corrective regimen sliding scale   SubCutaneous three times a day before meals  pantoprazole    Tablet 40 milliGRAM(s) Oral before breakfast  piperacillin/tazobactam IVPB.. 3.375 Gram(s) IV Intermittent every 8 hours  sodium chloride 0.9%. 1000 milliLiter(s) (125 mL/Hr) IV Continuous <Continuous>    MEDICATIONS  (PRN):  acetaminophen   Tablet .. 650 milliGRAM(s) Oral every 6 hours PRN Temp greater or equal to 38C (100.4F), Mild Pain (1 - 3)  dextrose 40% Gel 15 Gram(s) Oral once PRN Blood Glucose LESS THAN 70 milliGRAM(s)/deciliter  glucagon  Injectable 1 milliGRAM(s) IntraMuscular once PRN Glucose LESS THAN 70 milligrams/deciliter  ondansetron   Disintegrating Tablet 8 milliGRAM(s) Oral every 8 hours PRN for nausea  traMADol 50 milliGRAM(s) Oral every 6 hours PRN Severe Pain (7 - 10)    RADIOLOGY & ADDITIONAL TESTS:

## 2020-06-26 NOTE — DISCHARGE NOTE PROVIDER - NSDCFUSCHEDAPPT_GEN_ALL_CORE_FT
EDITH VALENCIA ; 06/30/2020 ; [facility]  EDITH VALENCIA ; 07/09/2020 ; [facility]  EDITH VALENCIA ; 07/09/2020 ; [facility]  EDITH VALENCIA ; 07/16/2020 ; [facility]  EDITH VALENCIA ; 07/16/2020 ; [facility]

## 2020-06-26 NOTE — DISCHARGE NOTE PROVIDER - NSDCMRMEDTOKEN_GEN_ALL_CORE_FT
atorvastatin 10 mg oral tablet: 1 tab(s) orally once a day  Augmentin 875 mg-125 mg oral tablet: 1 tab(s) orally 2 times a day   lisinopril 2.5 mg oral tablet: 1 tab(s) orally once a day  metFORMIN 1000 mg oral tablet: 1 tab(s) orally 2 times a day  ondansetron 8 mg oral tablet, disintegratin tab(s) orally every 8 hours, As Needed - for nausea  pantoprazole 40 mg oral delayed release tablet: 1 tab(s) orally once a day  prochlorperazine 10 mg oral tablet: 1 tab(s) orally every 6 hours, As Needed - for nausea  sucralfate 1 g/10 mL oral suspension: 10 milliliter(s) orally 4 times a day  traMADol 50 mg oral tablet: 1 tab(s) orally every 4-6 hours, As Needed for pain

## 2020-06-26 NOTE — PROGRESS NOTE ADULT - ASSESSMENT
58 y/o M pt with hx of DM, HTN, and esophageal ca (esophageal stent placed 5/28/2020) currently on chemo tx weekly (last chemo 6/19) presenting 6/25 with fever , cough and hypoxia. He woke up morning of admission and had a coughing episode. Pt went to sleep, then woke up several hours later with another coughing episode with fevers/chills. Per EMS when they arrived, pt was satting 88% on ra with improvement to 100% on NRB. Patient admitted for HCAP    #Sepsis 2/2 gram positive and gram negative pneumonia of the left lower lobe  - started on vanco and cefepime, will continue  - blood cx, sputum cx and urine legionella pending  - O2 and pulse ox  - ID consulted    #DM type 2  - hold oral meds  - CSI and fingerstick monitoring    #HTN  - hold BP meds for now   - monitor    #HLD  - on statin     #Esophageal cancer s/p stent on chemo  - continue PPI  - outpt follow up with heme/onc    #Prophylactic measure  - DVT ppx: Lovenox SQ 58 y/o M pt with hx of DM, HTN, and esophageal ca (esophageal stent placed 5/28/2020) currently on chemo tx weekly (last chemo 6/19) presenting 6/25 with fever , cough and hypoxia. He woke up morning of admission and had a coughing episode. Pt went to sleep, then woke up several hours later with another coughing episode with fevers/chills. Per EMS when they arrived, pt was satting 88% on ra with improvement to 100% on NRB. Patient admitted for HCAP    #Sepsis 2/2 gram positive and gram negative pneumonia of the left lower lobe  - started on vanco and cefepime, will continue zosyn  - vanco held per ID  - blood cx, sputum cx and urine legionella pending  - O2 and pulse ox  - ID consulted - recs appreciated  - blood cultures pending  - possibly discharge home if patient without fever and cultures negative by tomorrow    #DM type 2  - hold oral meds  - CSI and fingerstick monitoring    #HTN  - hold BP meds for now   - monitor    #HLD  - on statin     #Esophageal cancer s/p stent on chemo  - continue PPI  - outpt follow up with heme/onc  - patient on low fiber diet due to stent  - carafate QID    #Prophylactic measure  - DVT ppx: Lovenox SQ

## 2020-06-26 NOTE — DISCHARGE NOTE PROVIDER - HOSPITAL COURSE
58 y/o M pt with hx of DM, HTN, and esophageal ca (esophageal stent placed 5/28/2020) currently on chemo tx weekly (last chemo 6/19) presenting 6/25 with fever , cough and hypoxia. He woke up morning of admission and had a coughing episode. Pt went to sleep, then woke up several hours later with another coughing episode with fevers/chills. Per EMS when they arrived, pt was satting 88% on ra with improvement to 100% on NRB. Patient admitted for HCAP        Patient is off oxygen. Seen in consultation with ID. They recommended zosyn alone. Patient is symptomatically improved. He is off oxygen and wthout fever. He tested COVID negative on admission. Discussed discharge with patient and family and they are in agreement. Dr. Forrest recommends Augmentin on discharge.        discharge time 45 minutes

## 2020-06-26 NOTE — DISCHARGE NOTE PROVIDER - NSDCCPCAREPLAN_GEN_ALL_CORE_FT
PRINCIPAL DISCHARGE DIAGNOSIS  Diagnosis: HCAP (healthcare-associated pneumonia)  Assessment and Plan of Treatment: continue with augmentin for      SECONDARY DISCHARGE DIAGNOSES  Diagnosis: Sepsis  Assessment and Plan of Treatment: 2/2 left lobe pneumonia  continue augmentin as above    Diagnosis: Esophageal cancer  Assessment and Plan of Treatment: complete course of antibiotic before resuming chemotherapy  stent without issues  will continue with soft low fiber/low residue diet  continue carafate QID

## 2020-06-26 NOTE — DISCHARGE NOTE NURSING/CASE MANAGEMENT/SOCIAL WORK - PATIENT PORTAL LINK FT
You can access the FollowMyHealth Patient Portal offered by Health system by registering at the following website: [email or web address]. By joining [facility]’s FollowMyHealth portal, you will also be able to view your health information using other applications (apps) compatible with our system.

## 2020-06-29 PROBLEM — I10 ESSENTIAL (PRIMARY) HYPERTENSION: Chronic | Status: ACTIVE | Noted: 2020-01-01

## 2020-06-29 PROBLEM — Z87.19 PERSONAL HISTORY OF OTHER DISEASES OF THE DIGESTIVE SYSTEM: Chronic | Status: ACTIVE | Noted: 2020-01-01

## 2020-06-29 PROBLEM — R93.3 ABNORMAL FINDINGS ON DIAGNOSTIC IMAGING OF DIGESTIVE SYSTEM: Status: ACTIVE | Noted: 2020-01-01

## 2020-06-29 PROBLEM — E78.5 HYPERLIPIDEMIA, UNSPECIFIED: Chronic | Status: ACTIVE | Noted: 2020-01-01

## 2020-06-30 NOTE — HISTORY OF PRESENT ILLNESS
[de-identified] : Esophageal adenocarcinoma, HER-2 positive [de-identified] : The patient was diagnosed with adenocarcinoma of the esophagus in May 2020 at the age of 59.  He initially saw GI, Dr. Jacek Adhikari, in March 2019 due to anemia.  A colonoscopy was done in July 2019 and an EGD was discussed but not done until 5/20/20 when he was f/w an esophageal mass.  Pathology c/w adenocarcinoma.  Staging CT showed  marked thickening of GE junction without definable tumor. There is evidence of small gastrohepatic ligament lymph nodes measuring up to 1.2 cm in size.  New radiographic finding of a mid small bowel lesion with post symmetrical and asymmetrical thickening of the bowel wall without significant surrounding inflammatory process or definable intussusception. Given the focal nature this is more suspicious for small bowel tumor as opposed to an inflammatory process. [de-identified] : Past medical history of DM, HTN, and CKD\par SH: Non - smoker and no ETOH.  No environmental exposures.  No hx of reflux.  There is a hx of second hand smoke.  Pt lives with his mother.   Pt works as a  but now on unemployment.   [de-identified] : Pt presents for follow up visit during C3D1 induction chemotherapy for dysphagia with carbo /taxol.  Due to inability to tolerate solids and only puréed diet with weight loss, GI placed esophageal stent 5/28/20 and he can now tolerate solids that are soft.   He reports a 20 -lb weight loss over 8 weeks.  Screening colonoscopy 7/19/20. Today, denies diarrhea, back pain, sob, cough, n/v/d or any other sx.

## 2020-06-30 NOTE — RESULTS/DATA
[FreeTextEntry1] : 6/24/20 PET/CT: \par 1.  FDG avid soft tissue thickening distal esophagus/GE junction consistent with known malignancy. Prominent FDG avidity in the gastric fundal portion of the esophageal stent, possibly due to inflammation related to placement. Clinical correlation suggested.\par 2.  FDG avid right retrocaval/retroperitoneal lymph node at the level of the renal vein, suspicious for metastatic disease, unchanged in size since 5/6/2020 and 5/26/2020. Small gastrohepatic lymph nodes are possibly metastatic, but too small to characterize.\par 3.  Widespread small and large bowel FDG avidity due to metformin treatment and incomplete opacification of bowel loops by oral contrast limits evaluation for detection of small bowel lesion seen on CT abdomen/pelvis May 20, 2020. Lesion remains indeterminate and cannot be further characterized by FDG PET/CT. Consider further evaluation with small bowel enteroscopy or capsule endoscopy.\par \par 5/26/20 CT A/P:\par When compared to previous recent examination there is a new radiographic finding of a mid small bowel lesion with post symmetrical and asymmetrical thickening of the bowel wall without significant surrounding inflammatory process or definable intussusception. Given the focal nature this is more suspicious for small bowel tumor as opposed to an inflammatory process. In a patient with recently diagnosed esophageal cancer marked thickening GE junction is seen without definable tumor. There is evidence of small gastrohepatic ligament lymph nodes measuring up to 1.2 cm in size.\par \par 5/20/20 Pathology: \par Distal esophagus "mass" biopsy --Esophagus, distal mass, biopsy \par - Fragments of moderately-differentiated adenocarcinoma.\par - Tumor undermines squamous mucosa.\par \par 7/8/19 pathology: Colon polyp x 2,  rectum, biopsy\par - Sessile serrated Adenoma x 2 fragments\par - No high grade lesion identified

## 2020-07-09 PROBLEM — Z00.00 ENCOUNTER FOR PREVENTIVE HEALTH EXAMINATION: Status: ACTIVE | Noted: 2019-02-19

## 2020-07-17 NOTE — HISTORY OF PRESENT ILLNESS
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  He initially saw GI, Dr. Jacek Adhikari, in March 2019 due to anemia.  A colonoscopy was done in July 2019 and an EGD was discussed but not done until 5/20/20 when he was f/w an esophageal mass.  Pathology c/w adenocarcinoma.  Staging CT showed  marked thickening of GE junction without definable tumor. There is evidence of small gastrohepatic ligament lymph nodes measuring up to 1.2 cm in size.  New radiographic finding of a mid small bowel lesion with post symmetrical and asymmetrical thickening of the bowel wall without significant surrounding inflammatory process or definable intussusception. Given the focal nature this is more suspicious for small bowel tumor as opposed to an inflammatory process. [de-identified] : Esophageal adenocarcinoma, HER-2 positive [de-identified] : Past medical history of DM, HTN, and CKD\par SH: Non - smoker and no ETOH.  No environmental exposures.  No hx of reflux.  There is a hx of second hand smoke.  Pt lives with his mother.   Pt works as a  but now on unemployment.   [de-identified] : Pt presents for follow up visit during C3D1 induction chemotherapy for dysphagia with carbo /taxol.  Due to inability to tolerate solids and only puréed diet with weight loss, GI placed esophageal stent 5/28/20 and he can now tolerate solids that are soft.   He reports a 20 -lb weight loss over 8 weeks.  Screening colonoscopy 7/19/20. Today, denies diarrhea, back pain, sob, cough, n/v/d or any other sx.

## 2020-07-28 NOTE — ASU PATIENT PROFILE, ADULT - LEARNING ASSESSMENT (PATIENT) ADDITIONAL COMMENTS
Pre op teaching surgical scrub pain management instructions given to pt Covid swab done today in PST

## 2020-07-28 NOTE — H&P PST ADULT - NSICDXPROBLEM_GEN_ALL_CORE_FT
PROBLEM DIAGNOSES  Problem: Malignant neoplasm of esophagus  Assessment and Plan: insertion of port with anesthesia and CT Guided retrocaval lymph node     Problem: Hypertension  Assessment and Plan: f/u with PCP    Problem: Need for prophylactic measure  Assessment and Plan: Moderate risk surgical team to determine prophylactic intervention

## 2020-07-28 NOTE — H&P PST ADULT - NSICDXFAMILYHX_GEN_ALL_CORE_FT
FAMILY HISTORY:  Father  Still living? No  FHx: throat cancer, Age at diagnosis: Age Unknown    Mother  Still living? Yes, Estimated age: Age Unknown  FHx: diabetes mellitus, Age at diagnosis: Age Unknown

## 2020-07-28 NOTE — H&P PST ADULT - HEMATOLOGY/LYMPHATICS
----- Message from Reynaldomarcos Garcia negative

## 2020-07-28 NOTE — H&P PST ADULT - NSANTHOSAYNRD_GEN_A_CORE
No. FELISA screening performed.  STOP BANG Legend: 0-2 = LOW Risk; 3-4 = INTERMEDIATE Risk; 5-8 = HIGH Risk

## 2020-07-28 NOTE — ASU PATIENT PROFILE, ADULT - PMH
Diabetes    Esophageal cancer    History of gastroesophageal reflux (GERD)    HLD (hyperlipidemia)    HTN (hypertension)

## 2020-07-28 NOTE — H&P PST ADULT - HISTORY OF PRESENT ILLNESS
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 Pt nonsmoker report extensive hx of second hand smoke and family hx of malignant neoplasm( Father). Pt report weight loss, denied difficulty tolerating liquid and solid food since he had his esophageal stent placement(5/28/2020). Seen today for a scheduled procedure with Dr. Santiago

## 2020-07-28 NOTE — H&P PST ADULT - LAB RESULTS AND INTERPRETATION
Pt lab reviewed, abnormal lab results noted, email sent to Dr. Santiago regarding lab results, copies faxed to PCP office today, phone called to PCP (7/28/2020@ 5:15pm),  unable to leave a message at this time. Will f/u in the morning regarding lab results. Pt lab reviewed, abnormal lab results noted, email sent to Dr. Santiago regarding lab results, copies faxed to PCP office today, phone called to PCP (7/28/2020@ 5:15pm),  unable to leave a message at this time. Will f/u in the morning regarding lab results.  7/29/2020. PCP office called today regarding lab results, spoke to Isidra.

## 2020-07-28 NOTE — H&P PST ADULT - NSICDXPASTMEDICALHX_GEN_ALL_CORE_FT
PAST MEDICAL HISTORY:  Diabetes     Esophageal cancer stent May 2020    History of gastroesophageal reflux (GERD)     HLD (hyperlipidemia)     HTN (hypertension) PAST MEDICAL HISTORY:  Diabetes     Esophageal cancer stent placement May 2020    History of gastroesophageal reflux (GERD)     HLD (hyperlipidemia)     HTN (hypertension)

## 2020-07-30 PROBLEM — C15.9 MALIGNANT NEOPLASM OF ESOPHAGUS, UNSPECIFIED: Chronic | Status: ACTIVE | Noted: 2020-01-01

## 2020-07-30 NOTE — CONSULT NOTE ADULT - SUBJECTIVE AND OBJECTIVE BOX
INFECTIOUS DISEASES AND INTERNAL MEDICINE at [place]  =======================================================  Guido Farooq MD  Diplomates American Board of Internal Medicine and Infectious Diseases  Telephone [phone]  Fax            [phone]  =======================================================    EDITH [id number]      HPI:   58 y/o male with HTN, CKD, HLD, GERD, adenocarcinomas of the esophagus(May,  2020, Chemotherapy)   AND WAS SCHEDULED FOR A PORT PLACEMENT AND BX OF LN.  PT WITH FEVERS AND WAS SENT TO ER FOR FURTHER MANAGEMENT  PT WITH RECENT HOSP STAY IN [place]UN[place] FOR ? PNEUMONIA   ASKED TO EVALUATE FROM ID STANDPOINT         PAST MEDICAL & SURGICAL HISTORY:  HTN (hypertension)  HLD (hyperlipidemia)  History of gastroesophageal reflux (GERD)  Esophageal cancer: stent placement May 2020  Diabetes  H/O colonoscopy  History of cancer chemotherapy      ANTIBIOTICS  cefepime   IVPB          Allergies    Allergy Status Unknown    Intolerances    requests tuna salad (Unknown)      SOCIAL HISTORY:     FAMILY HX   FAMILY HISTORY:  FHx: throat cancer  FHx: diabetes mellitus      Vital Signs Last 24 Hrs  T(C): 36.9 (30 Jul 2020 14:29), Max: 38.3 (30 Jul 2020 10:22)  T(F): 98.4 (30 Jul 2020 14:29), Max: 100.9 (30 Jul 2020 10:22)  HR: 104 (30 Jul 2020 14:29) (104 - 122)  BP: 97/58 (30 Jul 2020 14:29) (96/57 - 98/57)  BP(mean): --  RR: 18 (30 Jul 2020 14:29) (18 - 21)  SpO2: 98% (30 Jul 2020 14:29) (95% - 98%)  Drug Dosing Weight  Height (cm): 165.1 (30 Jul 2020 10:22)  Weight (kg): 63.5 (30 Jul 2020 10:22)  BMI (kg/m2): 23.3 (30 Jul 2020 10:22)  BSA (m2): 1.7 (30 Jul 2020 10:22)      REVIEW OF SYSTEMS:    CONSTITUTIONAL:  As per HPI.    HEENT:  Eyes:  No diplopia or blurred vision. ENT:  No earache, sore throat or runny nose.    CARDIOVASCULAR:  No pressure, squeezing, strangling, tightness, heaviness or aching about the chest, neck, axilla or epigastrium.    RESPIRATORY:  No cough, shortness of breath, PND or orthopnea.    GASTROINTESTINAL:  No nausea, vomiting or diarrhea.    GENITOURINARY:  No dysuria, frequency or urgency.    MUSCULOSKELETAL:  As per HPI.    SKIN:  No change in skin, hair or nails.    NEUROLOGIC:  No paresthesias, fasciculations, seizures or weakness.                  PHYSICAL EXAMINATION:    GENERAL: The patient is a well-developed, well-nourished _____in no apparent distress. ___ is alert and oriented x3.    VITAL SIGNS: T(C): 36.9 (07-30-20 @ 14:29), Max: 38.3 (07-30-20 @ 10:22)  HR: 104 (07-30-20 @ 14:29) (104 - 122)  BP: 97/58 (07-30-20 @ 14:29) (96/57 - 98/57)  RR: 18 (07-30-20 @ 14:29) (18 - 21)  SpO2: 98% (07-30-20 @ 14:29) (95% - 98%)  Wt(kg): --    HEENT: Head is normocephalic and atraumatic.  ANICTERIC  NECK: Supple. No carotid bruits.  No lymphadenopathy or thyromegaly.    LUNGS:COARSE BREATH SOUNDS    HEART: Regular rate and rhythm without murmur.    ABDOMEN: Soft, nontender, and nondistended.  Positive bowel sounds.  No hepatosplenomegaly was noted. NO REBOUND NO GUARDING    EXTREMITIES: NO EDEMA NO ERYTHEMA    NEUROLOGIC: NON FOCAL      SKIN: No ulceration or induration present. NO RASH        BLOOD CULTURES       URINE CX          LABS:                        7.4    11.44 )-----------( 357      ( 30 Jul 2020 10:51 )             23.2     07-30    132<L>  |  96<L>  |  40.0<H>  ----------------------------<  204<H>  4.7   |  25.0  |  1.60<H>    Ca    8.8      30 Jul 2020 10:51    TPro  7.1  /  Alb  3.4  /  TBili  0.2<L>  /  DBili  x   /  AST  13  /  ALT  13  /  AlkPhos  83  07-30    PT/INR - ( 30 Jul 2020 10:51 )   PT: 13.6 sec;   INR: 1.18 ratio         PTT - ( 30 Jul 2020 10:51 )  PTT:25.3 sec      RADIOLOGY & ADDITIONAL STUDIES:      ASSESSMENT/PLAN    58 y/o male with HTN, CKD, HLD, GERD, adenocarcinomas of the esophagus(May,  2020, Chemotherapy)   AND WAS SCHEDULED FOR A PORT PLACEMENT AND BX OF LN.  PT WITH FEVERS AND WAS SENT TO ER FOR FURTHER MANAGEMENT  PT WITH RECENT HOSP STAY IN [place] FOR ? PNEUMONIA    PT WITH UNDERLYING MALIGNANCY  WOULD OBTAIN BLOOD CX X2 SETS    AGREE WITH EMPIRIC ABX   CONSIDER FURTHER  IMAGING IF FEVERS CONTINUE              EVANGELINA RUTH MD

## 2020-07-30 NOTE — H&P ADULT - NSHPPHYSICALEXAM_GEN_ALL_CORE
PHYSICAL EXAM:    GENERAL: NAD, well-groomed,  HEAD:  Atraumatic, Normocephalic  EYES: EOMI, PERRLA, conjunctiva and sclera clear  ENMT: No tonsillar erythema, exudates, or enlargement; Moist mucous membranes, Good dentition, No lesions  NECK: Supple, No JVD, Normal thyroid  NERVOUS SYSTEM:  Alert & Oriented X3, Good concentration; Motor Strength 5/5 B/L upper and lower extremities;   CHEST/LUNG: diminsihed  HEART: Regular rate and rhythm; No murmurs, rubs, or gallops  ABDOMEN: Soft, Nontender, Nondistended; Bowel sounds present  EXTREMITIES:  2+ Peripheral Pulses, No clubbing, cyanosis, or edema  LYMPH: No lymphadenopathy noted  SKIN: No rashes or lesions

## 2020-07-30 NOTE — ED ADULT NURSE REASSESSMENT NOTE - NS ED NURSE REASSESS COMMENT FT1
Pt resting comfortably in stretcher, resp even and unlabored, tolerating meal tray without complication, pt aware of plan of care, offers no complaints at this time, awaiting room assignment, will continue to monitor.

## 2020-07-30 NOTE — ED ADULT TRIAGE NOTE - CHIEF COMPLAINT QUOTE
patient c/o fever after patient was suppose to have a chemo port placed and was found to have a fever and tachy cardia. patient denies pain, hx of esophageal cancer. Code sepsis called based on criteria

## 2020-07-30 NOTE — ED ADULT TRIAGE NOTE - DOMESTIC TRAVEL HIGH RISK QUESTION
"[facility]  Psychiatric Progress Note      Impression:   Patient lying awake in her bed.  She makes good eye contact, reports she feels better right now and tells me about a few days ago when she did not feel like eating very much and her blood sugar dropped down.  Since then, she is back on track and feels like everything is working better.  She is happy taking thorazine scheduled now and clozaril has been discontinued.  Staff report some increased irritability past few days, this morning however she appears to be in better spirits.  She denies si/hi and denies any other concerns.  Patient is touching her fingers together on one hand, identifies this to be ongoing \"OCD\" and keeps her mind busy when she does this.  We discuss possible TD movements from medication, patient denies and reports she has been doing this for many many years - monitor for worsening, she does have cogentin on board.         Diagnoses:      Schizoaffective disorder, bipolar type   Methamphetamine use disorder, moderate to severe            Plan:   Continue current medications    Hospitalist following diabetes - continue foot soaks    [place] is going forward with petition for commitment    ELOS:  > 5 days due to symptoms of psychosis and delusions along with Civil Commitment process      Attestation:  Patient has been seen and evaluated by me,  LATRICE Du CNP          Interim History:   The patient's care was discussed with the treatment team and chart notes were reviewed.          Medications:     Current Facility-Administered Medications   Medication     acetaminophen (TYLENOL) tablet 975 mg     atorvastatin (LIPITOR) tablet 20 mg     benzocaine-menthol (CEPACOL) 15-3.6 MG lozenge 1 lozenge     benztropine (COGENTIN) tablet 0.5 mg     chlorproMAZINE (THORAZINE) tablet 100 mg     glucose gel 15-30 g    Or     dextrose 50 % injection 25-50 mL    Or     glucagon injection 1 mg     gabapentin (NEURONTIN) capsule " "1,000 mg     hydrOXYzine (ATARAX) tablet 100 mg     ibuprofen (ADVIL/MOTRIN) tablet 400 mg     insulin aspart (NovoLOG) inj (RAPID ACTING)     insulin aspart (NovoLOG) inj (RAPID ACTING)     insulin glargine (LANTUS PEN) injection 80 Units     magnesium hydroxide (MILK OF MAGNESIA) suspension 30 mL     magnesium sulfate (EPSOM SALT) granules 5 g     metFORMIN (GLUCOPHAGE) tablet 1,000 mg     nicotine (NICODERM CQ) 21 MG/24HR 24 hr patch 1 patch     nicotine (NICORETTE) gum 2-4 mg     nicotine Patch in Place     nicotine patch REMOVAL     prazosin (MINIPRESS) capsule 1 mg     senna-docusate (SENOKOT-S/PERICOLACE) 8.6-50 MG per tablet 1 tablet     topiramate (TOPAMAX) tablet 100 mg          10 point ROS negative \"pain everywhere\"       Allergies:     Allergies   Allergen Reactions     Haloperidol Other (See Comments)     Other reaction(s): Seizures  Patient states, \"I fell down and wasn't able to get up.\"  Patient states, \"I fell down and wasn't able to get up.\"              Psychiatric Examination:   /77   Pulse 92   Temp 98.6  F (37  C) (Tympanic)   Resp 16   Ht 1.626 m (5' 4\")   Wt 86.5 kg (190 lb 9.6 oz)   SpO2 99%   BMI 32.72 kg/m    Weight is 190 lbs 9.6 oz  Body mass index is 32.72 kg/m .    Appearance:  awake, alert   Attitude:  cooperative  Eye Contact:  fair  Mood:  irritable  Affect: mood congruent  Speech:  pressured speech  Psychomotor Behavior:  no evidence of tardive dyskinesia, dystonia, or tics  Thought Process: somewhat disorganized  Associations:  loosening of associations present   Thought Content:  Denies SI and Hi. Reports a decrease in AH  Insight:  limited  Judgment:  poor  Oriented to:  time, person, and place  Attention Span and Concentration:  limited  Recent and Remote Memory:  fair  Fund of Knowledge: low-normal  Muscle Strength and Tone: normal  Gait and Station: Normal         Labs:     Results for orders placed or performed during the hospital encounter of 05/10/19 (from " the past 24 hour(s))   Glucose by meter   Result Value Ref Range    Glucose 43 (LL) 70 - 99 mg/dL   Glucose by meter   Result Value Ref Range    Glucose 113 (H) 70 - 99 mg/dL   Glucose by meter   Result Value Ref Range    Glucose 227 (H) 70 - 99 mg/dL   Glucose by meter   Result Value Ref Range    Glucose 319 (H) 70 - 99 mg/dL   Glucose by meter   Result Value Ref Range    Glucose 219 (H) 70 - 99 mg/dL   Glucose by meter   Result Value Ref Range    Glucose 125 (H) 70 - 99 mg/dL        No

## 2020-07-30 NOTE — H&P ADULT - NSICDXPASTMEDICALHX_GEN_ALL_CORE_FT
PAST MEDICAL HISTORY:  Diabetes     Esophageal cancer stent placement May 2020    History of gastroesophageal reflux (GERD)     HLD (hyperlipidemia)     HTN (hypertension)

## 2020-07-30 NOTE — ED ADULT NURSE NOTE - OBJECTIVE STATEMENT
Pt comes to ED s/p going for a biopsy and port placement this morning, upon taking vital signs noted to be febrile and tachycardic, pt underwent chemo x3 for esophageal cancer, AOx3, resp even and unlabored, denies pain, denies n/v, speaking in full sentences w/out complications, hx of HTN, DM and HLD

## 2020-07-30 NOTE — ED PROVIDER NOTE - OBJECTIVE STATEMENT
58 y/o went to IR for port and biospy , h/o esophageal ca no with new mass   needs chemo port   upon arrival pt febrile and

## 2020-07-30 NOTE — H&P ADULT - NSHPLABSRESULTS_GEN_ALL_CORE
7.4    11.44  )----------(  357       ( 2020 10:51 )               23.2      132    |  96     |  40.0   ----------------------------<  204        ( 2020 10:51 )  4.7     |  25.0   |  1.60     Ca    8.8        ( 2020 10:51 )    TPro  7.1    /  Alb  3.4    /  TBili  0.2    /  DBili  x      /  AST  13     /  ALT  13     /  AlkPhos  83     ( 2020 10:51 )    LIVER FUNCTIONS - ( 2020 10:51 )  Alb: 3.4 g/dL / Pro: 7.1 g/dL / ALK PHOS: 83 U/L / ALT: 13 U/L / AST: 13 U/L / GGT: x           PT/INR -  13.6 sec / 1.18 ratio   ( 2020 10:51 )       PTT -  25.3 sec   ( 2020 10:51 )  CAPILLARY BLOOD GLUCOSE        Urinalysis Basic - ( 2020 16:48 )    Color: Yellow / Appearance: Clear / S.010 / pH: x  Gluc: x / Ketone: Negative  / Bili: Negative / Urobili: Negative mg/dL   Blood: x / Protein: 30 mg/dL / Nitrite: Negative   Leuk Esterase: Negative / RBC: 0-2 /HPF / WBC 0-2   Sq Epi: x / Non Sq Epi: Occasional / Bacteria: Occasional

## 2020-07-30 NOTE — H&P ADULT - ASSESSMENT
1) sepsis likley secondary to pneumonia - id consult   --> iv abx  --> follow up cultures  --> rule out covid    2) Pneumonia likley gram pos and gram neg bacteria  --> plan as per #1    3) barbara - likely secondaty to dehydration  --> c.w fluids  --> avoid nehprotoxic agents    4) anemia - likley secondary to recent chemo  --> will consider transfusion tomorrow    5) esophagela cancer s.p stent - outpatient follow up

## 2020-07-30 NOTE — H&P ADULT - NSHPREVIEWOFSYSTEMS_GEN_ALL_CORE
REVIEW OF SYSTEMS:    CONSTITUTIONAL: No fever, weight loss, or fatigue  EYES: No eye pain, visual disturbances, or discharge  ENMT:  No difficulty hearing, tinnitus, vertigo; No sinus or throat pain  NECK: No pain or stiffness  BREASTS: No pain, masses, or nipple discharge  RESPIRATORY: No cough, wheezing, chills or hemoptysis; No shortness of breath  CARDIOVASCULAR: No chest pain, palpitations, dizziness, or leg swelling  GASTROINTESTINAL: No abdominal or epigastric pain. No nausea, vomiting, or hematemesis; No diarrhea or constipation. No melena or hematochezia.  GENITOURINARY: No dysuria, frequency, hematuria, or incontinence  NEUROLOGICAL: No headaches, memory loss, loss of strength, numbness, or tremors  SKIN: No itching, burning, rashes, or lesions   LYMPH NODES: No enlarged glands  ENDOCRINE: No heat or cold intolerance; No hair loss  MUSCULOSKELETAL: No joint pain or swelling; No muscle, back, or extremity pain  PSYCHIATRIC: No depression, anxiety, mood swings, or difficulty sleeping  HEME/LYMPH: No easy bruising, or bleeding gums  ALLERY AND IMMUNOLOGIC: No hives or eczema REVIEW OF SYSTEMS:    CONSTITUTIONAL: fatigue  EYES: No eye pain, visual disturbances, or discharge  ENMT:  No difficulty hearing, tinnitus, vertigo; No sinus or throat pain  NECK: No pain or stiffness  BREASTS: No pain, masses, or nipple discharge  RESPIRATORY: cough   CARDIOVASCULAR: No chest pain, palpitations, dizziness, or leg swelling  GASTROINTESTINAL: No abdominal or epigastric pain. No nausea, vomiting, or hematemesis; No diarrhea or constipation. No melena or hematochezia.  GENITOURINARY: No dysuria, frequency, hematuria, or incontinence  NEUROLOGICAL: No headaches, memory loss, loss of strength, numbness, or tremors  SKIN: No itching, burning, rashes, or lesions   LYMPH NODES: No enlarged glands  ENDOCRINE: No heat or cold intolerance; No hair loss  MUSCULOSKELETAL: No joint pain or swelling; No muscle, back, or extremity pain  PSYCHIATRIC: No depression, anxiety, mood swings, or difficulty sleeping  HEME/LYMPH: No easy bruising, or bleeding gums  ALLERY AND IMMUNOLOGIC: No hives or eczema

## 2020-07-30 NOTE — ED ADULT NURSE NOTE - PMH
Diabetes    Esophageal cancer  stent placement May 2020  History of gastroesophageal reflux (GERD)    HLD (hyperlipidemia)    HTN (hypertension)

## 2020-07-31 NOTE — PROGRESS NOTE ADULT - ASSESSMENT
1) sepsis secondary to pneumonia - id following   --> iv abx  --> follow up cultures    2) Pneumonia likley gram pos and gram neg bacteria  --> plan as per #1    3) barbara - improved    4) anemia -improved    5) esophagela cancer s.p stent - outpatient follow up

## 2020-07-31 NOTE — PROGRESS NOTE ADULT - SUBJECTIVE AND OBJECTIVE BOX
EDITH VALENCIA    [id number]    59y      Male    INTERVAL HPI/OVERNIGHT EVENTS: patient being seen for sepsis. Patient seen at bedside and states feeling well.     REVIEW OF SYSTEMS:    CONSTITUTIONAL: No fever, weight loss, or fatigue  RESPIRATORY: No cough, wheezing, hemoptysis; No shortness of breath  CARDIOVASCULAR: No chest pain, palpitations  GASTROINTESTINAL: No abdominal or epigastric pain. No nausea, vomiting  NEUROLOGICAL: No headaches, memory loss, loss of strength.  MISCELLANEOUS:      Vital Signs Last 24 Hrs  T(C): 36.9 (2020 07:23), Max: 38 (2020 12:25)  T(F): 98.4 (2020 07:23), Max: 100.4 (2020 12:25)  HR: 96 (2020 07:23) (96 - 109)  BP: 113/72 (2020 07:23) (96/57 - 113/72)  BP(mean): --  RR: 20 (2020 07:23) (18 - 20)  SpO2: 98% (2020 07:23) (97% - 98%)    PHYSICAL EXAM:    	GENERAL: NAD, well-groomed,  	HEAD:  Atraumatic, Normocephalic  	EYES: EOMI, PERRLA, conjunctiva and sclera clear  	ENMT: No tonsillar erythema, exudates, or enlargement; Moist mucous membranes,   	NECK: Supple, No JVD, Normal thyroid  	NERVOUS SYSTEM:  Alert & Oriented X3, Good concentration;   	CHEST/LUNG: diminsihed  	HEART: Regular rate and rhythm; No murmurs, rubs, or gallops  	ABDOMEN: Soft, Nontender, Nondistended; Bowel sounds present  	EXTREMITIES:  2+ Peripheral Pulses, No clubbing, cyanosis, or edema  	LYMPH: No lymphadenopathy noted  SKIN: No rashes or lesions      LABS:                        8.1    6.29  )-----------( 356      ( 2020 08:56 )             25.7     07-31    135  |  100  |  24.0<H>  ----------------------------<  152<H>  4.3   |  23.0  |  1.30    Ca    8.9      2020 08:56  Mg     1.6     07-31    TPro  6.8  /  Alb  3.0<L>  /  TBili  0.2<L>  /  DBili  x   /  AST  11  /  ALT  12  /  AlkPhos  72  07-31    PT/INR - ( 2020 10:51 )   PT: 13.6 sec;   INR: 1.18 ratio         PTT - ( 2020 10:51 )  PTT:25.3 sec  Urinalysis Basic - ( 2020 16:48 )    Color: Yellow / Appearance: Clear / S.010 / pH: x  Gluc: x / Ketone: Negative  / Bili: Negative / Urobili: Negative mg/dL   Blood: x / Protein: 30 mg/dL / Nitrite: Negative   Leuk Esterase: Negative / RBC: 0-2 /HPF / WBC 0-2   Sq Epi: x / Non Sq Epi: Occasional / Bacteria: Occasional          MEDICATIONS  (STANDING):  atorvastatin 10 milliGRAM(s) Oral at bedtime  cefepime   IVPB      cefepime   IVPB 1000 milliGRAM(s) IV Intermittent every 12 hours  dextrose 5%. 1000 milliLiter(s) (50 mL/Hr) IV Continuous <Continuous>  dextrose 50% Injectable 12.5 Gram(s) IV Push once  dextrose 50% Injectable 25 Gram(s) IV Push once  dextrose 50% Injectable 25 Gram(s) IV Push once  enoxaparin Injectable 40 milliGRAM(s) SubCutaneous daily  insulin lispro (HumaLOG) corrective regimen sliding scale   SubCutaneous three times a day before meals  lactated ringers. 1000 milliLiter(s) (100 mL/Hr) IV Continuous <Continuous>  magnesium sulfate  IVPB 2 Gram(s) IV Intermittent once  pantoprazole    Tablet 40 milliGRAM(s) Oral before breakfast    MEDICATIONS  (PRN):  acetaminophen   Tablet .. 650 milliGRAM(s) Oral every 6 hours PRN Temp greater or equal to 38C (100.4F), Mild Pain (1 - 3)  dextrose 40% Gel 15 Gram(s) Oral once PRN Blood Glucose LESS THAN 70 milliGRAM(s)/deciliter  glucagon  Injectable 1 milliGRAM(s) IntraMuscular once PRN Glucose LESS THAN 70 milligrams/deciliter  guaiFENesin   Syrup  (Sugar-Free) 100 milliGRAM(s) Oral every 6 hours PRN Cough  ondansetron Injectable 4 milliGRAM(s) IV Push every 6 hours PRN Nausea and/or Vomiting  oxycodone    5 mG/acetaminophen 325 mG 2 Tablet(s) Oral every 6 hours PRN Severe Pain (7 - 10)  oxycodone    5 mG/acetaminophen 325 mG 1 Tablet(s) Oral every 4 hours PRN Moderate Pain (4 - 6)      RADIOLOGY & ADDITIONAL TESTS:

## 2020-07-31 NOTE — PROGRESS NOTE ADULT - SUBJECTIVE AND OBJECTIVE BOX
INFECTIOUS DISEASES AND INTERNAL MEDICINE at [place]  =======================================================  Guido Farooq MD  Diplomates American Board of Internal Medicine and Infectious Diseases  Telephone [phone]  Fax            [phone]  =======================================================    EDITH VALENCIA [id number]    Follow up: FEVERS     Allergies:  Allergy Status Unknown  requests tuna salad (Unknown)      Medications:  acetaminophen   Tablet .. 650 milliGRAM(s) Oral every 6 hours PRN  atorvastatin 10 milliGRAM(s) Oral at bedtime  cefepime   IVPB      cefepime   IVPB 1000 milliGRAM(s) IV Intermittent every 12 hours  dextrose 40% Gel 15 Gram(s) Oral once PRN  dextrose 5%. 1000 milliLiter(s) IV Continuous <Continuous>  dextrose 50% Injectable 12.5 Gram(s) IV Push once  dextrose 50% Injectable 25 Gram(s) IV Push once  dextrose 50% Injectable 25 Gram(s) IV Push once  enoxaparin Injectable 40 milliGRAM(s) SubCutaneous daily  glucagon  Injectable 1 milliGRAM(s) IntraMuscular once PRN  guaiFENesin   Syrup  (Sugar-Free) 100 milliGRAM(s) Oral every 6 hours PRN  insulin lispro (HumaLOG) corrective regimen sliding scale   SubCutaneous three times a day before meals  lactated ringers. 1000 milliLiter(s) IV Continuous <Continuous>  ondansetron Injectable 4 milliGRAM(s) IV Push every 6 hours PRN  oxycodone    5 mG/acetaminophen 325 mG 2 Tablet(s) Oral every 6 hours PRN  oxycodone    5 mG/acetaminophen 325 mG 1 Tablet(s) Oral every 4 hours PRN  pantoprazole    Tablet 40 milliGRAM(s) Oral before breakfast    SOCIAL       FAMILY   FAMILY HISTORY:  FHx: throat cancer  FHx: diabetes mellitus    REVIEW OF SYSTEMS:  CONSTITUTIONAL:  No Fever or chills  HEENT:   No diplopia or blurred vision.  No earache, sore throat or runny nose.  CARDIOVASCULAR:  No pressure, squeezing, strangling, tightness, heaviness or aching about the chest, neck, axilla or epigastrium.  RESPIRATORY:    cough, shortness of breath,    GASTROINTESTINAL:  No nausea, vomiting or diarrhea.  GENITOURINARY:  No dysuria, frequency or urgency. No Blood in urine  MUSCULOSKELETAL:   AS PER HPI  SKIN:  No change in skin, hair or nails.  NEUROLOGIC:  No paresthesias, fasciculations, seizures or weakness.  PSYCHIATRIC:  No disorder of thought or mood.  ENDOCRINE:  No heat or cold intolerance, polyuria or polydipsia.  HEMATOLOGICAL:  No easy bruising or bleeding.            Physical Exam:  ICU Vital Signs Last 24 Hrs  T(C): 36.9 (31 Jul 2020 07:23), Max: 38.3 (30 Jul 2020 10:22)  T(F): 98.4 (31 Jul 2020 07:23), Max: 100.9 (30 Jul 2020 10:22)  HR: 96 (31 Jul 2020 07:23) (96 - 122)  BP: 113/72 (31 Jul 2020 07:23) (96/57 - 113/72)  BP(mean): --  ABP: --  ABP(mean): --  RR: 20 (31 Jul 2020 07:23) (18 - 21)  SpO2: 98% (31 Jul 2020 07:23) (95% - 98%)    GEN: NAD,   HEENT: normocephalic and atraumatic. EOMI. KAUSHAL.    NECK: Supple. No carotid bruits.  No lymphadenopathy or thyromegaly.  LUNGS: Clear to auscultation.  HEART: Regular rate and rhythm without murmur.  ABDOMEN: Soft, nontender, and nondistended.  Positive bowel sounds.    : No CVA tenderness  EXTREMITIES: Without any cyanosis, clubbing, rash, lesions or edema.  MSK: no joint swelling  NEUROLOGIC: Cranial nerves II through XII are grossly intact.  PSYCHIATRIC: Appropriate affect .  SKIN: No ulceration or induration present.        Labs:

## 2020-07-31 NOTE — SWALLOW BEDSIDE ASSESSMENT ADULT - COMMENTS
As per H&P: "Patient is a 58 y/o M pt with hx of DM, HTN, and esophageal ca (esophageal stent placed 5/28/2020) currently on chemo tx (carboplatin and taxol presents form [facility] with fever and cough. Patient was recently discharged from [facility] on 6/2020 for a similar presentation and was in his usual state of health. Patiet last received chemo on 6/30. PAtient then states he started to devleop a cough, lethargy, reduced appetite and subjective fever and was at the [facility] to receive a chemo port and was told to come to the ed. patient worked up in ed and found to have barbara and a fever. PAtient is being admitted for sepsis. Patient denies any recent travel or sick contacts"

## 2020-07-31 NOTE — PROGRESS NOTE ADULT - ASSESSMENT
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  PT WITH FEVERS AND WAS SENT TO ER FOR FURTHER MANAGEMENT  PT WITH RECENT HOSP STAY IN JUNE FOR ? PNEUMONIA    PT WITH UNDERLYING MALIGNANCY   BLOOD CX X2 SETS PENDING   AGREE WITH EMPIRIC ABX   CONSIDER FURTHER  IMAGING IF FEVERS CONTINUE  WILL FOLLOWUP

## 2020-07-31 NOTE — SWALLOW BEDSIDE ASSESSMENT ADULT - ASR SWALLOW ASPIRATION MONITOR
throat clearing/upper respiratory infection/gurgly voice/change of breathing pattern/pneumonia/oral hygiene/position upright (90Y)/fever/cough

## 2020-07-31 NOTE — SWALLOW BEDSIDE ASSESSMENT ADULT - SWALLOW EVAL: DIAGNOSIS
Oral & pharyngeal stage of swallow clinically unremarkable with no overt s/s aspiration for puree, solids & thin fluids

## 2020-07-31 NOTE — SWALLOW BEDSIDE ASSESSMENT ADULT - SWALLOW EVAL: RECOMMENDED FEEDING/EATING TECHNIQUES
allow for swallow between intakes/small sips/bites/position upright (90 degrees)/maintain upright posture during/after eating for 30 mins/Pt will "save" half of meals to be consumed later in day

## 2020-07-31 NOTE — SWALLOW BEDSIDE ASSESSMENT ADULT - SLP PERTINENT HISTORY OF CURRENT PROBLEM
h/o dysphagia denied upon ?. Pt reports consumption of 6 meals t/o day, since placement of stent in May 2020, with intake of small bites

## 2020-08-01 NOTE — DISCHARGE NOTE PROVIDER - HOSPITAL COURSE
Patient is a 60 y/o M pt with hx of DM, HTN, and esophageal ca (esophageal stent placed 5/28/2020) currently on chemo tx (carboplatin and taxol presents form [facility] with fever and cough. Patient was recently [facility] for a similar presentation and was in his usual state of health. Patient last received chemo on 6/30. PAtient then states he started to devleop a cough, lethargy, reduced appetite and subjective fever and was at the [facility] to receive a chemo port and was told to come to the ed. patient worked up in ed and found to have barbara and a fever. PAtient is being admitted for sepsis.        patient seen by id and started on iv cefepime, cultures negative and climnically improved and is now cleared for dc home with po abx        time spent on d c 34 minutes        pe        	GENERAL: NAD, well-groomed,    	HEAD:  Atraumatic, Normocephalic    	EYES: EOMI, PERRLA, conjunctiva and sclera clear    	ENMT: No tonsillar erythema, exudates, or enlargement; Moist mucous membranes,     	NECK: Supple, No JVD, Normal thyroid    	NERVOUS SYSTEM:  Alert & Oriented X3, Good concentration;     	CHEST/LUNG: diminsihed    	HEART: Regular rate and rhythm; No murmurs, rubs, or gallops    	ABDOMEN: Soft, Nontender, Nondistended; Bowel sounds present    	EXTREMITIES:  2+ Peripheral Pulses, No clubbing, cyanosis, or edema    	LYMPH: No lymphadenopathy noted    SKIN: No rashes or lesions

## 2020-08-01 NOTE — DISCHARGE NOTE PROVIDER - CARE PROVIDERS DIRECT ADDRESSES
[email or web address]

## 2020-08-01 NOTE — PROGRESS NOTE ADULT - SUBJECTIVE AND OBJECTIVE BOX
[facility]  INFECTIOUS DISEASES AND INTERNAL MEDICINE at [place]  =======================================================  Guido Farooq MD  Diplomates American Board of Internal Medicine and Infectious Diseases  Tel  [phone]  Fax [phone]  =======================================================    [facility]-[id number]  EDITH [place]ENCIA   follow up for: fevers  patient seen and examined.       no more fevers  blood and urine cx sent    Renal function better      I have personally reviewed the labs and data; pertinent labs and data are listed in this note; please see below.   ===================================================  REVIEW OF SYSTEMS:  CONSTITUTIONAL:  No Fever or chills  HEENT:  No diplopia or blurred vision.  No earache, sore throat or runny nose.  CARDIOVASCULAR:  No pressure, squeezing, strangling, tightness, heaviness or aching about the chest, neck, axilla or epigastrium.  RESPIRATORY:  No cough, shortness of breath  GASTROINTESTINAL:  No nausea, vomiting or diarrhea.  GENITOURINARY:  No dysuria, frequency or urgency. No Blood in urine  MUSCULOSKELETAL:  no joint aches, no muscle pain  SKIN:  No change in skin, hair or nails.  NEUROLOGIC:  No Headaches, seizures or weakness.  PSYCHIATRIC:  No disorder of thought or mood.  ENDOCRINE:  No heat or cold intolerance  HEMATOLOGICAL:  No easy bruising or bleeding.   =======================================================  Allergies    Allergy Status Unknown    Intolerances    requests tuna salad (Unknown)  Antibiotics:  cefepime   IVPB      cefepime   IVPB 1000 milliGRAM(s) IV Intermittent every 12 hours    Other medications:  atorvastatin 10 milliGRAM(s) Oral at bedtime  dextrose 5%. 1000 milliLiter(s) IV Continuous <Continuous>  dextrose 50% Injectable 12.5 Gram(s) IV Push once  dextrose 50% Injectable 25 Gram(s) IV Push once  dextrose 50% Injectable 25 Gram(s) IV Push once  enoxaparin Injectable 40 milliGRAM(s) SubCutaneous daily  insulin lispro (HumaLOG) corrective regimen sliding scale   SubCutaneous three times a day before meals  lactated ringers. 1000 milliLiter(s) IV Continuous <Continuous>  pantoprazole    Tablet 40 milliGRAM(s) Oral before breakfast    ======================================================  Physical Exam:  ============  T(F): 98.2 (01 Aug 2020 09:36), Max: 98.9 (31 Jul 2020 20:33)  HR: 106 (01 Aug 2020 09:36)  BP: 124/80 (01 Aug 2020 09:36)  RR: 18 (01 Aug 2020 09:36)  SpO2: 97% (31 Jul 2020 20:33) (97% - 98%)  temp max in last 48H T(F): , Max: 99.1 (07-30-20 @ 18:28)    General:  No acute distress.  Eye: Pupils are equal, round and reactive to light, Extraocular movements are intact, Normal conjunctiva.  HENT: Normocephalic, Oral mucosa is moist, No pharyngeal erythema, No sinus tenderness.  Neck: Supple, No lymphadenopathy.  Respiratory: Lungs are clear to auscultation, Respirations are non-labored.  Cardiovascular: Normal rate, Regular rhythm,    Gastrointestinal: Soft, Non-tender, Non-distended, Normal bowel sounds.  Genitourinary: No costovertebral angle tenderness.  Lymphatics: No lymphadenopathy neck,   Musculoskeletal: Normal range of motion, Normal strength.  Integumentary: No rash.  Neurologic: Alert, Oriented, No focal deficits, Cranial Nerves II-XII are grossly intact.  Psychiatric: Appropriate mood & affect.  =======================================================  Labs:                        8.1    5.37  )-----------( 350      ( 01 Aug 2020 07:05 )             25.2      08-01    136  |  101  |  17.0  ----------------------------<  176<H>  4.1   |  26.0  |  1.27    Ca    9.0      01 Aug 2020 07:05  Mg     1.7     08-01    TPro  7.1  /  Alb  3.2<L>  /  TBili  <0.2<L>  /  DBili  x   /  AST  12  /  ALT  11  /  AlkPhos  76  08-01      Culture - Urine (collected 07-30-20 @ 21:51)  Source: .Urine Clean Catch (Midstream)  Final Report (08-01-20 @ 08:12):    No growth    Culture - Blood (collected 07-30-20 @ 11:04)  Source: .Blood Blood-Peripheral    Culture - Blood (collected 07-30-20 @ 11:03)  Source: .Blood Blood-Peripheral      Creatinine, Serum: 1.27 mg/dL (08-01-20 @ 07:05)  Creatinine, Serum: 1.30 mg/dL (07-31-20 @ 08:56)  Creatinine, Serum: 1.60 mg/dL (07-30-20 @ 10:51)  Creatinine, Serum: 1.78 mg/dL (07-28-20 @ 13:28)

## 2020-08-01 NOTE — DISCHARGE NOTE PROVIDER - PROVIDER TOKENS
PROVIDER:[TOKEN:[[id number]]],FREE:[LAST:[[facility]],PHONE:[(   )    -],FAX:[(   )    -],ADDRESS:[pcp]]

## 2020-08-01 NOTE — DISCHARGE NOTE PROVIDER - NSDCCPCAREPLAN_GEN_ALL_CORE_FT
PRINCIPAL DISCHARGE DIAGNOSIS  Diagnosis: Pneumonia of left lower lobe due to infectious organism  Assessment and Plan of Treatment:       SECONDARY DISCHARGE DIAGNOSES  Diagnosis: Esophageal cancer  Assessment and Plan of Treatment:

## 2020-08-01 NOTE — DISCHARGE NOTE PROVIDER - CARE PROVIDER_API CALL
Ana Santiago  MEDICAL ONCOLOGY  [address] [place] [address]  Phone: [phone]  Fax: [phone]  Follow Up Time:     [facility],   pcp  Phone: (   )    -  Fax: (   )    -  Follow Up Time:

## 2020-08-01 NOTE — DISCHARGE NOTE PROVIDER - NSDCMRMEDTOKEN_GEN_ALL_CORE_FT
atorvastatin 10 mg oral tablet: 1 tab(s) orally once a day  Augmentin 875 mg-125 mg oral tablet: 1 tab(s) orally 2 times a day   lisinopril 2.5 mg oral tablet: 1 tab(s) orally once a day  magnesium oxide 400 mg (241.3 mg elemental magnesium) oral tablet: 1 tab(s) orally 3 times a day  metFORMIN 1000 mg oral tablet: 1 tab(s) orally 2 times a day  metoprolol succinate 50 mg oral tablet, extended release: 1 tab(s) orally once a day( newly prescribe for elevated HR, will start today)  ondansetron 8 mg oral tablet, disintegratin tab(s) orally every 8 hours, As Needed - for nausea  OXYCODONE HYDROCHLORIDE  5 MG TABS:   pantoprazole 40 mg oral delayed release tablet: 1 tab(s) orally once a day  prochlorperazine 10 mg oral tablet: 1 tab(s) orally every 6 hours, As Needed - for nausea  sucralfate 1 g/10 mL oral suspension: 10 milliliter(s) orally 4 times a day

## 2020-08-01 NOTE — DISCHARGE NOTE NURSING/CASE MANAGEMENT/SOCIAL WORK - PATIENT PORTAL LINK FT
You can access the FollowMyHealth Patient Portal offered by [facility] by registering at the following website: [email or web address]. By joining [employer]’s FollowMyHealth portal, you will also be able to view your health information using other applications (apps) compatible with our system.

## 2020-08-01 NOTE — PROGRESS NOTE ADULT - ASSESSMENT
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  PT WITH FEVERS AND WAS SENT TO ER FOR FURTHER MANAGEMENT      PT WITH RECENT HOSP STAY IN JUNE FOR ? PNEUMONIA     continue empiric Antibiotics:  cefepime   IVPB 1000 milliGRAM(s) IV Intermittent every 12 hours     PT WITH UNDERLYING MALIGNANCY   BLOOD CX X2 SETS sent    - follow up all outstanding cultures  - trend temperature and WBC curve  - repeat cultures from blood and all sources if febrile.

## 2020-08-03 PROBLEM — Z92.21 HISTORY OF CHEMOTHERAPY: Status: ACTIVE | Noted: 2020-01-01

## 2020-08-04 NOTE — HISTORY OF PRESENT ILLNESS
[de-identified] : Esophageal adenocarcinoma, HER-2 positive [de-identified] : The patient was diagnosed with adenocarcinoma of the esophagus in May 2020 at the age of 59.  He initially saw GI, Dr. Jacek Adhikari, in March 2019 due to anemia.  A colonoscopy was done in July 2019 and an EGD was discussed but not done until 5/20/20 when he was f/w an esophageal mass.  Pathology c/w adenocarcinoma.  Staging CT showed  marked thickening of GE junction without definable tumor. There is evidence of small gastrohepatic ligament lymph nodes measuring up to 1.2 cm in size.  New radiographic finding of a mid small bowel lesion with post symmetrical and asymmetrical thickening of the bowel wall without significant surrounding inflammatory process or definable intussusception. Given the focal nature this is more suspicious for small bowel tumor as opposed to an inflammatory process. [de-identified] : Past medical history of DM, HTN, and CKD\par SH: Non - smoker and no ETOH.  No environmental exposures.  No hx of reflux.  There is a hx of second hand smoke.  Pt lives with his mother.   Pt works as a  but now on unemployment.   [de-identified] : Pt presents for follow up visit during C3D1 induction chemotherapy for dysphagia with carbo /taxol.  Due to inability to tolerate solids and only puréed diet with weight loss, GI placed esophageal stent 5/28/20 and he can now tolerate solids that are soft.   He reports a 20 -lb weight loss over 8 weeks.  Screening colonoscopy 7/19/20. Today, denies diarrhea, back pain, sob, cough, n/v/d or any other sx.

## 2020-08-06 PROBLEM — Z01.818 PREOP TESTING: Status: ACTIVE | Noted: 2020-01-01

## 2020-08-13 NOTE — ED ADULT NURSE REASSESSMENT NOTE - NS ED NURSE REASSESS COMMENT FT1
Report given to Elena RAMIREZ. Pt transferred out of critical care to ER B side 8Left. Pt in no distress. alert and oriented x4. resting comfortably. vital signs improving at this time TERESA RN

## 2020-08-13 NOTE — ED ADULT NURSE REASSESSMENT NOTE - NS ED NURSE REASSESS COMMENT FT1
Assumed patient care at , report received from previous RN, charting as noted. Patient A&Ox4, denies any pain or discomfort. Denies any chest pain, shortness of breath, nausea or dizziness. Respirations even & unlabored. Cardiac monitor in place. Saline lock in place, patent, negative s/s phlebitis or infiltration. IVF in progress, tolerated well. Negative s/s fluid volume overload. IVABX in progress, negative adverse reaction. Plan of care discussed, all questions answered. Family at bedside. Will monitor.

## 2020-08-13 NOTE — ED ADULT NURSE NOTE - NSIMPLEMENTINTERV_GEN_ALL_ED
Implemented All Universal Safety Interventions:  [place] to call system. Call bell, personal items and telephone within reach. Instruct patient to call for assistance. Room bathroom lighting operational. Non-slip footwear when patient is off stretcher. Physically safe environment: no spills, clutter or unnecessary equipment. Stretcher in lowest position, wheels locked, appropriate side rails in place.

## 2020-08-13 NOTE — CONSULT NOTE ADULT - SUBJECTIVE AND OBJECTIVE BOX
HPI:  60 y/o M pt with hx of DM, HTN, and esophageal ca (esophageal stent placed 5/28/2020) was on chemo ~1 month ago, none currently, sent from radiology for fever/cough. Pt was going for biopsy and port placement for chemo, had fever to 102. Pt states had coughing last night with some vomiting. Similar to prior presentations of pna. Recently admitted for fever ~2 weeks ago, just finished augmentin last week. Has some mild chronic abd discomfort which he states he always has, unchanged, hasn't taken his oxy today. No diarrhea or any other symptoms, no meningeal symptoms, no cp/sob or dizziness.     	ROS: + fever/chills. No eye pain/changes in vision, No ear pain/sore throat/dysphagia, No chest pain/palpitations. No SOB  no black/bloody bm. No dysuria/frequency/discharge, No headache. No Dizziness.    No rashes or breaks in skin. No numbness/tingling/weakness.      Oncologic History:    The patient was diagnosed with adenocarcinoma of the esophagus in May 2020 at the age of 59. He initially saw GI, Dr. Jacek Adhikari, in March 2019 due to anemia. A colonoscopy was done in July 2019 and an EGD was discussed but not done until 5/20/20 when he was f/w an esophageal mass. Pathology c/w adenocarcinoma. Staging CT showed marked thickening of GE junction without definable tumor. There is evidence of small gastrohepatic ligament lymph nodes measuring up to 1.2 cm in size. New radiographic finding of a mid small bowel lesion with post symmetrical and asymmetrical thickening of the bowel wall without significant surrounding inflammatory process or definable intussusception. Given the focal nature this is more suspicious for small bowel tumor as opposed to an inflammatory process.       PAST MEDICAL & SURGICAL HISTORY:  HTN (hypertension)  HLD (hyperlipidemia)  History of gastroesophageal reflux (GERD)  Esophageal cancer: stent placement May 2020  Diabetes  H/O colonoscopy  History of cancer chemotherapy      MEDICATIONS  (STANDING):    MEDICATIONS  (PRN):      Allergies    Allergy Status Unknown    Intolerances    requests tuna salad (Unknown)      FAMILY HISTORY:  FHx: throat cancer  FHx: diabetes mellitus      Review of Systems    Constitutional, Eyes, ENT, Cardiovascular, Respiratory, Gastrointestinal, Genitourinary, Musculoskeletal, Integumentary, Neurological, Psychiatric, Endocrine, Heme/Lymph and Allergic/Immunologic review of systems are otherwise negative except as noted in HPI.     Vital Signs Last 24 Hrs  T(C): 37 (13 Aug 2020 14:40), Max: 39.5 (13 Aug 2020 10:06)  T(F): 98.6 (13 Aug 2020 14:40), Max: 103.1 (13 Aug 2020 10:06)  HR: 94 (13 Aug 2020 14:40) (94 - 143)  BP: 91/51 (13 Aug 2020 14:40) (75/49 - 101/54)  BP(mean): --  RR: 15 (13 Aug 2020 14:40) (15 - 20)  SpO2: 99% (13 Aug 2020 14:40) (90% - 99%)      LABS:  CBC Full  -  ( 13 Aug 2020 10:36 )  WBC Count : 15.43 K/uL  RBC Count : 2.92 M/uL  Hemoglobin : 9.4 g/dL  Hematocrit : 29.4 %  Platelet Count - Automated : 383 K/uL  Mean Cell Volume : 100.7 fl  Mean Cell Hemoglobin : 32.2 pg  Mean Cell Hemoglobin Concentration : 32.0 gm/dL  Auto Neutrophil # : 14.49 K/uL  Auto Lymphocyte # : 0.40 K/uL  Auto Monocyte # : 0.40 K/uL  Auto Eosinophil # : 0.00 K/uL  Auto Basophil # : 0.00 K/uL  Auto Neutrophil % : 93.9 %  Auto Lymphocyte % : 2.6 %  Auto Monocyte % : 2.6 %  Auto Eosinophil % : 0.0 %  Auto Basophil % : 0.0 %    08-13    133<L>  |  93<L>  |  40.0<H>  ----------------------------<  237<H>  5.0   |  24.0  |  1.68<H>    Ca    9.5      13 Aug 2020 10:36    TPro  7.8  /  Alb  3.8  /  TBili  0.4  /  DBili  x   /  AST  17  /  ALT  14  /  AlkPhos  96  08-13    PT/INR - ( 13 Aug 2020 10:36 )   PT: 13.4 sec;   INR: 1.16 ratio         PTT - ( 13 Aug 2020 10:36 )  PTT:24.7 sec      RADIOLOGY & ADDITIONAL STUDIES:    < from: Xray Chest 1 View-PORTABLE IMMEDIATE (08.13.20 @ 11:08) >     EXAM:  XR CHEST PORTABLE IMMED 1V                          PROCEDURE DATE:  08/13/2020          INTERPRETATION:  Portable chest radiograph    CLINICAL INFORMATION:   Sepsis    TECHNIQUE:  Portable  AP view of the chest was obtained.    COMPARISON:No previous examinations are available for review.    FINDINGS:  The lungs are clear of airspace consolidations or effusions. No pneumothorax.  A gastroesophageal stent in place.  The heart and mediastinum are within normal limits.    Visualized osseous structures are intact.        IMPRESSION:   No evidence of active chest disease.          < end of copied text >

## 2020-08-13 NOTE — ED ADULT TRIAGE NOTE - CHIEF COMPLAINT QUOTE
Went to radiology today for port placement and biopsy for esophageal cancer and was found the be febrile.  Pt states he began coughing last night with vomiting.

## 2020-08-13 NOTE — ED PROVIDER NOTE - PROGRESS NOTE DETAILS
Christopher: pt feeling improvement. with likely retrocardiac on cxr, satting well on 2L. pulse improved to ~105 with antipyretics/fluids and bp ~110/60. received abx. awaiting onc clalback. admitted to Dr. Mobley. lactate was 2, recived 30 ml/kg.

## 2020-08-13 NOTE — ED ADULT NURSE NOTE - OBJECTIVE STATEMENT
Patient was at outpatient pre surgical testing and found to be tachycardic, hypotensive and febrile. Patient was directed to critical care for further evaluation and treatment due to blood pressure. Pt seen and evaluated by Dr White. see flowsheet for further treatments.

## 2020-08-13 NOTE — ED PROVIDER NOTE - PHYSICAL EXAMINATION
Gen: No acute distress, non toxic  HEENT: Mucous membranes moist, pink conjunctivae, EOMI  CV: tachy 140, nl s1/s2.  Resp: CTAB, normal rate and effort  GI: Abdomen soft, NT, ND. No rebound, no guarding  : No CVAT  Neuro: A&O x 3, moving all 4 extremities  MSK: No spine or joint tenderness to palpation  Skin: No rashes. intact and perfused. warm.

## 2020-08-13 NOTE — H&P ADULT - HISTORY OF PRESENT ILLNESS
59y/oM PMH HTN, DM, esophageal CA (s/p esophageal stent 5/28/20) sent from radiology with fever/cough. Pt planned for biopsy, port placement for chemo, found to have fever 102, also with coughing starting last night. +associated emesis. Has had similar presentation with recent admissions to [facility], treated with IV Cefepime and d/c'ed on PO abx. Additionally reports intermittent L-sided abd pain (for months). Denies constipation, diarrhea, dysuria, or other urinary symptoms, CP, SOB.

## 2020-08-13 NOTE — ED PROVIDER NOTE - OBJECTIVE STATEMENT
58 y/o M pt with hx of DM, HTN, and esophageal ca (esophageal stent placed 5/28/2020) was on chemo ~1 month ago, none currently, sent from radiology for fever/cough. Pt was going for biopsy and port placement for chemo, had fever to 102. Pt states had coughing last night with some vomiting. Similar to prior presentations of pna. Recently admitted for fever ~2 weeks ago, just finished augmentin last week. Has some mild chronic abd discomfort which he states he always has, unchanged, hasn't taken his oxy today. No diarrhea or any other symptoms, no meningeal symptoms, no cp/sob or dizziness.     ROS: + fever/chills. No eye pain/changes in vision, No ear pain/sore throat/dysphagia, No chest pain/palpitations. No SOB  no black/bloody bm. No dysuria/frequency/discharge, No headache. No Dizziness.    No rashes or breaks in skin. No numbness/tingling/weakness.

## 2020-08-13 NOTE — H&P ADULT - ASSESSMENT
59y/oM PMH HTN, DM, esophageal CA (s/p esophageal stent 5/28/20) sent from radiology with fever/cough.     Fever  Sepsis on admission  -s/p IVF, azithromycin, zosyn, vanc, cefepime in ER  -f/u blood cx   -f/u urine studies   -CXR neg  -CT c/a/p ordered  -ID input appreciated    Esophageal adenocarcinoma  -planned for port placement, outpt chemo when optimized  -heme/onc input appreciated  -cont oxycodone PRN  -PRN anti-emetics     ISIDRO  -likely 2/2 dehydration, trend BMP    HTN/HLD  -hold lisinopril in setting of isidro  -cont metoprolol with holding parameters   -cont statin    DM  -on metformin, hold while here  -cont ISS

## 2020-08-13 NOTE — ED PROVIDER NOTE - CLINICAL SUMMARY MEDICAL DECISION MAKING FREE TEXT BOX
esophageal cancer, hx pna, arrived hypotense ~80-90/40-50, tachy to 140, febrile to 103, satting 90%, denies acute distress and mentating normally conversing comfortably. code sepsis, abx, fluids, xr. Keisha/Dr. Fenton messaged, reassess.

## 2020-08-13 NOTE — CONSULT NOTE ADULT - ASSESSMENT
60 yo male with unresectable Her2+ esophageal adenocarcinoma, admitted with leukocytosis, fever and vomiting x 24 hrs.    -fever workup ongoing  -needs port placement for chemotherapy outpt and biopsy with IR when medically optimized.

## 2020-08-13 NOTE — CONSULT NOTE ADULT - SUBJECTIVE AND OBJECTIVE BOX
INFECTIOUS DISEASES AND INTERNAL MEDICINE at [place]  =======================================================  Guido Farooq MD  Diplomates American Board of Internal Medicine and Infectious Diseases  Telephone [phone]  Fax            [phone]  =======================================================    EDITH [id number]      HPI:60 y/o M pt with hx of DM, HTN, and esophageal ca (esophageal stent placed 5/28/2020) was on chemo ~1 month ago, none currently, sent from radiology for fever/cough. Pt was going for biopsy and port placement for chemo, had fever to 102. Pt states had coughing last night with some vomiting. Similar to prior presentations of pna. PT WITH RECENT ADMISSION TO [facility]  FOR FEVERS AND WAS ON IV CEFEPIME AND DISCHARGED HOME ON ORAL ABX   . Has some mild chronic abd discomfort which he states he always has, unchanged, hasn't taken his oxy today. No diarrhea or any other symptoms, no meningeal symptoms, no cp/sob or dizziness.   PT AWAKE ALERT NON TOXIC FAMILY MEMBER AT BEDSIDE  ASKED TO EVALUATE FROM ID STANDPOINT       PAST MEDICAL & SURGICAL HISTORY:  HTN (hypertension)  HLD (hyperlipidemia)  History of gastroesophageal reflux (GERD)  Esophageal cancer: stent placement May 2020  Diabetes  H/O colonoscopy  History of cancer chemotherapy      ANTIBIOTICS      Allergies    Allergy Status Unknown    Intolerances    requests tuna karolyn (Unknown)      SOCIAL HISTORY:     FAMILY HX   FAMILY HISTORY:  FHx: throat cancer  FHx: diabetes mellitus      Vital Signs Last 24 Hrs  T(C): 37 (13 Aug 2020 14:40), Max: 39.5 (13 Aug 2020 10:06)  T(F): 98.6 (13 Aug 2020 14:40), Max: 103.1 (13 Aug 2020 10:06)  HR: 94 (13 Aug 2020 14:40) (94 - 143)  BP: 91/51 (13 Aug 2020 14:40) (75/49 - 101/54)  BP(mean): --  RR: 15 (13 Aug 2020 14:40) (15 - 20)  SpO2: 99% (13 Aug 2020 14:40) (90% - 99%)  Drug Dosing Weight  Height (cm): 165.1 (30 Jul 2020 10:22)  Weight (kg): 63.5 (30 Jul 2020 10:22)  BMI (kg/m2): 23.3 (30 Jul 2020 10:22)  BSA (m2): 1.7 (30 Jul 2020 10:22)      REVIEW OF SYSTEMS:    CONSTITUTIONAL:  As per HPI.    HEENT:  Eyes:  No diplopia or blurred vision. ENT:  No earache, sore throat or runny nose.    CARDIOVASCULAR:  No pressure, squeezing, strangling, tightness, heaviness or aching about the chest, neck, axilla or epigastrium.    RESPIRATORY:  No cough, shortness of breath, PND or orthopnea.    GASTROINTESTINAL:  No nausea, vomiting or diarrhea.    GENITOURINARY:  No dysuria, frequency or urgency.    MUSCULOSKELETAL:  As per HPI.    SKIN:  No change in skin, hair or nails.    NEUROLOGIC:  No paresthesias, fasciculations, seizures or weakness.                  PHYSICAL EXAMINATION:    GENERAL: The patient is a well-developed, well-nourished _____in no apparent distress. ___ is alert and oriented x3.    VITAL SIGNS: T(C): 37 (08-13-20 @ 14:40), Max: 39.5 (08-13-20 @ 10:06)  HR: 94 (08-13-20 @ 14:40) (94 - 143)  BP: 91/51 (08-13-20 @ 14:40) (75/49 - 101/54)  RR: 15 (08-13-20 @ 14:40) (15 - 20)  SpO2: 99% (08-13-20 @ 14:40) (90% - 99%)  Wt(kg): --    HEENT: Head is normocephalic and atraumatic.  ANICTERIC  NECK: Supple. No carotid bruits.  No lymphadenopathy or thyromegaly.    LUNGS:COARSE BREATH SOUNDS    HEART: Regular rate and rhythm without murmur.    ABDOMEN: Soft, nontender, and nondistended.  Positive bowel sounds.  No hepatosplenomegaly was noted. NO REBOUND NO GUARDING    EXTREMITIES: NO EDEMA NO ERYTHEMA    NEUROLOGIC: NON FOCAL      SKIN: No ulceration or induration present. NO RASH        BLOOD CULTURES       URINE CX          LABS:                        9.4    15.43 )-----------( 383      ( 13 Aug 2020 10:36 )             29.4     08-13    133<L>  |  93<L>  |  40.0<H>  ----------------------------<  237<H>  5.0   |  24.0  |  1.68<H>    Ca    9.5      13 Aug 2020 10:36    TPro  7.8  /  Alb  3.8  /  TBili  0.4  /  DBili  x   /  AST  17  /  ALT  14  /  AlkPhos  96  08-13    PT/INR - ( 13 Aug 2020 10:36 )   PT: 13.4 sec;   INR: 1.16 ratio         PTT - ( 13 Aug 2020 10:36 )  PTT:24.7 sec      RADIOLOGY & ADDITIONAL STUDIES:      ASSESSMENT/PLAN    M pt with hx of DM, HTN, and esophageal ca (esophageal stent placed 5/28/2020) was on chemo ~1 month ago, none currently, sent from radiology for fever/cough. Pt was going for biopsy and port placement for chemo, had fever to 102. Pt states had coughing last night with some vomiting. Similar to prior presentations of pna. PT WITH RECENT ADMISSION TO [facility]  FOR FEVERS AND WAS ON IV CEFEPIME AND DISCHARGED HOME ON ORAL ABX   . Has some mild chronic abd discomfort which he states he always has, unchanged, hasn't taken his oxy today. No diarrhea or any other symptoms, no meningeal symptoms, no cp/sob or dizziness.   PT AWAKE ALERT NON TOXIC FAMILY MEMBER AT BEDSIDE  HAS GOTTEN FLUIDS IN ER AND FEELS BETTER  CXR NEG   AWAITING IMAGING  BLOOD CX X2 SETS DONE  WOULD RECOMMEND EMPRIC  IV ABX  ZOSYN  IN THIS IMMUNOCOMPROMISED   PATIENT UNTIL CX ARE BACK  WILL FOLLOWUP WITH FURTHER RECOMMENDATIONS               EVANGELINA RUTH MD

## 2020-08-13 NOTE — H&P ADULT - NSHPLABSRESULTS_GEN_ALL_CORE
9.4    15.43 )-----------( 383      ( 13 Aug 2020 10:36 )             29.4   08-13    133<L>  |  93<L>  |  40.0<H>  ----------------------------<  237<H>  5.0   |  24.0  |  1.68<H>    Ca    9.5      13 Aug 2020 10:36    TPro  7.8  /  Alb  3.8  /  TBili  0.4  /  DBili  x   /  AST  17  /  ALT  14  /  AlkPhos  96  08-13    < from: Xray Chest 1 View-PORTABLE IMMEDIATE (08.13.20 @ 11:08) >    INTERPRETATION:  Portable chest radiograph    CLINICAL INFORMATION:   Sepsis    TECHNIQUE:  Portable  AP view of the chest was obtained.    COMPARISON:No previous examinations are available for review.    FINDINGS:  The lungs are clear of airspace consolidations or effusions. No pneumothorax.  A gastroesophageal stent in place.  The heart and mediastinum are within normal limits.    Visualized osseous structures are intact.        IMPRESSION:   No evidence of active chest disease.    < end of copied text >

## 2020-08-13 NOTE — H&P ADULT - NSHPPHYSICALEXAM_GEN_ALL_CORE
CONSTITUTIONAL: NAD  EYES: +EOMI  CARDIAC: Regular rate, regular rhythm.  normal +S1, S2.  No murmurs, rubs or gallops.  RESPIRATORY: course sounds in bases  GASTROINTESTINAL: Abdomen soft, no guarding; mild L-sided tenderness with deep aplpation  NEUROLOGICAL: Alert and oriented, no focal deficits  SKIN: warm, dry, no rashes noted  PSYCHIATRIC: normal affect

## 2020-08-14 NOTE — SWALLOW BEDSIDE ASSESSMENT ADULT - SWALLOW EVAL: DIAGNOSIS
Oral & pharyngeal stage of swallow clinically unremarkable for puree, mech soft & thin fluids with no overt s/s aspiration

## 2020-08-14 NOTE — PROGRESS NOTE ADULT - SUBJECTIVE AND OBJECTIVE BOX
EDITH VALENCIA    [id number]    59y      Male    CC: fever    INTERVAL HPI/OVERNIGHT EVENTS: pt seen and examined. no complaints, reports improving cough    REVIEW OF SYSTEMS:    CONSTITUTIONAL: No fever, weight loss, or fatigue  RESPIRATORY: No cough, wheezing, hemoptysis; No shortness of breath  CARDIOVASCULAR: No chest pain, palpitations  GASTROINTESTINAL: No abdominal or epigastric pain. No nausea, vomiting  NEUROLOGICAL: No headaches, memory loss, loss of strength.    Vital Signs Last 24 Hrs  T(C): 36.4 (14 Aug 2020 07:51), Max: 37.1 (13 Aug 2020 23:36)  T(F): 97.6 (14 Aug 2020 07:51), Max: 98.7 (13 Aug 2020 23:36)  HR: 90 (14 Aug 2020 07:51) (90 - 105)  BP: 113/66 (14 Aug 2020 07:51) (91/51 - 113/66)  BP(mean): 78 (13 Aug 2020 15:56) (78 - 78)  RR: 18 (14 Aug 2020 07:51) (15 - 19)  SpO2: 95% (14 Aug 2020 07:51) (94% - 99%)    PHYSICAL EXAM:    GENERAL: NAD  HEENT:  +EOMI  CHEST/LUNG: Clear to auscultation bilaterally  HEART: S1S2+, Regular rate and rhythm  ABDOMEN: Soft, Nontender, Nondistended; Bowel sounds present  SKIN: warm, dry  NEURO: awake, alert, non-focal  PSYCH: normal affect    LABS:                        8.1    7.88  )-----------( 319      ( 14 Aug 2020 09:45 )             26.1     08-14    136  |  99  |  25.0<H>  ----------------------------<  160<H>  4.7   |  25.0  |  1.39<H>    Ca    9.3      14 Aug 2020 09:45  Phos  3.2     08-14  Mg     1.5     08-14    TPro  7.0  /  Alb  3.4  /  TBili  0.2<L>  /  DBili  x   /  AST  11  /  ALT  11  /  AlkPhos  75  08-14    PT/INR - ( 13 Aug 2020 10:36 )   PT: 13.4 sec;   INR: 1.16 ratio         PTT - ( 13 Aug 2020 10:36 )  PTT:24.7 sec  Urinalysis Basic - ( 13 Aug 2020 19:25 )    Color: Yellow / Appearance: Clear / S.005 / pH: x  Gluc: x / Ketone: Negative  / Bili: Negative / Urobili: Negative mg/dL   Blood: x / Protein: 30 mg/dL / Nitrite: Negative   Leuk Esterase: Negative / RBC: 0-2 /HPF / WBC 0-2   Sq Epi: x / Non Sq Epi: Occasional / Bacteria: Few          MEDICATIONS  (STANDING):  atorvastatin 10 milliGRAM(s) Oral at bedtime  dextrose 5%. 1000 milliLiter(s) (50 mL/Hr) IV Continuous <Continuous>  dextrose 50% Injectable 12.5 Gram(s) IV Push once  dextrose 50% Injectable 25 Gram(s) IV Push once  dextrose 50% Injectable 25 Gram(s) IV Push once  enoxaparin Injectable 30 milliGRAM(s) SubCutaneous daily  insulin lispro (HumaLOG) corrective regimen sliding scale   SubCutaneous three times a day before meals  insulin lispro (HumaLOG) corrective regimen sliding scale   SubCutaneous at bedtime  metoprolol succinate ER 50 milliGRAM(s) Oral daily  pantoprazole    Tablet 40 milliGRAM(s) Oral before breakfast  piperacillin/tazobactam IVPB.. 3.375 Gram(s) IV Intermittent every 8 hours    MEDICATIONS  (PRN):  dextrose 40% Gel 15 Gram(s) Oral once PRN Blood Glucose LESS THAN 70 milliGRAM(s)/deciliter  glucagon  Injectable 1 milliGRAM(s) IntraMuscular once PRN Glucose LESS THAN 70 milligrams/deciliter  lactulose Syrup 20 Gram(s) Oral daily PRN constipation  oxyCODONE    IR 10 milliGRAM(s) Oral every 6 hours PRN Moderate Pain (4 - 6)  sucralfate 1 Gram(s) Oral four times a day PRN gi upset      RADIOLOGY & ADDITIONAL TESTS:    < from: CT Abdomen and Pelvis No Cont (20 @ 17:25) >  IMPRESSION:  Patchy bilateral upper lobe infiltrates are worsened from the prior study  Redemonstration of distal esophageal/gastric stent within a thickened distal esophagus. Proximal esophagus is dilated and fluid filled  No acute pathology in the abdomen or pelvis    < end of copied text >

## 2020-08-14 NOTE — SWALLOW BEDSIDE ASSESSMENT ADULT - SLP GENERAL OBSERVATIONS
Pt received & seen seated upright via stretcher in ED, awake/alert, oriented, baseline cough, 0/10 pain

## 2020-08-14 NOTE — PROGRESS NOTE ADULT - SUBJECTIVE AND OBJECTIVE BOX
INFECTIOUS DISEASES AND INTERNAL MEDICINE at [place]  =======================================================  Guido Farooq MD  Diplomates American Board of Internal Medicine and Infectious Diseases  Telephone [phone]  Fax            [phone]  =======================================================    EDITH VALENCIA [id number]    Follow up: possible pneumonia fevers    Allergies:  Allergy Status Unknown  no vegetables and no bread (Unknown)  requests tuna salad (Unknown)      Medications:  atorvastatin 10 milliGRAM(s) Oral at bedtime  dextrose 40% Gel 15 Gram(s) Oral once PRN  dextrose 5%. 1000 milliLiter(s) IV Continuous <Continuous>  dextrose 50% Injectable 12.5 Gram(s) IV Push once  dextrose 50% Injectable 25 Gram(s) IV Push once  dextrose 50% Injectable 25 Gram(s) IV Push once  enoxaparin Injectable 30 milliGRAM(s) SubCutaneous daily  glucagon  Injectable 1 milliGRAM(s) IntraMuscular once PRN  insulin lispro (HumaLOG) corrective regimen sliding scale   SubCutaneous three times a day before meals  insulin lispro (HumaLOG) corrective regimen sliding scale   SubCutaneous at bedtime  lactulose Syrup 20 Gram(s) Oral daily PRN  metoprolol succinate ER 50 milliGRAM(s) Oral daily  ondansetron Injectable 4 milliGRAM(s) IV Push every 4 hours PRN  oxyCODONE    IR 10 milliGRAM(s) Oral every 6 hours PRN  pantoprazole    Tablet 40 milliGRAM(s) Oral before breakfast  piperacillin/tazobactam IVPB.. 3.375 Gram(s) IV Intermittent every 8 hours  sodium chloride 0.9%. 1000 milliLiter(s) IV Continuous <Continuous>  sucralfate 1 Gram(s) Oral four times a day PRN    SOCIAL       FAMILY   FAMILY HISTORY:  FHx: throat cancer  FHx: diabetes mellitus    REVIEW OF SYSTEMS:  CONSTITUTIONAL:  No Fever or chills  HEENT:   No diplopia or blurred vision.  No earache, sore throat or runny nose.  CARDIOVASCULAR:  No pressure, squeezing, strangling, tightness, heaviness or aching about the chest, neck, axilla or epigastrium.  RESPIRATORY:  No cough, shortness of breath, PND or orthopnea.  GASTROINTESTINAL:  No nausea, vomiting or diarrhea.  GENITOURINARY:  No dysuria, frequency or urgency. No Blood in urine  MUSCULOSKELETAL:   AS PER HPI  SKIN:  No change in skin, hair or nails.  NEUROLOGIC:  No paresthesias, fasciculations, seizures or weakness.  PSYCHIATRIC:  No disorder of thought or mood.  ENDOCRINE:  No heat or cold intolerance, polyuria or polydipsia.  HEMATOLOGICAL:  No easy bruising or bleeding.            Physical Exam:      afebrile  (C): 36.4 (14 Aug 2020 07:51), Max: 37.1 (13 Aug 2020 23:36)  T(F): 97.6 (14 Aug 2020 07:51), Max: 98.7 (13 Aug 2020 23:36)  HR: 90 (14 Aug 2020 07:51) (90 - 105)  BP: 113/66 (14 Aug 2020 07:51) (91/51 - 113/66)  BP(mean): 78 (13 Aug 2020 15:56) (78 - 78)  RR: 18 (14 Aug 2020 07:51) (15 - 19)  SpO2: 95% (14 Aug 2020 07:51) (94% - 99%)    GEN: NAD,   HEENT: normocephalic and atraumatic. EOMI. KAUSHAL.    NECK: Supple. No carotid bruits.  No lymphadenopathy or thyromegaly.  LUNGS: Clear to auscultation.  HEART: Regular rate and rhythm without murmur.  ABDOMEN: Soft, nontender, and nondistended.  Positive bowel sounds.    : No CVA tenderness  EXTREMITIES: Without any cyanosis, clubbing, rash, lesions or edema.  MSK: no joint swelling  NEUROLOGIC: Cranial nerves II through XII are grossly intact.  PSYCHIATRIC: Appropriate affect .  SKIN: No ulceration or induration present.        Labs:

## 2020-08-14 NOTE — SWALLOW BEDSIDE ASSESSMENT ADULT - COMMENTS
As per MD note: "59y/oM PMH HTN, DM, esophageal CA (s/p esophageal stent 5/28/20) sent from radiology with fever/cough."

## 2020-08-14 NOTE — SWALLOW BEDSIDE ASSESSMENT ADULT - ASR SWALLOW ASPIRATION MONITOR
change of breathing pattern/position upright (89Y)/cough/gurgly voice/upper respiratory infection/oral hygiene/fever/pneumonia/throat clearing

## 2020-08-14 NOTE — SWALLOW BEDSIDE ASSESSMENT ADULT - SLP PERTINENT HISTORY OF CURRENT PROBLEM
Pt known to this service & was seen for bedside swallow eval in July 2020.  A regular diet (pt cut-up own food), thin fluids with intake of 6 small meals daily, was reported at that time (due to esophageal stent)

## 2020-08-15 NOTE — PROGRESS NOTE ADULT - SUBJECTIVE AND OBJECTIVE BOX
EDITH VALENCIA    [id number]    59y      Male    CC: Fever    INTERVAL HPI/OVERNIGHT EVENTS: Pt seen and examined. Feeling well, sitting upright in bed    REVIEW OF SYSTEMS:    CONSTITUTIONAL: No fever, weight loss, or fatigue  RESPIRATORY: No cough, wheezing, hemoptysis; No shortness of breath  CARDIOVASCULAR: No chest pain, palpitations  GASTROINTESTINAL: No abdominal or epigastric pain. No nausea, vomiting  NEUROLOGICAL: No headaches, memory loss, loss of strength.    Vital Signs Last 24 Hrs  T(C): 36.7 (15 Aug 2020 08:02), Max: 36.9 (14 Aug 2020 22:19)  T(F): 98 (15 Aug 2020 08:02), Max: 98.4 (14 Aug 2020 22:19)  HR: 97 (15 Aug 2020 08:02) (97 - 109)  BP: 114/69 (15 Aug 2020 08:02) (114/69 - 126/76)  BP(mean): --  RR: 18 (15 Aug 2020 08:02) (18 - 18)  SpO2: 100% (15 Aug 2020 04:54) (93% - 100%)    PHYSICAL EXAM:    GENERAL: NAD  HEENT: +EOMI  CHEST/LUNG: Clear to auscultation bilaterally  HEART: S1S2+, Regular rate and rhythm  ABDOMEN: Soft, Nontender, Nondistended; Bowel sounds present  SKIN: warm, dry  NEURO: awake, alert, non-focal  PSYCH: normal affect    LABS:                        7.3    6.34  )-----------( 282      ( 15 Aug 2020 08:06 )             23.3     08-15    135  |  97<L>  |  22.0<H>  ----------------------------<  169<H>  4.3   |  26.0  |  1.64<H>    Ca    8.9      15 Aug 2020 08:06  Phos  3.2     08-14  Mg     1.6     08-15    TPro  7.0  /  Alb  3.4  /  TBili  0.2<L>  /  DBili  x   /  AST  11  /  ALT  11  /  AlkPhos  75  08-14      Urinalysis Basic - ( 13 Aug 2020 19:25 )    Color: Yellow / Appearance: Clear / S.005 / pH: x  Gluc: x / Ketone: Negative  / Bili: Negative / Urobili: Negative mg/dL   Blood: x / Protein: 30 mg/dL / Nitrite: Negative   Leuk Esterase: Negative / RBC: 0-2 /HPF / WBC 0-2   Sq Epi: x / Non Sq Epi: Occasional / Bacteria: Few      MEDICATIONS  (STANDING):  atorvastatin 10 milliGRAM(s) Oral at bedtime  dextrose 5%. 1000 milliLiter(s) (50 mL/Hr) IV Continuous <Continuous>  dextrose 50% Injectable 12.5 Gram(s) IV Push once  dextrose 50% Injectable 25 Gram(s) IV Push once  dextrose 50% Injectable 25 Gram(s) IV Push once  enoxaparin Injectable 30 milliGRAM(s) SubCutaneous daily  insulin lispro (HumaLOG) corrective regimen sliding scale   SubCutaneous three times a day before meals  insulin lispro (HumaLOG) corrective regimen sliding scale   SubCutaneous at bedtime  magnesium oxide 400 milliGRAM(s) Oral three times a day with meals  metoprolol succinate ER 50 milliGRAM(s) Oral daily  pantoprazole    Tablet 40 milliGRAM(s) Oral before breakfast  piperacillin/tazobactam IVPB.. 3.375 Gram(s) IV Intermittent every 8 hours  sodium chloride 0.9%. 1000 milliLiter(s) (100 mL/Hr) IV Continuous <Continuous>    MEDICATIONS  (PRN):  dextrose 40% Gel 15 Gram(s) Oral once PRN Blood Glucose LESS THAN 70 milliGRAM(s)/deciliter  glucagon  Injectable 1 milliGRAM(s) IntraMuscular once PRN Glucose LESS THAN 70 milligrams/deciliter  lactulose Syrup 20 Gram(s) Oral daily PRN constipation  ondansetron Injectable 4 milliGRAM(s) IV Push every 4 hours PRN Nausea and/or Vomiting  oxyCODONE    IR 10 milliGRAM(s) Oral every 6 hours PRN Moderate Pain (4 - 6)  sucralfate 1 Gram(s) Oral four times a day PRN gi upset      RADIOLOGY & ADDITIONAL TESTS:

## 2020-08-16 NOTE — PROGRESS NOTE ADULT - SUBJECTIVE AND OBJECTIVE BOX
INFECTIOUS DISEASES AND INTERNAL MEDICINE at [place]  =======================================================  Guido Farooq MD  Diplomates American Board of Internal Medicine and Infectious Diseases  Telephone [phone]  Fax            [phone]  =======================================================    EDITH VALENCIA [id number]    Follow up: PNEUMONIA    Allergies:  Allergy Status Unknown  no vegetables and no bread (Unknown)  requests tuna salad (Unknown)      Medications:  atorvastatin 10 milliGRAM(s) Oral at bedtime  dextrose 40% Gel 15 Gram(s) Oral once PRN  dextrose 5%. 1000 milliLiter(s) IV Continuous <Continuous>  dextrose 50% Injectable 12.5 Gram(s) IV Push once  dextrose 50% Injectable 25 Gram(s) IV Push once  dextrose 50% Injectable 25 Gram(s) IV Push once  enoxaparin Injectable 30 milliGRAM(s) SubCutaneous daily  glucagon  Injectable 1 milliGRAM(s) IntraMuscular once PRN  insulin lispro (HumaLOG) corrective regimen sliding scale   SubCutaneous three times a day before meals  insulin lispro (HumaLOG) corrective regimen sliding scale   SubCutaneous at bedtime  lactulose Syrup 20 Gram(s) Oral daily PRN  metoprolol succinate ER 50 milliGRAM(s) Oral daily  ondansetron Injectable 4 milliGRAM(s) IV Push every 4 hours PRN  oxyCODONE    IR 10 milliGRAM(s) Oral every 6 hours PRN  pantoprazole    Tablet 40 milliGRAM(s) Oral before breakfast  piperacillin/tazobactam IVPB.. 3.375 Gram(s) IV Intermittent every 8 hours  sodium chloride 0.9%. 1000 milliLiter(s) IV Continuous <Continuous>  sucralfate 1 Gram(s) Oral four times a day PRN    SOCIAL       FAMILY   FAMILY HISTORY:  FHx: throat cancer  FHx: diabetes mellitus    REVIEW OF SYSTEMS:  CONSTITUTIONAL:  No Fever or chills  HEENT:   No diplopia or blurred vision.  No earache, sore throat or runny nose.  CARDIOVASCULAR:  No pressure, squeezing, strangling, tightness, heaviness or aching about the chest, neck, axilla or epigastrium.  RESPIRATORY:  No cough, shortness of breath, PND or orthopnea.  GASTROINTESTINAL:  No nausea, vomiting or diarrhea.  GENITOURINARY:  No dysuria, frequency or urgency. No Blood in urine  MUSCULOSKELETAL:   AS PER HPI  SKIN:  No change in skin, hair or nails.  NEUROLOGIC:  No paresthesias, fasciculations, seizures or weakness.  PSYCHIATRIC:  No disorder of thought or mood.  ENDOCRINE:  No heat or cold intolerance, polyuria or polydipsia.  HEMATOLOGICAL:  No easy bruising or bleeding.            Physical Exam:  ICU Vital Signs Last 24 Hrs  T(C): 37.3 (16 Aug 2020 07:50), Max: 37.3 (16 Aug 2020 07:50)  T(F): 99.1 (16 Aug 2020 07:50), Max: 99.1 (16 Aug 2020 07:50)  HR: 99 (16 Aug 2020 07:50) (99 - 103)  BP: 116/76 (16 Aug 2020 07:50) (111/70 - 116/76)  BP(mean): --  ABP: --  ABP(mean): --  RR: 18 (16 Aug 2020 07:50) (18 - 18)  SpO2: 97% (16 Aug 2020 04:55) (97% - 97%)    GEN: NAD,   HEENT: normocephalic and atraumatic. EOMI. KAUSHAL.    NECK: Supple. No carotid bruits.  No lymphadenopathy or thyromegaly.  LUNGS: Clear to auscultation.  HEART: Regular rate and rhythm without murmur.  ABDOMEN: Soft, nontender, and nondistended.  Positive bowel sounds.    : No CVA tenderness  EXTREMITIES: Without any cyanosis, clubbing, rash, lesions or edema.  MSK: no joint swelling  NEUROLOGIC: Cranial nerves II through XII are grossly intact.  PSYCHIATRIC: Appropriate affect .  SKIN: No ulceration or induration present.        Labs:                          8.6    6.39  )-----------( 353      ( 16 Aug 2020 08:08 )             27.4   08-16    138  |  97<L>  |  11.0  ----------------------------<  153<H>  4.1   |  27.0  |  1.36<H>    Ca    9.5      16 Aug 2020 08:08  Mg     1.6     08-16

## 2020-08-16 NOTE — PROCEDURE NOTE - NSPOSTCAREGUIDE_GEN_A_CORE
Instructed patient/caregiver to follow-up with primary care physician/Keep the cast/splint/dressing clean and dry/Verbal/written post procedure instructions were given to patient/caregiver/Instructed patient/caregiver regarding signs and symptoms of infection/Care for catheter as per unit/ICU protocols

## 2020-08-16 NOTE — PROGRESS NOTE ADULT - SUBJECTIVE AND OBJECTIVE BOX
Patient is a 59y old  Male who presents with a chief complaint of     INTERVAL HPI/OVERNIGHT EVENTS: seen and examined. No new complains. No fever     MEDICATIONS  (STANDING):  atorvastatin 10 milliGRAM(s) Oral at bedtime  dextrose 5%. 1000 milliLiter(s) (50 mL/Hr) IV Continuous <Continuous>  dextrose 50% Injectable 12.5 Gram(s) IV Push once  dextrose 50% Injectable 25 Gram(s) IV Push once  dextrose 50% Injectable 25 Gram(s) IV Push once  enoxaparin Injectable 30 milliGRAM(s) SubCutaneous daily  insulin lispro (HumaLOG) corrective regimen sliding scale   SubCutaneous three times a day before meals  insulin lispro (HumaLOG) corrective regimen sliding scale   SubCutaneous at bedtime  metoprolol succinate ER 50 milliGRAM(s) Oral daily  pantoprazole    Tablet 40 milliGRAM(s) Oral before breakfast  piperacillin/tazobactam IVPB.. 3.375 Gram(s) IV Intermittent every 8 hours  sodium chloride 0.9%. 1000 milliLiter(s) (100 mL/Hr) IV Continuous <Continuous>    MEDICATIONS  (PRN):  dextrose 40% Gel 15 Gram(s) Oral once PRN Blood Glucose LESS THAN 70 milliGRAM(s)/deciliter  glucagon  Injectable 1 milliGRAM(s) IntraMuscular once PRN Glucose LESS THAN 70 milligrams/deciliter  lactulose Syrup 20 Gram(s) Oral daily PRN constipation  ondansetron Injectable 4 milliGRAM(s) IV Push every 4 hours PRN Nausea and/or Vomiting  oxyCODONE    IR 10 milliGRAM(s) Oral every 6 hours PRN Moderate Pain (4 - 6)  sucralfate 1 Gram(s) Oral four times a day PRN gi upset      Allergies    Allergy Status Unknown    Intolerances    no vegetables and no bread (Unknown)  requests tuna salad (Unknown)      REVIEW OF SYSTEMS:  CONSTITUTIONAL: No fever, weight loss, or fatigue  RESPIRATORY: No cough, wheezing, chills or hemoptysis; No shortness of breath  CARDIOVASCULAR: No chest pain, palpitations, dizziness, or leg swelling  GASTROINTESTINAL: No abdominal or epigastric pain. No nausea, vomiting, or hematemesis; No diarrhea or constipation. No melena or hematochezia.  NEUROLOGICAL: No headaches, memory loss, loss of strength, numbness, or tremors  MUSCULOSKELETAL: No joint pain or swelling; No muscle, back, or extremity pain      Vital Signs Last 24 Hrs  T(C): 37.3 (16 Aug 2020 07:50), Max: 37.3 (16 Aug 2020 07:50)  T(F): 99.1 (16 Aug 2020 07:50), Max: 99.1 (16 Aug 2020 07:50)  HR: 99 (16 Aug 2020 07:50) (99 - 103)  BP: 116/76 (16 Aug 2020 07:50) (111/70 - 121/72)  BP(mean): --  RR: 18 (16 Aug 2020 07:50) (18 - 18)  SpO2: 97% (16 Aug 2020 04:55) (95% - 97%)    PHYSICAL EXAM:  GENERAL: NAD, well-groomed, well-developed  HEAD:  Atraumatic, Normocephalic  EYES: EOMI, PERRLA, conjunctiva and sclera clear  NECK: Supple, No JVD, Normal thyroid  NERVOUS SYSTEM:  Alert & Oriented X3, No gross focal deficits  CHEST/LUNG: Clear to percussion bilaterally; No rales, rhonchi, wheezing, or rubs  HEART: Regular rate and rhythm; No murmurs, rubs, or gallops  ABDOMEN: Soft, Nontender, Nondistended; Bowel sounds present  EXTREMITIES:  No clubbing, cyanosis, or edema  SKIN: No rashes or lesions    LABS:                        8.6    6.39  )-----------( 353      ( 16 Aug 2020 08:08 )             27.4     08-16    138  |  97<L>  |  11.0  ----------------------------<  153<H>  4.1   |  27.0  |  1.36<H>    Ca    9.5      16 Aug 2020 08:08  Mg     1.6     08-16          CAPILLARY BLOOD GLUCOSE      POCT Blood Glucose.: 213 mg/dL (16 Aug 2020 12:02)  POCT Blood Glucose.: 168 mg/dL (16 Aug 2020 08:02)  POCT Blood Glucose.: 198 mg/dL (15 Aug 2020 21:07)  POCT Blood Glucose.: 131 mg/dL (15 Aug 2020 16:08)      RADIOLOGY & ADDITIONAL TESTS:    Imaging Personally Reviewed:  [ ] YES  [ ] NO    Consultant(s) Notes Reviewed:  [ ] YES  [ ] NO    Care Discussed with Consultants/Other Providers [ ] YES  [ ] NO    Plan of Care discussed with Housestaff [ ]YES [ ] NO

## 2020-08-17 NOTE — PROGRESS NOTE ADULT - SUBJECTIVE AND OBJECTIVE BOX
Patient is a 59y old  Male who presents with a chief complaint of     INTERVAL HPI/OVERNIGHT EVENTS: seen and examined. No new complains.     MEDICATIONS  (STANDING):  atorvastatin 10 milliGRAM(s) Oral at bedtime  chlorhexidine 4% Liquid 1 Application(s) Topical <User Schedule>  dextrose 5%. 1000 milliLiter(s) (50 mL/Hr) IV Continuous <Continuous>  dextrose 50% Injectable 12.5 Gram(s) IV Push once  dextrose 50% Injectable 25 Gram(s) IV Push once  dextrose 50% Injectable 25 Gram(s) IV Push once  enoxaparin Injectable 30 milliGRAM(s) SubCutaneous daily  insulin lispro (HumaLOG) corrective regimen sliding scale   SubCutaneous three times a day before meals  insulin lispro (HumaLOG) corrective regimen sliding scale   SubCutaneous at bedtime  metoprolol succinate ER 50 milliGRAM(s) Oral daily  pantoprazole    Tablet 40 milliGRAM(s) Oral before breakfast  piperacillin/tazobactam IVPB.. 3.375 Gram(s) IV Intermittent every 8 hours  sodium chloride 0.9%. 1000 milliLiter(s) (100 mL/Hr) IV Continuous <Continuous>    MEDICATIONS  (PRN):  dextrose 40% Gel 15 Gram(s) Oral once PRN Blood Glucose LESS THAN 70 milliGRAM(s)/deciliter  glucagon  Injectable 1 milliGRAM(s) IntraMuscular once PRN Glucose LESS THAN 70 milligrams/deciliter  lactulose Syrup 20 Gram(s) Oral daily PRN constipation  ondansetron Injectable 4 milliGRAM(s) IV Push every 4 hours PRN Nausea and/or Vomiting  oxyCODONE    IR 10 milliGRAM(s) Oral every 6 hours PRN Moderate Pain (4 - 6)  sodium chloride 0.9% lock flush 10 milliLiter(s) IV Push every 1 hour PRN Pre/post blood products, medications, blood draw, and to maintain line patency  sucralfate 1 Gram(s) Oral four times a day PRN gi upset      Allergies    Allergy Status Unknown    Intolerances    no vegetables and no bread (Unknown)  requests tuna salad (Unknown)      REVIEW OF SYSTEMS:  CONSTITUTIONAL: No fever, weight loss, or fatigue  RESPIRATORY: No cough, wheezing, chills or hemoptysis; No shortness of breath  CARDIOVASCULAR: No chest pain, palpitations, dizziness, or leg swelling  GASTROINTESTINAL: No abdominal or epigastric pain. No nausea, vomiting, or hematemesis; No diarrhea or constipation. No melena or hematochezia.  NEUROLOGICAL: No headaches, memory loss, loss of strength, numbness, or tremors  MUSCULOSKELETAL: No joint pain or swelling; No muscle, back, or extremity pain      Vital Signs Last 24 Hrs  T(C): 36.8 (17 Aug 2020 07:12), Max: 36.9 (16 Aug 2020 21:58)  T(F): 98.2 (17 Aug 2020 07:12), Max: 98.5 (16 Aug 2020 21:58)  HR: 86 (17 Aug 2020 07:41) (86 - 121)  BP: 127/79 (17 Aug 2020 07:12) (106/70 - 127/79)  BP(mean): --  RR: 19 (17 Aug 2020 07:12) (18 - 19)  SpO2: 97% (17 Aug 2020 07:12) (97% - 100%)    PHYSICAL EXAM:  GENERAL: NAD, well-groomed, well-developed  HEAD:  Atraumatic, Normocephalic  EYES: EOMI, PERRLA, conjunctiva and sclera clear  NECK: Supple, No JVD, Normal thyroid  NERVOUS SYSTEM:  Alert & Oriented X3, No gross focal deficits  CHEST/LUNG: Clear to percussion bilaterally; No rales, rhonchi, wheezing, or rubs  HEART: Regular rate and rhythm; No murmurs, rubs, or gallops  ABDOMEN: Soft, Nontender, Nondistended; Bowel sounds present  EXTREMITIES:  No clubbing, cyanosis, or edema  SKIN: No rashes or lesions    LABS:                        8.5    6.77  )-----------( 327      ( 17 Aug 2020 08:44 )             26.6     08-17    137  |  98  |  12.0  ----------------------------<  171<H>  3.9   |  27.0  |  1.40<H>    Ca    9.6      17 Aug 2020 08:44  Mg     1.6     08-16          CAPILLARY BLOOD GLUCOSE      POCT Blood Glucose.: 194 mg/dL (17 Aug 2020 12:18)  POCT Blood Glucose.: 163 mg/dL (17 Aug 2020 08:06)  POCT Blood Glucose.: 185 mg/dL (16 Aug 2020 20:52)  POCT Blood Glucose.: 226 mg/dL (16 Aug 2020 16:29)      RADIOLOGY & ADDITIONAL TESTS:    Imaging Personally Reviewed:  [ ] YES  [ ] NO    Consultant(s) Notes Reviewed:  [ ] YES  [ ] NO    Care Discussed with Consultants/Other Providers [ ] YES  [ ] NO    Plan of Care discussed with Housestaff [ ]YES [ ] NO

## 2020-08-17 NOTE — PROCEDURE NOTE - NSPOSTPRCRAD_GEN_A_CORE
40 cm total length.  tip distal svc/line in appropriate postion/fluoroscopy/line adjusted to depth of insertion

## 2020-08-17 NOTE — PROCEDURE NOTE - NSPROCDETAILS_GEN_ALL_CORE
ultrasound assessment/ultrasound guidance/sterile technique, catheter placed/location identified, draped/prepped, sterile technique used/sterile dressing applied/supine position
secured in place/dressing applied/flushes easily/sterile technique, catheter placed/location identified, draped/prepped, sterile technique used/blood seen on insertion

## 2020-08-18 NOTE — PROGRESS NOTE ADULT - ASSESSMENT
IR PRE-PROCEDURE NOTE  DIAGNOSIS: esophageal ca  PROCEDURE: 2 lumen Picc placement   RECENT CHANGES: None  PERTINENT LABS: Within acceptable limits.  IMAGING: Reviewed  CONSENT: On chart    no port at this time due to intermittent fevers.    acfwebrile > 24 hrs (still on abx)  will place 2 lumen picc for now (discussed with dr jordan)
59M pt with hx of DM, HTN, and esophageal ca (esophageal stent placed 5/28/2020) was on chemo ~1 month ago, none currently, sent from radiology for fever/cough. Pt was going for biopsy and port placement for chemo, had fever to 102. Pt states had coughing last night with some vomiting. Similar to prior presentations of pna. PT WITH RECENT ADMISSION TO [facility]  FOR FEVERS AND WAS ON IV CEFEPIME AND DISCHARGED HOME ON ORAL ABX   . Has some mild chronic abd discomfort which he states he always has, unchanged, hasn't taken his oxy today. No diarrhea or any other symptoms, no meningeal symptoms, no cp/sob or dizziness.   PT AWAKE ALERT NON TOXIC FAMILY MEMBER AT BEDSIDE  HAS GOTTEN FLUIDS IN ER AND FEELS BETTER  CXR NEG      BLOOD CX X2 SETS  NEG   HAS  BEEN ON EMPIRIC  ZOSYN SINCE ADMISSION 8/13  CX NEG  SUGGEST PROCEED WITH PORT PLACEMENT AND BX DURING THIS HOSPITAL STAY   CLEAR FROM ID STANDPOINT  D/W HOSPITALIST
59M pt with hx of DM, HTN, and esophageal ca (esophageal stent placed 5/28/2020) was on chemo ~1 month ago, none currently, sent from radiology for fever/cough. Pt was going for biopsy and port placement for chemo, had fever to 102. Pt states had coughing last night with some vomiting. Similar to prior presentations of pna. PT WITH RECENT ADMISSION TO [facility]  FOR FEVERS AND WAS ON IV CEFEPIME AND DISCHARGED HOME ON ORAL ABX   . Has some mild chronic abd discomfort which he states he always has, unchanged, hasn't taken his oxy today. No diarrhea or any other symptoms, no meningeal symptoms, no cp/sob or dizziness.   PT AWAKE ALERT NON TOXIC FEELS BETTER  CXR NEG      BLOOD CX X2 SETS  NEG   HAS  BEEN ON EMPIRIC  ZOSYN SINCE ADMISSION 8/13  CX NEG  PICC  PLACEMENT DONE YESTERDAY   D/W ONCOLOGY WOULD  SUGGGEST  BX DURING THIS HOSPITAL STAY   CLEAR FROM ID STANDPOINT  D/W HOSPITALIST POSSIBLE D/C HOME LATER TODAY  HAS COMPLETED IV ABX
59M pt with hx of DM, HTN, and esophageal ca (esophageal stent placed 5/28/2020) was on chemo ~1 month ago, none currently, sent from radiology for fever/cough. Pt was going for biopsy and port placement for chemo, had fever to 102. Pt states had coughing last night with some vomiting. Similar to prior presentations of pna. PT WITH RECENT ADMISSION TO [facility]  FOR FEVERS AND WAS ON IV CEFEPIME AND DISCHARGED HOME ON ORAL ABX   . Has some mild chronic abd discomfort which he states he always has, unchanged, hasn't taken his oxy today. No diarrhea or any other symptoms, no meningeal symptoms, no cp/sob or dizziness.   PT AWAKE ALERT NON TOXIC FAMILY MEMBER AT BEDSIDE  HAS GOTTEN FLUIDS IN ER AND FEELS BETTER  CXR NEG    FEELS BETTER STILL IN ER   BLOOD CX X2 SETS   pending  CONTINUE  EMPIRIC  ZOSYN    WILL FOLLOW UP
59y/oM PMH HTN, DM, esophageal CA (s/p esophageal stent 5/28/20) sent from radiology with fever/cough.     1. Fever on admission. No fever. No WBC'.   BC and UC no growth   CXR; clear   CT chest and abdomen is patchy infiltrate,   Currently on Zosyn being it treated for PNA     2. Esophageal adenocarcinoma  -planned for port placement for chemo and biopsy  Will speak to IR on Monday     3. ISIDRO -likely 2/2 dehydration. Improving on IV fluids     4. HTN/HLD - BP controlled   -hold lisinopril in setting of isidro  -cont metoprolol with holding parameters   -cont statin    5. Type 2 DM. HBA1C 6. BG controlled on Humalog per SS   -on metformin at home     6. Macrocytic anemia - likely multifactorial  Will check anemia panel   7. DVT prophylaxis
59y/oM PMH HTN, DM, esophageal CA (s/p esophageal stent 5/28/20) sent from radiology with fever/cough.     1. Fever on admission. No fever. No WBC'.   BC and UC no growth   CXR; clear   CT chest and abdomen is patchy infiltrate,   Currently on Zosyn being it treated for PNA     2. Esophageal adenocarcinoma  Spoke to IR - Dr. Cook, plan to put PICC today     3. ISIDRO -likely 2/2 dehydration. Improving on IV fluids     4. HTN/HLD - BP controlled   -hold lisinopril in setting of isidro  -cont metoprolol with holding parameters   -cont statin    5. Type 2 DM. HBA1C 6. BG controlled on Humalog per SS   -on metformin at home     6. Macrocytic anemia - likely multifactorial  Will check anemia panel   7. DVT prophylaxis
59y/oM PMH HTN, DM, esophageal CA (s/p esophageal stent 5/28/20) sent from radiology with fever/cough.     Fever  Sepsis on admission  -s/p IVF, azithromycin, zosyn, vanc, cefepime in ER  -f/u blood cx   -CXR neg  -ID input appreciated  -cont zosyn  -increased b/l infiltrates on CT chest, no acute pathology abd/pelvis       Esophageal adenocarcinoma  -planned for port placement, outpt chemo when optimized  -heme/onc input appreciated  -cont oxycodone PRN  -PRN anti-emetics     ISIDRO  -likely 2/2 dehydration, trend BMP    HTN/HLD  -hold lisinopril in setting of isidro  -cont metoprolol with holding parameters   -cont statin    DM  -on metformin, hold while here  -cont ISS
59y/oM PMH HTN, DM, esophageal CA (s/p esophageal stent 5/28/20) sent from radiology with fever/cough.     Fever  Sepsis on admission, resolved  -s/p IVF, azithromycin, zosyn, vanc, cefepime in ER  -f/u blood cx   -CXR neg  -ID input appreciated  -cont zosyn  -increased b/l infiltrates on CT chest, no acute pathology abd/pelvis       Esophageal adenocarcinoma  -planned for port placement for chemo and biopsy, when optimized  -heme/onc input appreciated  -cont oxycodone PRN  -PRN anti-emetics   -swallow eval: mechanical soft with thin fluids    ISIDRO  -likely 2/2 dehydration, trend BMP    HTN/HLD  -hold lisinopril in setting of isidro  -cont metoprolol with holding parameters   -cont statin    DM  -on metformin, hold while here  -cont ISS

## 2020-08-18 NOTE — DISCHARGE NOTE PROVIDER - HOSPITAL COURSE
59M pt with hx of DM, HTN, and esophageal ca (esophageal stent placed 5/28/2020) was on chemo ~1 month ago, none currently, sent from radiology for fever/cough. Pt was going for biopsy and port placement for chemo, had fever to 102. BLOOD CX X2 SETS  NEG. CXR negative.     HAS  BEEN ON EMPIRIC  ZOSYN SINCE ADMISSION 8/13. PICC  PLACEMENT DONE YESTERDAY in IR    Oncology suggested bx during this stay. Pt. had bx performed in IR of gastro-hepatic lymph nodes.            ROS:     Has some mild back discomfort and mild abd.pain which he states is unchanged, hasn't taken his oxy today which helps out.    Denies chills/fever, SOB, CP, palpitations, diaphoresis, N/V        Vital Signs Last 24 Hrs    T(C): 36.8 (18 Aug 2020 07:21), Max: 37.1 (17 Aug 2020 15:35)    T(F): 98.2 (18 Aug 2020 07:21), Max: 98.7 (17 Aug 2020 15:35)    HR: 95 (18 Aug 2020 07:55) (92 - 116)    BP: 122/80 (18 Aug 2020 07:21) (112/69 - 122/80)    RR: 18 (18 Aug 2020 07:21) (18 - 19)    SpO2: 99% (18 Aug 2020 07:21) (99% - 99%)        Fever on admission. No fever. No WBC'.     BC and UC no growth     CXR; clear     CT chest and abdomen is patchy infiltrate,     Currently on Zosyn being it treated for PNA         2. Esophageal adenocarcinoma    Spoke to IR - Dr. Cook, plan to put PICC today         3. ISIDRO -likely 2/2 dehydration. Improving on IV fluids         4. HTN/HLD - BP controlled     -hold lisinopril in setting of isidro    -cont metoprolol with holding parameters     -cont statin        5. Type 2 DM. HBA1C 6. BG controlled on Humalog per SS     -on metformin at home         6. Macrocytic anemia - likely multifactorial    Will check anemia panel     7. DVT prophylaxis

## 2020-08-18 NOTE — DISCHARGE NOTE NURSING/CASE MANAGEMENT/SOCIAL WORK - PATIENT PORTAL LINK FT
You can access the FollowMyHealth Patient Portal offered by [facility] by registering at the following website: [email or web address]. By joining [email or web address] FollowMyHealth portal, you will also be able to view your health information using other applications (apps) compatible with our system.

## 2020-08-18 NOTE — DISCHARGE NOTE PROVIDER - NSDCCPTREATMENT_GEN_ALL_CORE_FT
PRINCIPAL PROCEDURE  Procedure: Placement, PICC, with image guidance  Findings and Treatment:       SECONDARY PROCEDURE  Procedure: Biopsy, lymph node, open  Findings and Treatment:

## 2020-08-18 NOTE — PROGRESS NOTE ADULT - SUBJECTIVE AND OBJECTIVE BOX
INFECTIOUS DISEASES AND INTERNAL MEDICINE at [place]  =======================================================  Guido Farooq MD  Diplomates American Board of Internal Medicine and Infectious Diseases  Telephone [phone]  Fax            [phone]  =======================================================    EDITH VALENCIA [id number]    Follow up: PNEUMONIA    Allergies:  Allergy Status Unknown  no vegetables and no bread (Unknown)  requests tuna salad (Unknown)      Medications:  atorvastatin 10 milliGRAM(s) Oral at bedtime  dextrose 40% Gel 15 Gram(s) Oral once PRN  dextrose 5%. 1000 milliLiter(s) IV Continuous <Continuous>  dextrose 50% Injectable 12.5 Gram(s) IV Push once  dextrose 50% Injectable 25 Gram(s) IV Push once  dextrose 50% Injectable 25 Gram(s) IV Push once  enoxaparin Injectable 30 milliGRAM(s) SubCutaneous daily  glucagon  Injectable 1 milliGRAM(s) IntraMuscular once PRN  insulin lispro (HumaLOG) corrective regimen sliding scale   SubCutaneous three times a day before meals  insulin lispro (HumaLOG) corrective regimen sliding scale   SubCutaneous at bedtime  lactulose Syrup 20 Gram(s) Oral daily PRN  metoprolol succinate ER 50 milliGRAM(s) Oral daily  ondansetron Injectable 4 milliGRAM(s) IV Push every 4 hours PRN  oxyCODONE    IR 10 milliGRAM(s) Oral every 6 hours PRN  pantoprazole    Tablet 40 milliGRAM(s) Oral before breakfast  piperacillin/tazobactam IVPB.. 3.375 Gram(s) IV Intermittent every 8 hours  sodium chloride 0.9%. 1000 milliLiter(s) IV Continuous <Continuous>  sucralfate 1 Gram(s) Oral four times a day PRN    SOCIAL       FAMILY   FAMILY HISTORY:  FHx: throat cancer  FHx: diabetes mellitus    REVIEW OF SYSTEMS:  CONSTITUTIONAL:  No Fever or chills  HEENT:   No diplopia or blurred vision.  No earache, sore throat or runny nose.  CARDIOVASCULAR:  No pressure, squeezing, strangling, tightness, heaviness or aching about the chest, neck, axilla or epigastrium.  RESPIRATORY:  No cough, shortness of breath, PND or orthopnea.  GASTROINTESTINAL:  No nausea, vomiting or diarrhea.  GENITOURINARY:  No dysuria, frequency or urgency. No Blood in urine  MUSCULOSKELETAL:   AS PER HPI  SKIN:  No change in skin, hair or nails.  NEUROLOGIC:  No paresthesias, fasciculations, seizures or weakness.  PSYCHIATRIC:  No disorder of thought or mood.  ENDOCRINE:  No heat or cold intolerance, polyuria or polydipsia.  HEMATOLOGICAL:  No easy bruising or bleeding.            Physical Exam:  I Vital Signs Last 24 Hrs  T(C): 36.8 (18 Aug 2020 07:21), Max: 37.1 (17 Aug 2020 15:35)  T(F): 98.2 (18 Aug 2020 07:21), Max: 98.7 (17 Aug 2020 15:35)  HR: 95 (18 Aug 2020 07:55) (90 - 116)  BP: 122/80 (18 Aug 2020 07:21) (112/69 - 122/80)  BP(mean): --  RR: 18 (18 Aug 2020 07:21) (18 - 19)  SpO2: 99% (18 Aug 2020 07:21) (97% - 99%)    GEN: NAD,   HEENT: normocephalic and atraumatic. EOMI. KAUSHAL.    NECK: Supple. No carotid bruits.  No lymphadenopathy or thyromegaly.  LUNGS: Clear to auscultation.  HEART: Regular rate and rhythm without murmur.  ABDOMEN: Soft, nontender, and nondistended.  Positive bowel sounds.    : No CVA tenderness  EXTREMITIES: Without any cyanosis, clubbing, rash, lesions or edema.  MSK: no joint swelling  NEUROLOGIC: Cranial nerves II through XII are grossly intact.  PSYCHIATRIC: Appropriate affect .  SKIN: No ulceration or induration present.        Labs:                                    8.8    7.22  )-----------( 307      ( 18 Aug 2020 07:29 )             27.6   08-18    137  |  99  |  16.0  ----------------------------<  182<H>  4.1   |  25.0  |  1.47<H>    Ca    9.7      18 Aug 2020 07:30

## 2020-08-18 NOTE — DISCHARGE NOTE PROVIDER - NSDCMRMEDTOKEN_GEN_ALL_CORE_FT
atorvastatin 10 mg oral tablet: 1 tab(s) orally once a day (at bedtime)  lactulose 10 g/15 mL oral syrup: 30 milliliter(s) orally once a day, As Needed constipation  lisinopril 2.5 mg oral tablet: 1 tab(s) orally once a day in morning  magnesium oxide 400 mg (241.3 mg elemental magnesium) oral tablet: 1 tab(s) orally 3 times a day  metFORMIN 1000 mg oral tablet: 1 tab(s) orally 2 times a day  Metoprolol Succinate ER 50 mg oral tablet, extended release: 1 tab(s) orally once a day  ondansetron 8 mg oral tablet, disintegratin tab(s) orally every 8 hours, As Needed - for nausea  oxyCODONE 10 mg oral tablet: 1 tab(s) orally every 6 hours, As Needed  pantoprazole 20 mg oral delayed release tablet: 1 tab(s) orally 2 times a day  prochlorperazine 10 mg oral tablet: 1 tab(s) orally every 6 hours, As Needed - for nausea  sucralfate 1 g oral tablet: 1 tab(s) orally 2 times a day, As Needed for GI ulcers atorvastatin 10 mg oral tablet: 1 tab(s) orally once a day (at bedtime)  lactulose 10 g/15 mL oral syrup: 30 milliliter(s) orally once a day, As Needed constipation  lisinopril 2.5 mg oral tablet: 1 tab(s) orally once a day in morning  metFORMIN 1000 mg oral tablet: 1 tab(s) orally 2 times a day  Metoprolol Succinate ER 50 mg oral tablet, extended release: 1 tab(s) orally once a day  ondansetron 8 mg oral tablet, disintegratin tab(s) orally every 8 hours, As Needed - for nausea  oxyCODONE 10 mg oral tablet: 1 tab(s) orally every 6 hours, As Needed  pantoprazole 20 mg oral delayed release tablet: 1 tab(s) orally 2 times a day  prochlorperazine 10 mg oral tablet: 1 tab(s) orally every 6 hours, As Needed - for nausea  sucralfate 1 g oral tablet: 1 tab(s) orally 2 times a day, As Needed for GI ulcers

## 2020-08-18 NOTE — DISCHARGE NOTE PROVIDER - NSDCCPCAREPLAN_GEN_ALL_CORE_FT
PRINCIPAL DISCHARGE DIAGNOSIS  Diagnosis: Sepsis  Assessment and Plan of Treatment: on admission, resolved      SECONDARY DISCHARGE DIAGNOSES  Diagnosis: Esophageal cancer  Assessment and Plan of Treatment: Lymph node biopsy done. Follow with your oncologist    Diagnosis: Fever  Assessment and Plan of Treatment: No clear source of fever was identified. Was treated for presumed PNA.

## 2020-08-21 NOTE — H&P ADULT - ASSESSMENT
58 yo M with pmh DM2, HTN, HLD, Esophageal CA on chemo recently admitted for PICC line placement for chemo who states he developed fever yesterday and this morning represented with fever of 102.3 as well as cough with sputum production admitted with sepsis sec to aspiration pna and suspected occluded esophageal stent, acute respiratory failure with hypoxia and acute on chronic kidney disease stage 3

## 2020-08-21 NOTE — ED PROVIDER NOTE - ATTENDING CONTRIBUTION TO CARE
seen with resident:  patient 58 yo esophageal cancer, recent admitted with fever and pneumonia; returns now with fever and cough, noted to be hypoxic at triage, responding well to IVF and supplemental oxygen; HR and BP have improved with IVF; patient with crackls b/l lungs; abd softly distended, non tender; imaging concerning for dilatation of esophagus and stomach with potential for aspiration. I discussed case GI who will consult as well as hospitalist for admission

## 2020-08-21 NOTE — H&P ADULT - HISTORY OF PRESENT ILLNESS
58 yo M with pmh DM2, HTN, HLD, Esophageal CA on chemo recently admitted for PICC line placement for chemo who states he developed fever yesterday and this morning represented with fever of 102.3 as well as cough with sputum production, also worsening hoarseness of voice and losing voice as well. Denies any other acute complaints.     In the ED pt was noted to have sepsis likely sec to asp pna, he received vanco and cefepime, pt also was seen by GI who recommended: npo, iv antibx, esophagram and if stent occluded egd monday for removal and replacement

## 2020-08-21 NOTE — ED ADULT TRIAGE NOTE - HEIGHT IN FEET
5 pt is an 80 y/o female accompanied by mother with c/o headache and generalized weakness with vomiting. pt alert oriented x3 follow simple instruction and answer questioon readliy. pt seen and evaluated by MD

## 2020-08-21 NOTE — ED PROVIDER NOTE - CLINICAL SUMMARY MEDICAL DECISION MAKING FREE TEXT BOX
60 y/o M pt with hx of htn, hld, dm, esophageal ca currently on chemo presenting as a sepsis. Suspect pulmonary cause. Will start fluids, abx, tylenol, labs, cxr, nc and reassess

## 2020-08-21 NOTE — ED ADULT NURSE NOTE - NSIMPLEMENTINTERV_GEN_ALL_ED
Implemented All Fall Risk Interventions:  [place] to call system. Call bell, personal items and telephone within reach. Instruct patient to call for assistance. Room bathroom lighting operational. Non-slip footwear when patient is off stretcher. Physically safe environment: no spills, clutter or unnecessary equipment. Stretcher in lowest position, wheels locked, appropriate side rails in place. Provide visual cue, wrist band, yellow gown, etc. Monitor gait and stability. Monitor for mental status changes and reorient to person, place, and time. Review medications for side effects contributing to fall risk. Reinforce activity limits and safety measures with patient and family.

## 2020-08-21 NOTE — H&P ADULT - NSHPSOURCEINFOTX_GEN_ALL_CORE
HCP/Mother/Skylar Figueroa ph#[phone] gives permission to speak to mother and share medical updates

## 2020-08-21 NOTE — H&P ADULT - NSICDXFAMILYHX_GEN_ALL_CORE_FT
FAMILY HISTORY:  FHx: diabetes mellitus, maternal grandmother  FHx: throat cancer, father, former heavy smoker

## 2020-08-21 NOTE — CONSULT NOTE ADULT - SUBJECTIVE AND OBJECTIVE BOX
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 Denies any other acute complaints.     In the ED pt was noted to have sepsis likely sec to asp pna, he received vanco and cefepime, pt also was seen by GI who recommended: npo, iv antibx, esophagram and if stent occluded egd monday for removal and replacement.          REVIEW OF SYSTEMS:  Constitutional, Eyes, ENT, Cardiovascular, Respiratory, Gastrointestinal, Genitourinary, Musculoskeletal, Integumentary, Neurological, Psychiatric, Endocrine, Heme/Lymph, and Allergic/Immunologic review of systems are otherwise negative except as noted in the HPI.    PAST MEDICAL & SURGICAL HISTORY:  S/P PICC central line placement  HTN (hypertension)  HLD (hyperlipidemia)  History of gastroesophageal reflux (GERD)  Esophageal cancer: stent placement May 2020  Diabetes: type 2  H/O colonoscopy  History of cancer chemotherapy      FAMILY HISTORY:  FHx: throat cancer: father, former heavy smoker  FHx: diabetes mellitus: maternal grandmother      SOCIAL HISTORY:  non-smoker    Allergies    Allergy Status Unknown    Intolerances    no vegetables and no bread (Unknown)  requests tuna salad (Unknown)      MEDICATIONS  (STANDING):  atorvastatin 10 milliGRAM(s) Oral at bedtime  dextrose 5% + sodium chloride 0.9%. 1000 milliLiter(s) (125 mL/Hr) IV Continuous <Continuous>  dextrose 5%. 1000 milliLiter(s) (50 mL/Hr) IV Continuous <Continuous>  dextrose 50% Injectable 12.5 Gram(s) IV Push once  dextrose 50% Injectable 25 Gram(s) IV Push once  dextrose 50% Injectable 25 Gram(s) IV Push once  enoxaparin Injectable 40 milliGRAM(s) SubCutaneous daily  insulin lispro (HumaLOG) corrective regimen sliding scale   SubCutaneous every 6 hours  metoprolol succinate ER 50 milliGRAM(s) Oral daily  pantoprazole    Tablet 40 milliGRAM(s) Oral before breakfast  piperacillin/tazobactam IVPB. 3.375 Gram(s) IV Intermittent once  piperacillin/tazobactam IVPB.. 3.375 Gram(s) IV Intermittent every 8 hours  sucralfate 1 Gram(s) Oral two times a day  vancomycin  IVPB 1000 milliGRAM(s) IV Intermittent every 24 hours    MEDICATIONS  (PRN):  dextrose 40% Gel 15 Gram(s) Oral once PRN Blood Glucose LESS THAN 70 milliGRAM(s)/deciliter  glucagon  Injectable 1 milliGRAM(s) IntraMuscular once PRN Glucose LESS THAN 70 milligrams/deciliter  lactulose Syrup 20 Gram(s) Oral daily PRN constipation  ondansetron   Disintegrating Tablet 8 milliGRAM(s) Oral every 8 hours PRN for nausea  oxyCODONE    IR 10 milliGRAM(s) Oral every 6 hours PRN Severe Pain (7 - 10)  prochlorperazine   Tablet 10 milliGRAM(s) Oral every 6 hours PRN for nausea      Vital Signs Last 24 Hrs  T(C): 37.1 (21 Aug 2020 15:00), Max: 38.5 (21 Aug 2020 12:30)  T(F): 98.7 (21 Aug 2020 15:00), Max: 101.3 (21 Aug 2020 12:30)  HR: 111 (21 Aug 2020 15:00) (111 - 146)  BP: 111/57 (21 Aug 2020 15:00) (88/51 - 123/58)  BP(mean): --  RR: 21 (21 Aug 2020 15:00) (21 - 26)  SpO2: 98% (21 Aug 2020 15:00) (87% - 100%)    PHYSICAL EXAM:    GENERAL: ill appearing  HEAD:  Atraumatic, Normocephalic  EYES: EOMI, PERRLA,   NECK: Supple,   NERVOUS SYSTEM:  Alert & Oriented X3,   CHEST/LUNG: good air entry b/l  HEART: tachycardic  ABDOMEN: Soft, Nontender,   EXTREMITIES:  no edema  LYMPH: No lymphadenopathy noted  SKIN: No rashes or lesions      LABS:                        9.3    14.82 )-----------( 290      ( 21 Aug 2020 09:47 )             29.7     08-21    128<L>  |  89<L>  |  40.0<H>  ----------------------------<  218<H>  5.0   |  19.0<L>  |  1.86<H>    Ca    10.1      21 Aug 2020 09:47    TPro  8.2  /  Alb  3.8  /  TBili  0.3<L>  /  DBili  x   /  AST  25  /  ALT  17  /  AlkPhos  101  08-21    PT/INR - ( 21 Aug 2020 09:47 )   PT: 12.3 sec;   INR: 1.06 ratio         PTT - ( 21 Aug 2020 09:47 )  PTT:26.8 sec  Urinalysis Basic - ( 21 Aug 2020 18:00 )    Color: Yellow / Appearance: Slightly Turbid / S.010 / pH: x  Gluc: x / Ketone: Negative  / Bili: Negative / Urobili: Negative mg/dL   Blood: x / Protein: 30 mg/dL / Nitrite: Negative   Leuk Esterase: Negative / RBC: Negative /HPF / WBC 0-2   Sq Epi: x / Non Sq Epi: Occasional / Bacteria: Few          RADIOLOGY & ADDITIONAL STUDIES:  < from: CT Chest No Cont (20 @ 11:33) >  IMPRESSION:  Multifocal pneumonia, worsened since 2000.    Fluid-filled dilatation of the esophagus, which may put the patient at risk for aspiration.    Distal esophageal mass compatible with known history of esophageal cancer.    Metastatic retroperitoneal lymphadenopathy, partially imaged.    Fluid-filled dilatation of the stomach.      < end of copied text >

## 2020-08-21 NOTE — H&P ADULT - NSICDXPASTMEDICALHX_GEN_ALL_CORE_FT
PAST MEDICAL HISTORY:  Diabetes type 2    Esophageal cancer stent placement May 2020    History of gastroesophageal reflux (GERD)     HLD (hyperlipidemia)     HTN (hypertension)     S/P PICC central line placement

## 2020-08-21 NOTE — ED PROVIDER NOTE - OBJECTIVE STATEMENT
58 y/o M pt with hx of HTN, HLD, DM, esophageal ca on chemo currently presenting today with cc of fever. Pt reports fever developed yesterday and he developed a productive cough this morning. Pt was recently admitted to [facility] last week for a PICC line placement. Pt denies any chest pain, dyspnea, n/v/d, abdominal pain, dysuria, headache, congestion, sore throat, neck pain, back pain, weakness, numbness, tingling, dizziness, syncope, or other complaint.     Onc: Dr. Santiago @ [facility]

## 2020-08-21 NOTE — ED PROVIDER NOTE - PHYSICAL EXAMINATION
Gen: no acute distress  Head: normocephalic, atraumatic  Lung: no respiratory distress, rales to R base  CV: normal s1/s2, tachycardic, regular rhythm  Abd: soft, non-tender, non-distended  MSK: No edema, no visible deformities, full range of motion in all 4 extremities  Neuro: No focal neurologic deficits  Skin: No rash, piccl line to R arm, site non-erythematous, no drainage  Psych: normal affect

## 2020-08-21 NOTE — ED ADULT NURSE NOTE - OBJECTIVE STATEMENT
Pt with recent PICC placement to RUE with biopsies to lower back on Monday and Tuesday of this week reports having cough with difficulty breathing and fevers starting last night, pt with recent pneumonia dx during previous hospital stay, pt with rhonci present B/L and reports "clear stuff coming out", pt with wet cough present, hot to touch, denies chest pain

## 2020-08-21 NOTE — CONSULT NOTE ADULT - SUBJECTIVE AND OBJECTIVE BOX
HPI: 59 year old man with hx of HTN, HLD, DM, esophageal ca s/p esophageal stent on 5/28/2020 and on chemotherapy presenting with fever. Pt reports fever developed yesterday and he developed a productive cough this morning. Pt was recently admitted to [facility] last week for a PICC line placement. Pt denies any chest pain, dyspnea, n/v/d, abdominal pain, dysuria, headache, congestion, sore throat, neck pain, back pain, weakness, numbness, tingling, dizziness, syncope, or other complaint. He was noted to have stomach distension. GI evluation was called for that. Patient is denying any nausea, vomiting. No difficulty eating.      PAST MEDICAL & SURGICAL HISTORY:  HTN (hypertension)  HLD (hyperlipidemia)  History of gastroesophageal reflux (GERD)  Esophageal cancer: stent placement May 2020  Diabetes  H/O colonoscopy  History of cancer chemotherapy      ROS:  No Heartburn, regurgitation, dysphagia, odynophagia.  No dyspepsia  No abdominal pain.    No Nausea, vomiting.  No Bleeding.  No hematemesis.   No diarrhea.    No hematochesia.  No weight loss, anorexia.  No edema.      MEDICATIONS  (STANDING):  sodium chloride 0.9%. 1000 milliLiter(s) (150 mL/Hr) IV Continuous <Continuous>    MEDICATIONS  (PRN):      Allergies    Allergy Status Unknown    Intolerances    no vegetables and no bread (Unknown)  requests tuna salad (Unknown)      SOCIAL HISTORY:Denies x3    FAMILY HISTORY:  FHx: throat cancer  FHx: diabetes mellitus      Vital Signs Last 24 Hrs  T(C): 38.5 (21 Aug 2020 12:30), Max: 38.5 (21 Aug 2020 12:30)  T(F): 101.3 (21 Aug 2020 12:30), Max: 101.3 (21 Aug 2020 12:30)  HR: 125 (21 Aug 2020 12:30) (124 - 146)  BP: 98/56 (21 Aug 2020 12:30) (88/51 - 123/58)  BP(mean): --  RR: 22 (21 Aug 2020 12:30) (22 - 26)  SpO2: 97% (21 Aug 2020 12:30) (87% - 100%)    PHYSICAL EXAM:    GENERAL: NAD, well-groomed, well-developed  HEAD:  Atraumatic, Normocephalic  EYES: EOMI, PERRLA, conjunctiva and sclera clear  ENMT: No tonsillar erythema, exudates, or enlargement; Moist mucous membranes, Good dentition, No lesions  NECK: Supple, No JVD, Normal thyroid  CHEST/LUNG: Clear to percussion bilaterally; No rales, rhonchi, wheezing, or rubs  HEART:Tachycardiac, No murmurs, rubs, or gallops  ABDOMEN: Soft, Nontender, Nondistended; Bowel sounds present  EXTREMITIES:  2+ Peripheral Pulses, No clubbing, cyanosis, or edema  LYMPH: No lymphadenopathy noted  SKIN: No rashes or lesions      LABS:                        9.3    14.82 )-----------( 290      ( 21 Aug 2020 09:47 )             29.7     08-21    128<L>  |  89<L>  |  40.0<H>  ----------------------------<  218<H>  5.0   |  19.0<L>  |  1.86<H>    Ca    10.1      21 Aug 2020 09:47    TPro  8.2  /  Alb  3.8  /  TBili  0.3<L>  /  DBili  x   /  AST  25  /  ALT  17  /  AlkPhos  101  08-21    PT/INR - ( 21 Aug 2020 09:47 )   PT: 12.3 sec;   INR: 1.06 ratio         PTT - ( 21 Aug 2020 09:47 )  PTT:26.8 sec       LIVER FUNCTIONS - ( 21 Aug 2020 09:47 )  Alb: 3.8 g/dL / Pro: 8.2 g/dL / ALK PHOS: 101 U/L / ALT: 17 U/L / AST: 25 U/L / GGT: x               RADIOLOGY & ADDITIONAL STUDIES:< from: CT Chest No Cont (08.21.20 @ 11:33) >     EXAM:  CT CHEST                          PROCEDURE DATE:  08/21/2020          INTERPRETATION:  CLINICAL INFORMATION: Sepsis, cough    COMPARISON: August 13, 2020    PROCEDURE:  CT of the Chest was performed without intravenous contrast.  Sagittal and coronal reformats were performed.    FINDINGS:    LUNGS AND AIRWAYS: Patent central airways.  Diffuse multifocal bilateral airspace disease involving all lobes with groundglass and more solid opacities, most severe in the right middle lobe and both lower lobes, worsened since prior study.  PLEURA: No pleural effusion.  MEDIASTINUM AND DEENA: Stent in the lower esophagus, extending into the stomach, traversing a large gastroesophageal junction mass. Mild fluid-filled dilatation of the esophagus.  VESSELS: Right-sided venous catheter with tip at the cavoatrial junction.  HEART: Heart size is normal. No pericardial effusion.  CHEST WALL AND LOWER NECK: Within normal limits.  VISUALIZED UPPER ABDOMEN: Partially imaged retrocaval lymph node measuring 1.5 x 2.1 cm. Fluid-filled dilatation of the stomach.  BONES: Within normal limits.    IMPRESSION:  Multifocal pneumonia, worsened since August 13, 2000.    Fluid-filled dilatation of the esophagus, which may put the patient at risk for aspiration.    Distal esophageal mass compatible with known history of esophageal cancer.    Metastatic retroperitoneal lymphadenopathy, partially imaged.    Fluid-filled dilatation of the stomach.              AMARI CAMPBELL M.D.,ATTENDING RADIOLOGIST  This document has been electronically signed. Aug 21 2020 12:10PM                  < end of copied text >

## 2020-08-21 NOTE — CONSULT NOTE ADULT - ASSESSMENT
Influenza (Child)    Influenza is also called the flu. It is a viral illness that affects the air passages of your lungs. It is different from the common cold. The flu can easily be passed from one to person to another. It may be spread through the air by coughing and sneezing. Or it can be spread by touching the sick person and then touching your own eyes, nose, or mouth.  Symptoms of the flu may be mild or severe. They can include extreme tiredness (wanting to stay in bed all day), chills, fevers, muscle aches, soreness with eye movement, headache, and a dry, hacking cough.  Your child usually won’t need to take antibiotics, unless he or she has a complication. This might be an ear or sinus infection or pneumonia.  Home care  Follow these guidelines when caring for your child at home:  · Fluids. Fever increases the amount of water your child loses from his or her body. For babies younger than 1 year old, keep giving regular feedings (formula or breast). Talk with your child’s healthcare provider to find out how much fluid your baby should be getting. If needed, give an oral rehydration solution. You can buy this at the grocery or drugstore without a prescription. For a child older than 1 year, give him or her more fluids and continue his or her normal diet. If your child is dehydrated, give an oral rehydration. Go back to your child’s normal diet as soon as possible. If your child has diarrhea, don’t give juice, flavored gelatin water, soft drinks without caffeine, lemonade, fruit drinks, or popsicles. This may make diarrhea worse.  · Food. If your child doesn’t want to eat solid foods, it’s OK for a few days. Make sure your child drinks lots of fluid and has a normal amount of urine.  · Activity. Keep children with fever at home resting or playing quietly. Encourage your child to take naps. Your child may go back to  or school when the fever is gone for at least 24 hours. The fever should be gone without  giving your child acetaminophen or other medicine to reduce fever. Your child should also be eating well and feeling better.  · Sleep. It’s normal for your child to be unable to sleep or be irritable if he or she has the flu. A child who has congestion will sleep best with his or her head and upper body raised up. Or you can raise the head of the bed frame on a 6-inch block.  · Cough. Coughing is a normal part of the flu. You can use a cool mist humidifier at the bedside. Don’t give over-the-counter cough and cold medicines to children younger than 6 years of age, unless the healthcare provider tells you to do so. These medicines don’t help ease symptoms. And they can cause serious side effects, especially in babies younger than 2 years of age. Don’t allow anyone to smoke around your child. Smoke can make the cough worse.  · Nasal congestion. Use a rubber bulb syringe to suction the nose of a baby. You may put 2 to 3 drops of saltwater (saline) nose drops in each nostril before suctioning. This will help remove secretions. You can buy saline nose drops without a prescription. You can make the drops yourself by adding 1/4 teaspoon table salt to 1 cup of water.  · Fever. Use acetaminophen to control pain, unless another medicine was prescribed. In infants older than 6 months of age, you may use ibuprofen instead of acetaminophen. If your child has chronic liver or kidney disease, talk with your child’s provider before using these medicines. Also talk with the provider if your child has ever had a stomach ulcer or GI bleeding. Don’t give aspirin to anyone under 18 years of age who is ill with a fever. It may cause severe liver damage.  Follow-up care  Follow up with your child’s health care provider, or as advised.  When to seek medical advice  Call your child’s healthcare provider right away if any of these occur:  · Your child is younger than 12 weeks old and has a fever of 100.4°F (38°C) or higher. Your baby may  need to be seen by a healthcare provider.  · Your child has repeated fevers above 104°F (40°C) at any age.  · Your child is younger than 2 years old and his or her fever continues for more than 24 hours. Or your child is 2 years old or older and his or her fever continues for more than 3 days.  · Fast breathing. In a child 6 weeks to 2 years, this is more than 45 breaths per minute. In a child 3 to 6 years, this is more than 35 breaths per minute. In a child 7 to 10 years, this is more than 30 breaths per minute. In a child older than 10 years, this is more than  25 breaths per minute.  · Earache, sinus pain, stiff or painful neck, headache, or repeated diarrhea or vomiting  · Unusual fussiness, drowsiness, or confusion  · Your child doesn’t interact with you as he or she normally does  · Your child doesn’t want to be held  · Not drinking enough fluid. This may show as no tears when crying, or \"sunken\" eyes or dry mouth. It may also be no wet diapers for 8 hours in a baby. Or it may be less urine than usual in older children.  · Rash with fever  © 6813-1418 [employer]. [address] [place] [address], [place] [address]. All rights reserved. This information is not intended as a substitute for professional medical care. Always follow your healthcare professional's instructions.         Patient with esophageal cancer with stent in place and now with dilated esophagus and stomach. No clinical evidence of gastric outlet obstruction. At this time, treat with IV antibiotics. Keep NPO. IV PPI bid. Esophagram on monday to consider stent patency. If stent seems occluded, may need to have EGD with stent removal and replacement.

## 2020-08-21 NOTE — ED ADULT TRIAGE NOTE - CHIEF COMPLAINT QUOTE
Patient arrived to ED today with c/o fever, vomiting this morning, dehydration.  Patient has tumor in his esophagus and a stent was placed.  Patient was told he has 6 months to live and is to start chemo soon.  Patient has not taken his medication today.  Patient was seen two days ago.  Patients mother giving information because patient has lost his voice, mother is caregiver.

## 2020-08-21 NOTE — H&P ADULT - PROBLEM SELECTOR PLAN 3
on ckd 3  sec to sepsis and insensible losses  zachariah d5ns  trend renal function, if not improving consider renal

## 2020-08-21 NOTE — CONSULT NOTE ADULT - ASSESSMENT
59 year old gentleman with unresectable HER2 positive esopagheal cancer, last treated with carbo/taxol on 6/30/20, recently hospitalized, who now presents with fever, leukocytosis, worsening multifocal pneumonia on CT chest    1)sepsis secondary to multifocal pneumonia  -on vanc/zosyn  -ID consult pending  -cultures pending  -trend CBC for leukocytosis secondary to infection    2)ISIDRO secondary to ?poor po intake - on IVF    3)esophageal cancer  -plan is for additional chemotherapy once he recovers from infection / primary oncologist is Dr. Santiago

## 2020-08-21 NOTE — ED PROVIDER NOTE - CARE PLAN
Principal Discharge DX:	Sepsis  Secondary Diagnosis:	Pneumonia  Secondary Diagnosis:	Esophageal cancer

## 2020-08-22 NOTE — SPEECH LANGUAGE PATHOLOGY EVALUATION - SLP GENERAL OBSERVATIONS
Pt recd awake in stretcher in ED, A&A Ox4, tolerating RA without overt distress, 0/10 pain pre and post, +moderate-severely hoarse vocal quality, denying pharyngitis/odynophagia/GERD symptoms

## 2020-08-22 NOTE — SPEECH LANGUAGE PATHOLOGY EVALUATION - COMMENTS
GI note 8/22/20, " Malignant neoplasm of lower third of esophagus.  Plan: -  Patient with PNA  - esophagram ordered to evaluate patency of stent. patient is able to eat at home. The stent crosses the  GE junction is common to cause reflux ( pt is  not complaining of heartburn).    - distended stomach on CT- pt does not have vomiting. WIll recheck Xray today. If no distension, will start clear liquids. Needs aspiration precautions". Suspect hoarseness to be 2* reflux caused by stent, as per GI. Pt provided w/reflux precaution information to alleviate symptoms and facilitate vocal hygiene/quality. If hoarseness persists following GI procedure, would consider ENT evaluation for objective view of vocal chords to ensure anatomical integrity & mobility. No further follow-up from this department. Please re-consult prn.

## 2020-08-22 NOTE — PATIENT PROFILE ADULT - HEALTH LITERACY
SUBJECTIVE:  Marilee Martinez is a 54 no

## 2020-08-22 NOTE — PROGRESS NOTE ADULT - PROBLEM SELECTOR PLAN 1
now with multifocal PNA  esophagram ordered assess patency of stent  check abdominal xray today in regards to gastric distension. Pt is not having vomiting. If not distended, we can consider clear liquids

## 2020-08-22 NOTE — SPEECH LANGUAGE PATHOLOGY EVALUATION - SLP DIAGNOSIS
Pt presents w/functional receptive/expressive/cognitive ability. No motor speech deficits. Suspect hoarse vocal quality to be 2* reflux caused by esophageal stent. Rx continue w/GI plan for eval of stent Monday.

## 2020-08-22 NOTE — PROGRESS NOTE ADULT - PROBLEM SELECTOR PLAN 1
-  Patient with PNA  - esophagram ordered to evaluate patency of stent. patient is able to eat at home. The stent crosses the  GE junction is common to cause reflux ( pt is  not complaining of heartburn).    - distended stomach on CT- pt does not have vomiting. WIll recheck Xray today. If no distension, will start clear liquds. Needs aspiration precautions.  - PPI BID

## 2020-08-22 NOTE — SPEECH LANGUAGE PATHOLOGY EVALUATION - SLP PERTINENT HISTORY OF CURRENT PROBLEM
As per MD note, "60 yo M with pmh DM2, HTN, HLD, Esophageal CA on chemo recently admitted for PICC line placement for chemo who states he developed fever yesterday and this morning represented with fever of 102.3 as well as cough with sputum production admitted with sepsis sec to aspiration pna and suspected occluded esophageal stent, acute respiratory failure with hypoxia and acute on chronic kidney disease stage 3".

## 2020-08-22 NOTE — PROGRESS NOTE ADULT - SUBJECTIVE AND OBJECTIVE BOX
Patient is a 59y old  Male who presents with a chief complaint of fever, cough (22 Aug 2020 08:05)      HPI:  60 yo M with pmh DM2, HTN, HLD, Esophageal CA on chemo recently admitted for PICC line placement for chemo who states he developed fever at home. NO further fever in ER. CT showed multifocal PNA, fluid filled stomach and esophagus. Pt had not vomiting. No abdominal pain        REVIEW OF SYSTEMS:  Constitutional: No fever, weight loss or fatigue  ENMT:  No difficulty hearing, tinnitus, vertigo; No sinus or throat pain  Respiratory: No cough, wheezing, chills or hemoptysis  Cardiovascular: No chest pain, palpitations, dizziness or leg swelling  Gastrointestinal: No abdominal or epigastric pain. No nausea, vomiting or hematemesis; No diarrhea or constipation. No melena or hematochezia.  Skin: No itching, burning, rashes or lesions   Musculoskeletal: No joint pain or swelling; No muscle, back or extremity pain    PAST MEDICAL & SURGICAL HISTORY:  S/P PICC central line placement  HTN (hypertension)  HLD (hyperlipidemia)  History of gastroesophageal reflux (GERD)  Esophageal cancer: stent placement May 2020  Diabetes: type 2  H/O colonoscopy  History of cancer chemotherapy      FAMILY HISTORY:  FHx: throat cancer: father, former heavy smoker  FHx: diabetes mellitus: maternal grandmother      SOCIAL HISTORY:  Smoking Status: [ ] Current, [ ] Former, [ ] Never  Pack Years:  [  ] EtOH-no  [  ] IVDA    MEDICATIONS:  MEDICATIONS  (STANDING):  atorvastatin 10 milliGRAM(s) Oral at bedtime  dextrose 5% + sodium chloride 0.9%. 1000 milliLiter(s) (125 mL/Hr) IV Continuous <Continuous>  dextrose 5%. 1000 milliLiter(s) (50 mL/Hr) IV Continuous <Continuous>  dextrose 50% Injectable 12.5 Gram(s) IV Push once  dextrose 50% Injectable 25 Gram(s) IV Push once  dextrose 50% Injectable 25 Gram(s) IV Push once  enoxaparin Injectable 40 milliGRAM(s) SubCutaneous daily  insulin lispro (HumaLOG) corrective regimen sliding scale   SubCutaneous every 6 hours  metoprolol succinate ER 50 milliGRAM(s) Oral daily  pantoprazole    Tablet 40 milliGRAM(s) Oral before breakfast  piperacillin/tazobactam IVPB.. 3.375 Gram(s) IV Intermittent every 8 hours  sucralfate 1 Gram(s) Oral two times a day  vancomycin  IVPB 1000 milliGRAM(s) IV Intermittent every 24 hours    MEDICATIONS  (PRN):  dextrose 40% Gel 15 Gram(s) Oral once PRN Blood Glucose LESS THAN 70 milliGRAM(s)/deciliter  glucagon  Injectable 1 milliGRAM(s) IntraMuscular once PRN Glucose LESS THAN 70 milligrams/deciliter  lactulose Syrup 20 Gram(s) Oral daily PRN constipation  ondansetron   Disintegrating Tablet 8 milliGRAM(s) Oral every 8 hours PRN for nausea  oxyCODONE    IR 10 milliGRAM(s) Oral every 6 hours PRN Severe Pain (7 - 10)  prochlorperazine   Tablet 10 milliGRAM(s) Oral every 6 hours PRN for nausea      Allergies    Allergy Status Unknown    Intolerances    no vegetables and no bread (Unknown)  requests tuna salad (Unknown)      Vital Signs Last 24 Hrs  T(C): 37.1 (22 Aug 2020 07:24), Max: 38.5 (21 Aug 2020 12:30)  T(F): 98.7 (22 Aug 2020 07:24), Max: 101.3 (21 Aug 2020 12:30)  HR: 101 (22 Aug 2020 07:24) (101 - 146)  BP: 98/60 (22 Aug 2020 07:24) (88/51 - 123/58)  BP(mean): 84 (22 Aug 2020 05:27) (84 - 84)  RR: 18 (22 Aug 2020 07:24) (18 - 26)  SpO2: 93% (22 Aug 2020 07:24) (87% - 100%)        PHYSICAL EXAM:    General: Well developed; well nourished; in no acute distress  HEENT: MMM, conjunctiva and sclera clear  H- RRR  L- CTA  Gastrointestinal: Soft, non-tender non-distended; Normal bowel sounds; No rebound or guarding  Extremities: Normal range of motion, No clubbing, cyanosis or edema  Neurological: Alert and oriented x3  Skin: Warm and dry. No obvious rash      LABS:                        9.3    14.82 )-----------( 290      ( 21 Aug 2020 09:47 )             29.7     21 Aug 2020 09:47    128    |  89     |  40.0   ----------------------------<  218    5.0     |  19.0   |  1.86     Ca    10.1       21 Aug 2020 09:47    TPro  8.2    /  Alb  3.8    /  TBili  0.3    /  DBili  x      /  AST  25     /  ALT  17     /  AlkPhos  101    / Amylase x      /Lipase x      21 Aug 2020 09:47              RADIOLOGY & ADDITIONAL STUDIES:     < from: CT Abdomen and Pelvis No Cont (08.21.20 @ 12:13) >  MPRESSION:  Worsening multifocal pneumonia since prior CT of 8/13/2020. Please refer to dedicated CT chest performed earlier today.    Gastric distention with fluid.    Distal esophageal stent is unchanged in location, traversing the patient's known gastroesophagealmass. Fluid in the distal esophagus; recommend aspiration precautions.        < end of copied text >

## 2020-08-22 NOTE — PROGRESS NOTE ADULT - ASSESSMENT
58 yo M with pmh DM2, HTN, HLD, Esophageal CA on chemo recently admitted for PICC line placement for chemo who states he developed fever yesterday and this morning represented with fever of 102.3 as well as cough with sputum production admitted with sepsis sec to aspiration pna and suspected occluded esophageal stent, acute respiratory failure with hypoxia and acute on chronic kidney disease stage 3     Problem/Plan - 1:  ·  Problem: Sepsis with acute hypoxic respiratory failure and septic shock, due to unspecified organism.  Plan: -acute, sec to asp pna from poss occluded stent  -cont iv abx  --> follwo up blood cx     Problem/Plan - 2:  ·  Problem: Pneumonia.  Plan: -asp  -plan as above.   - speech consult appreciated     Problem/Plan - 3:  ·  Problem: anemia - will order 1 unit prbc     Problem/Plan - 4:  ·  Problem: Hyponatremia. improved     Problem/Plan - 5:  ·  Problem: Esophageal cancer.  Plan: acute on chronic  currently undergoing chemo via PICC  - outpatient follow up      Problem/Plan - 6:  Problem: HTN (hypertension). Plan: chronic, stable  cont home meds with parameters  monitor vs.     Problem/Plan - 7:  ·  Problem: Type 2 diabetes mellitus without complication, without long-term current use of insulin.  - ssi     Problem/Plan - 8:  ·  Problem: Other hyperlipidemia.  Plan: chronic, stable  cont home statin.     long term prognosis poor  spoke to sister, wants hospice eval and longt erm 60 yo M with pmh DM2, HTN, HLD, Esophageal CA on chemo recently admitted for PICC line placement for chemo who states he developed fever yesterday and this morning represented with fever of 102.3 as well as cough with sputum production admitted with sepsis sec to aspiration pna and suspected occluded esophageal stent, acute respiratory failure with hypoxia and acute on chronic kidney disease stage 3     Problem/Plan - 1:  ·  Problem: Sepsis with acute hypoxic respiratory failure and septic shock, due to unspecified organism.  Plan: -acute, sec to asp pna from poss occluded stent  -cont iv abx  --> follwo up blood cx     Problem/Plan - 2:  ·  Problem: Pneumonia.  Plan: -asp  -plan as above.   - speech consult appreciated     Problem/Plan - 3:  ·  Problem: anemia - will order 1 unit prbc     Problem/Plan - 4:  ·  Problem: Hyponatremia. improved     Problem/Plan - 5:  ·  Problem: Esophageal cancer.  Plan: acute on chronic  currently undergoing chemo via PICC  - outpatient follow up      Problem/Plan - 6:  Problem: HTN (hypertension). Plan: chronic, stable  cont home meds with parameters  monitor vs.     Problem/Plan - 7:  ·  Problem: Type 2 diabetes mellitus without complication, without long-term current use of insulin.  -   -a1c is 6  --> dc ssi      Problem/Plan - 8:  ·  Problem: Other hyperlipidemia.  Plan: chronic, stable  cont home statin.     long term prognosis poor  spoke to sister, wants hospice eval and [facility]

## 2020-08-22 NOTE — PROGRESS NOTE ADULT - SUBJECTIVE AND OBJECTIVE BOX
Patient is a 59y old  Male who presents with a chief complaint of fever, cough (21 Aug 2020 18:33)      HPI:  58 yo M with pmh DM2, HTN, HLD, Esophageal CA on chemo recently admitted for PICC line placement for chemo who came with fever. Patient has had no further fever. Ct showed dilated fluid filled esophagus and stomach. Patient has had no nausea or vomiting. Was eating solids.              REVIEW OF SYSTEMS:  Constitutional: No fever, weight loss or fatigue  ENMT:  No difficulty hearing, tinnitus, vertigo; No sinus or throat pain  Respiratory: No cough, wheezing, chills or hemoptysis  Cardiovascular: No chest pain, palpitations, dizziness or leg swelling  Gastrointestinal: No abdominal or epigastric pain. No nausea, vomiting or hematemesis; No diarrhea or constipation. No melena or hematochezia.  Skin: No itching, burning, rashes or lesions   Musculoskeletal: No joint pain or swelling; No muscle, back or extremity pain    PAST MEDICAL & SURGICAL HISTORY:  S/P PICC central line placement  HTN (hypertension)  HLD (hyperlipidemia)  History of gastroesophageal reflux (GERD)  Esophageal cancer: stent placement May 2020  Diabetes: type 2  H/O colonoscopy  History of cancer chemotherapy      FAMILY HISTORY:  FHx: throat cancer: father, former heavy smoker  FHx: diabetes mellitus: maternal grandmother      SOCIAL HISTORY:  Smoking Status: [ ] Current, [ ] Former, [ ] Never  Pack Years:  [  ] EtOH-no  [  ] IVDA    MEDICATIONS:  MEDICATIONS  (STANDING):  atorvastatin 10 milliGRAM(s) Oral at bedtime  dextrose 5% + sodium chloride 0.9%. 1000 milliLiter(s) (125 mL/Hr) IV Continuous <Continuous>  dextrose 5%. 1000 milliLiter(s) (50 mL/Hr) IV Continuous <Continuous>  dextrose 50% Injectable 12.5 Gram(s) IV Push once  dextrose 50% Injectable 25 Gram(s) IV Push once  dextrose 50% Injectable 25 Gram(s) IV Push once  enoxaparin Injectable 40 milliGRAM(s) SubCutaneous daily  insulin lispro (HumaLOG) corrective regimen sliding scale   SubCutaneous every 6 hours  metoprolol succinate ER 50 milliGRAM(s) Oral daily  pantoprazole    Tablet 40 milliGRAM(s) Oral before breakfast  piperacillin/tazobactam IVPB.. 3.375 Gram(s) IV Intermittent every 8 hours  sucralfate 1 Gram(s) Oral two times a day  vancomycin  IVPB 1000 milliGRAM(s) IV Intermittent every 24 hours    MEDICATIONS  (PRN):  dextrose 40% Gel 15 Gram(s) Oral once PRN Blood Glucose LESS THAN 70 milliGRAM(s)/deciliter  glucagon  Injectable 1 milliGRAM(s) IntraMuscular once PRN Glucose LESS THAN 70 milligrams/deciliter  lactulose Syrup 20 Gram(s) Oral daily PRN constipation  ondansetron   Disintegrating Tablet 8 milliGRAM(s) Oral every 8 hours PRN for nausea  oxyCODONE    IR 10 milliGRAM(s) Oral every 6 hours PRN Severe Pain (7 - 10)  prochlorperazine   Tablet 10 milliGRAM(s) Oral every 6 hours PRN for nausea      Allergies    Allergy Status Unknown    Intolerances    no vegetables and no bread (Unknown)  requests tuna salad (Unknown)      Vital Signs Last 24 Hrs  T(C): 37.1 (22 Aug 2020 07:24), Max: 38.5 (21 Aug 2020 12:30)  T(F): 98.7 (22 Aug 2020 07:24), Max: 101.3 (21 Aug 2020 12:30)  HR: 101 (22 Aug 2020 07:24) (101 - 146)  BP: 98/60 (22 Aug 2020 07:24) (88/51 - 123/58)  BP(mean): 84 (22 Aug 2020 05:27) (84 - 84)  RR: 18 (22 Aug 2020 07:24) (18 - 26)  SpO2: 93% (22 Aug 2020 07:24) (87% - 100%)        PHYSICAL EXAM:    General: Well developed; well nourished; in no acute distress  HEENT: MMM, conjunctiva and sclera clear  H- RRR  L- CTA  Gastrointestinal: Soft, non-tender non-distended; Normal bowel sounds; No rebound or guarding  Extremities: Normal range of motion, No clubbing, cyanosis or edema  Neurological: Alert and oriented x3  Skin: Warm and dry. No obvious rash      LABS:                        9.3    14.82 )-----------( 290      ( 21 Aug 2020 09:47 )             29.7     21 Aug 2020 09:47    128    |  89     |  40.0   ----------------------------<  218    5.0     |  19.0   |  1.86     Ca    10.1       21 Aug 2020 09:47    TPro  8.2    /  Alb  3.8    /  TBili  0.3    /  DBili  x      /  AST  25     /  ALT  17     /  AlkPhos  101    / Amylase x      /Lipase x      21 Aug 2020 09:47              RADIOLOGY & ADDITIONAL STUDIES:     < from: CT Abdomen and Pelvis No Cont (08.21.20 @ 12:13) >  IMPRESSION:  Worsening multifocal pneumonia since prior CT of 8/13/2020. Please refer to dedicated CT chest performed earlier today.    Gastric distention with fluid.    Distal esophageal stent is unchanged in location, traversing the patient's known gastroesophagealmass. Fluid in the distal esophagus; recommend aspiration precautions.      < end of copied text >

## 2020-08-22 NOTE — PROGRESS NOTE ADULT - SUBJECTIVE AND OBJECTIVE BOX
EDITH VALENCIA    [id number]    59y      Male    INTERVAL HPI/OVERNIGHT EVENTS: patient being seen for recurrent pneumonia. patient seen at bedside and denies any complaints.     last 24 hours patients tmax 99    REVIEW OF SYSTEMS:    CONSTITUTIONAL: No fever, weight loss, or fatigue  RESPIRATORY: No cough, wheezing, hemoptysis; No shortness of breath  CARDIOVASCULAR: No chest pain, palpitations  GASTROINTESTINAL: No abdominal or epigastric pain. No nausea, vomiting  NEUROLOGICAL: No headaches, memory loss, loss of strength.  MISCELLANEOUS:      Vital Signs Last 24 Hrs  T(C): 36.6 (22 Aug 2020 11:55), Max: 37.2 (21 Aug 2020 20:09)  T(F): 97.8 (22 Aug 2020 11:55), Max: 99 (21 Aug 2020 20:09)  HR: 119 (22 Aug 2020 11:55) (101 - 119)  BP: 116/61 (22 Aug 2020 11:55) (98/60 - 116/61)  BP(mean): 84 (22 Aug 2020 05:27) (84 - 84)  RR: 18 (22 Aug 2020 11:55) (18 - 22)  SpO2: 97% (22 Aug 2020 11:55) (93% - 98%)    PHYSICAL EXAM:    GENERAL: NAD,   HEENT: PERRL, +EOMI  NECK: soft, Supple, No JVD,   CHEST/LUNG: diminsihed   HEART: S1S2+, Regular rate and rhythm; No murmurs, rubs, or gallops  ABDOMEN: Soft, Nontender, Nondistended; Bowel sounds present  EXTREMITIES:  2+ Peripheral Pulses, No clubbing, cyanosis, or edema  SKIN: No rashes or lesions  NEURO: AAOX3, no focal deficits,     LABS:                        7.1    10.50 )-----------( 133      ( 22 Aug 2020 09:29 )             22.7     08-22    134<L>  |  99  |  39.0<H>  ----------------------------<  136<H>  4.3   |  21.0<L>  |  1.47<H>    Ca    8.6      22 Aug 2020 07:45  Mg     1.7     08-22    TPro  6.0<L>  /  Alb  2.8<L>  /  TBili  <0.2<L>  /  DBili  x   /  AST  9   /  ALT  9   /  AlkPhos  62  08-22    PT/INR - ( 21 Aug 2020 09:47 )   PT: 12.3 sec;   INR: 1.06 ratio         PTT - ( 21 Aug 2020 09:47 )  PTT:26.8 sec  Urinalysis Basic - ( 21 Aug 2020 18:00 )    Color: Yellow / Appearance: Slightly Turbid / S.010 / pH: x  Gluc: x / Ketone: Negative  / Bili: Negative / Urobili: Negative mg/dL   Blood: x / Protein: 30 mg/dL / Nitrite: Negative   Leuk Esterase: Negative / RBC: Negative /HPF / WBC 0-2   Sq Epi: x / Non Sq Epi: Occasional / Bacteria: Few          MEDICATIONS  (STANDING):  atorvastatin 10 milliGRAM(s) Oral at bedtime  dextrose 5%. 1000 milliLiter(s) (50 mL/Hr) IV Continuous <Continuous>  dextrose 50% Injectable 12.5 Gram(s) IV Push once  dextrose 50% Injectable 25 Gram(s) IV Push once  dextrose 50% Injectable 25 Gram(s) IV Push once  enoxaparin Injectable 40 milliGRAM(s) SubCutaneous daily  insulin lispro (HumaLOG) corrective regimen sliding scale   SubCutaneous every 6 hours  metoprolol succinate ER 50 milliGRAM(s) Oral daily  midodrine. 2.5 milliGRAM(s) Oral three times a day  pantoprazole    Tablet 40 milliGRAM(s) Oral before breakfast  piperacillin/tazobactam IVPB.. 3.375 Gram(s) IV Intermittent every 8 hours  sucralfate 1 Gram(s) Oral two times a day  vancomycin  IVPB 1000 milliGRAM(s) IV Intermittent every 24 hours    MEDICATIONS  (PRN):  dextrose 40% Gel 15 Gram(s) Oral once PRN Blood Glucose LESS THAN 70 milliGRAM(s)/deciliter  glucagon  Injectable 1 milliGRAM(s) IntraMuscular once PRN Glucose LESS THAN 70 milligrams/deciliter  lactulose Syrup 20 Gram(s) Oral daily PRN constipation  ondansetron   Disintegrating Tablet 8 milliGRAM(s) Oral every 8 hours PRN for nausea  oxyCODONE    IR 10 milliGRAM(s) Oral every 6 hours PRN Severe Pain (7 - 10)  prochlorperazine   Tablet 10 milliGRAM(s) Oral every 6 hours PRN for nausea      RADIOLOGY & ADDITIONAL TESTS:

## 2020-08-23 NOTE — PROGRESS NOTE ADULT - SUBJECTIVE AND OBJECTIVE BOX
EDITH VALENCIA    [id number]    59y      Male    INTERVAL HPI/OVERNIGHT EVENTS:  patient being seen for recurrent pneumonia. Patient seen at bedside and states feeling well    last 24 hours patient had 1 unit prbc    REVIEW OF SYSTEMS:    CONSTITUTIONAL: No fever, weight loss, or fatigue  RESPIRATORY: No cough, wheezing, hemoptysis; No shortness of breath  CARDIOVASCULAR: No chest pain, palpitations  GASTROINTESTINAL: No abdominal or epigastric pain. No nausea, vomiting  NEUROLOGICAL: No headaches, memory loss, loss of strength.  MISCELLANEOUS:      Vital Signs Last 24 Hrs  T(C): 36.7 (23 Aug 2020 08:38), Max: 37.1 (23 Aug 2020 05:38)  T(F): 98.1 (23 Aug 2020 08:38), Max: 98.8 (23 Aug 2020 05:38)  HR: 103 (23 Aug 2020 08:38) (103 - 119)  BP: 135/76 (23 Aug 2020 08:38) (107/63 - 135/76)  BP(mean): --  RR: 18 (23 Aug 2020 08:38) (18 - 19)  SpO2: 93% (23 Aug 2020 08:38) (93% - 99%)    PHYSICAL EXAM:    GENERAL: NAD,   HEENT: PERRL, +EOMI  NECK: soft, Supple, No JVD,   CHEST/LUNG: diminsihed   HEART: S1S2+, Regular rate and rhythm; No murmurs, rubs, or gallops  ABDOMEN: Soft, Nontender, Nondistended; Bowel sounds present  EXTREMITIES:  2+ Peripheral Pulses, No clubbing, cyanosis, or edema  SKIN: No rashes or lesions  NEURO: AAOX3, no focal deficits,       LABS:                        7.7    9.09  )-----------( 185      ( 23 Aug 2020 08:37 )             23.2     08-23    134<L>  |  99  |  23.0<H>  ----------------------------<  190<H>  4.1   |  23.0  |  1.47<H>    Ca    9.2      23 Aug 2020 08:34  Mg     1.7     08-23    TPro  6.5<L>  /  Alb  2.9<L>  /  TBili  0.2<L>  /  DBili  x   /  AST  14  /  ALT  11  /  AlkPhos  77  08-23      Urinalysis Basic - ( 21 Aug 2020 18:00 )    Color: Yellow / Appearance: Slightly Turbid / S.010 / pH: x  Gluc: x / Ketone: Negative  / Bili: Negative / Urobili: Negative mg/dL   Blood: x / Protein: 30 mg/dL / Nitrite: Negative   Leuk Esterase: Negative / RBC: Negative /HPF / WBC 0-2   Sq Epi: x / Non Sq Epi: Occasional / Bacteria: Few          MEDICATIONS  (STANDING):  atorvastatin 10 milliGRAM(s) Oral at bedtime  enoxaparin Injectable 40 milliGRAM(s) SubCutaneous daily  metoprolol succinate ER 50 milliGRAM(s) Oral daily  pantoprazole    Tablet 40 milliGRAM(s) Oral before breakfast  piperacillin/tazobactam IVPB.. 3.375 Gram(s) IV Intermittent every 8 hours  sucralfate 1 Gram(s) Oral two times a day  vancomycin  IVPB 1000 milliGRAM(s) IV Intermittent every 24 hours    MEDICATIONS  (PRN):  lactulose Syrup 20 Gram(s) Oral daily PRN constipation  ondansetron   Disintegrating Tablet 8 milliGRAM(s) Oral every 8 hours PRN for nausea  oxyCODONE    IR 10 milliGRAM(s) Oral every 6 hours PRN Severe Pain (7 - 10)  prochlorperazine   Tablet 10 milliGRAM(s) Oral every 6 hours PRN for nausea      RADIOLOGY & ADDITIONAL TESTS:

## 2020-08-23 NOTE — PHARMACOTHERAPY INTERVENTION NOTE - COMMENTS
Spoke with patient at bedside for medication list.
Sepsis
Vancomycin level ordered   level not drawn this morning

## 2020-08-23 NOTE — PROGRESS NOTE ADULT - SUBJECTIVE AND OBJECTIVE BOX
Patient is a 59y old  Male who presents with a chief complaint of fever, cough (23 Aug 2020 11:29)      HPI:  58 yo M with pmh DM2, HTN, HLD, Esophageal CA on chemo . Here with PNA. NO reflux. NO abdominal pain or nausea. Xray showed no gastric distension. Sfot diet was restarted and pt is tolerating . no fevers    REVIEW OF SYSTEMS:  Constitutional: No fever, weight loss or fatigue  ENMT:  No difficulty hearing, tinnitus, vertigo; No sinus or throat pain  Respiratory: No cough, wheezing, chills or hemoptysis  Cardiovascular: No chest pain, palpitations, dizziness or leg swelling  Gastrointestinal: No abdominal or epigastric pain. No nausea, vomiting or hematemesis; No diarrhea or constipation. No melena or hematochezia.  Skin: No itching, burning, rashes or lesions   Musculoskeletal: No joint pain or swelling; No muscle, back or extremity pain    PAST MEDICAL & SURGICAL HISTORY:  S/P PICC central line placement  HTN (hypertension)  HLD (hyperlipidemia)  History of gastroesophageal reflux (GERD)  Esophageal cancer: stent placement May 2020  Diabetes: type 2  H/O colonoscopy  History of cancer chemotherapy      FAMILY HISTORY:  FHx: throat cancer: father, former heavy smoker  FHx: diabetes mellitus: maternal grandmother      SOCIAL HISTORY:  Smoking Status: [ ] Current, [ ] Former, [ ] Never  Pack Years:  [  ] EtOH-no  [  ] IVDA    MEDICATIONS:  MEDICATIONS  (STANDING):  atorvastatin 10 milliGRAM(s) Oral at bedtime  enoxaparin Injectable 40 milliGRAM(s) SubCutaneous daily  metoprolol succinate ER 50 milliGRAM(s) Oral daily  pantoprazole    Tablet 40 milliGRAM(s) Oral before breakfast  piperacillin/tazobactam IVPB.. 3.375 Gram(s) IV Intermittent every 8 hours  sucralfate 1 Gram(s) Oral two times a day  vancomycin  IVPB 1000 milliGRAM(s) IV Intermittent every 24 hours    MEDICATIONS  (PRN):  lactulose Syrup 20 Gram(s) Oral daily PRN constipation  ondansetron   Disintegrating Tablet 8 milliGRAM(s) Oral every 8 hours PRN for nausea  oxyCODONE    IR 10 milliGRAM(s) Oral every 6 hours PRN Severe Pain (7 - 10)  prochlorperazine   Tablet 10 milliGRAM(s) Oral every 6 hours PRN for nausea      Allergies    Allergy Status Unknown    Intolerances    no vegetables and no bread (Unknown)  requests tuna salad (Unknown)      Vital Signs Last 24 Hrs  T(C): 36.7 (23 Aug 2020 08:38), Max: 37.1 (23 Aug 2020 05:38)  T(F): 98.1 (23 Aug 2020 08:38), Max: 98.8 (23 Aug 2020 05:38)  HR: 103 (23 Aug 2020 08:38) (103 - 109)  BP: 135/76 (23 Aug 2020 08:38) (107/63 - 135/76)  BP(mean): --  RR: 18 (23 Aug 2020 08:38) (18 - 19)  SpO2: 93% (23 Aug 2020 08:38) (93% - 99%)    08-23 @ 07:01  -  08-23 @ 12:08  --------------------------------------------------------  IN: 0 mL / OUT: 600 mL / NET: -600 mL          PHYSICAL EXAM:    General: Well developed; well nourished; in no acute distress  HEENT: MMM, conjunctiva and sclera clear  H- RRR  L- CTA  Gastrointestinal: Soft, non-tender non-distended; Normal bowel sounds; No rebound or guarding  Extremities: Normal range of motion, No clubbing, cyanosis or edema  Neurological: Alert and oriented x3  Skin: Warm and dry. No obvious rash      LABS:                        7.7    9.09  )-----------( 185      ( 23 Aug 2020 08:37 )             23.2     23 Aug 2020 08:34    134    |  99     |  23.0   ----------------------------<  190    4.1     |  23.0   |  1.47     Ca    9.2        23 Aug 2020 08:34  Mg     1.7       23 Aug 2020 08:34    TPro  6.5    /  Alb  2.9    /  TBili  0.2    /  DBili  x      /  AST  14     /  ALT  11     /  AlkPhos  77     / Amylase x      /Lipase x      23 Aug 2020 08:34              RADIOLOGY & ADDITIONAL STUDIES:     < from: CT Abdomen and Pelvis No Cont (08.21.20 @ 12:13) >  MPRESSION:  Worsening multifocal pneumonia since prior CT of 8/13/2020. Please refer to dedicated CT chest performed earlier today.    Gastric distention with fluid.    Distal esophageal stent is unchanged in location, traversing the patient's known gastroesophagealmass. Fluid in the distal esophagus; recommend aspiration precautions.    < end of copied text >

## 2020-08-23 NOTE — PROGRESS NOTE ADULT - ASSESSMENT
58 yo M with pmh DM2, HTN, HLD, Esophageal CA on chemo recently admitted for PICC line placement for chemo who states he developed fever yesterday and this morning represented with fever of 102.3 as well as cough with sputum production admitted with sepsis sec to aspiration pna and suspected occluded esophageal stent, acute respiratory failure with hypoxia and acute on chronic kidney disease stage 3. Patient is s.p 1 unit prbc     Problem/Plan - 1:  ·  Problem: Sepsis with acute hypoxic respiratory failure and septic shock, due to unspecified organism.  Plan: -acute, sec to asp pna from poss occluded stent  -cont iv abx  --> blood cx neg x 3     Problem/Plan - 2:  ·  Problem: Pneumonia.  Plan: -aspiration   -plan as above.   - speech consult appreciated     Problem/Plan - 3:  ·  Problem: anemia - improved  --> s.p 1 unit prbc     Problem/Plan - 4:  ·  Problem: Hyponatremia. improved     Problem/Plan - 5:  ·  Problem: Esophageal cancer.  Plan: acute on chronic  currently undergoing chemo via PICC  - outpatient follow up      Problem/Plan - 6:  Problem: HTN (hypertension). Plan: chronic, stable  cont home meds with parameters     Problem/Plan - 7:  ·  Problem: Type 2 diabetes mellitus without complication, without long-term current use of insulin.  -   -a1c is 6  --> dc ssi       long term prognosis poor  spoke to sister yesterday, wants hospice eval and long term placement

## 2020-08-24 NOTE — PROGRESS NOTE ADULT - ASSESSMENT
60 yo M with pmh DM2, HTN, HLD, Esophageal CA on chemo recently admitted for PICC line placement for chemo who states he developed fever yesterday and this morning represented with fever of 102.3 as well as cough with sputum production admitted with sepsis sec to aspiration pna and suspected occluded esophageal stent, acute respiratory failure with hypoxia and acute on chronic kidney disease stage 3. Patient is s.p 1 unit prbc     Problem/Plan - 1:  ·  Problem: Sepsis with acute hypoxic respiratory failure and septic shock, due to unspecified organism.  Plan: -acute, sec to asp pna with question of  occluded stent  -cont iv abx  --> blood cx neg x 3  --> Esophagram today shows patent stent.     Problem/Plan - 2:  ·  Problem: Pneumonia.  Plan: -aspiration   -plan as above.   -Small amount of residual barium in the cervical esophagus; patient began coughing during the study, possibly secondary to aspiration of barium; a modified barium swallow is recommended for further evaluation. SLP reconsulted.      Problem/Plan - 3:  ·  Problem: anemia - improved  --> s.p 1 unit prbc     Problem/Plan - 4:  ·  Problem: Hyponatremia. improved     Problem/Plan - 5:  ·  Problem: Esophageal cancer.  Plan: acute on chronic  currently undergoing chemo via PICC  - outpatient follow up      Problem/Plan - 6:  Problem: HTN (hypertension). Plan: chronic, stable  cont home meds with parameters     Problem/Plan - 7:  ·  Problem: Type 2 diabetes mellitus without complication, without long-term current use of insulin.  -   -a1c is 6  --> dc ssi     Dispo: Palliative consult, sister asking for Hospice eval however plan is for additional chemotherapy once he recovers from infection / primary oncologist is Dr. Santiago 58 yo M with pmh DM2, HTN, HLD, Esophageal CA on chemo recently admitted for PICC line placement for chemo who states he developed fever yesterday and this morning represented with fever of 102.3 as well as cough with sputum production admitted with sepsis sec to aspiration pna and suspected occluded esophageal stent, acute respiratory failure with hypoxia and acute on chronic kidney disease stage 3. Patient is s.p 1 unit prbc     Problem/Plan - 1:  ·  Problem: Sepsis with acute hypoxic respiratory failure and septic shock, due to unspecified organism.  Plan: -acute, sec to asp pna with question of  occluded stent  -cont iv abx  --> blood cx neg x 3  --> Esophagram today shows patent stent.     Problem/Plan - 2:  ·  Problem: Pneumonia.  Plan: -aspiration   -plan as above.   -Small amount of residual barium in the cervical esophagus; patient began coughing during the study, possibly secondary to aspiration of barium; a modified barium swallow is recommended for further evaluation. SLP reconsulted.      Problem/Plan - 3:  ·  Problem: anemia - improved  --> s.p 1 unit prbc     Problem/Plan - 4:  ·  Problem: Hyponatremia. improved     Problem/Plan - 5:  ·  Problem: Esophageal cancer.  Plan: acute on chronic  currently undergoing chemo via PICC  - outpatient follow up      Problem/Plan - 6:  Problem: HTN (hypertension). Plan: chronic, stable  cont home meds with parameters     Problem/Plan - 7:  ·  Problem: Type 2 diabetes mellitus without complication, without long-term current use of insulin.  -   -a1c is 6  --> dc ssi     Dispo: Palliative consult, sister asking for Hospice eval, according to sister there are no further plans for chemo.

## 2020-08-24 NOTE — CONSULT NOTE ADULT - ASSESSMENT
59M with poorly differentiated esophageal cancer, s/p stent 5/20, s/p chemo, admitted with pneumonia, possible occluded stent, also with acute on CKD, s/p respiratory failure now on room air breathing comfortably.     #1 59M with poorly differentiated esophageal cancer, s/p stent 5/20, s/p chemo, admitted with pneumonia, possible occluded stent, also with acute on CKD, s/p respiratory failure now on room air breathing comfortably.     #1 Esophageal cancer - at this time, patient has been too debilitated and struggling with bouts of recurrent infections and dehydration, family has opted to stop chemotherapy and anti neoplastic treatments at this time. 59M with poorly differentiated esophageal cancer, s/p stent 5/20, s/p chemo, admitted with pneumonia, possible occluded stent, also with acute on CKD, s/p respiratory failure now on room air breathing comfortably.     #1 Esophageal cancer - at this time, patient has been too debilitated and struggling with bouts of recurrent infections and dehydration, family has opted to stop chemotherapy and anti neoplastic treatments at this time.   #2 pnuemonia - to complete 7 day course of anitbiotics per primary team. stent patent and not occluded.   #3 Dehydration - spoke with sister at length by phone, expressed that at a certain point, we will have to make a decision on burdens of coming back and forth to the hospital for IV hydration versus just focusing on symptom management and comfort.  #4 Palliative care encounter - discussed with sister Rashmi by phone, see goals of care note. Patient CANNOT return home with 86 year old mother. Patient has a mental slowness and does NOT have decisional capacity. Sister Rashmi is next of kin and decision maker at this time. She is looking into getting him into [facility] for more supervision since he cannot return home at this time.

## 2020-08-24 NOTE — PHYSICAL THERAPY INITIAL EVALUATION ADULT - GAIT DISTANCE, PT EVAL
MRN: [id number], Earlene Alcantar is a 51 year old female     Subjective     Denies complaints; Wants to go home  I/O's    Intake/Output Summary (Last 24 hours) at 7/3/2020 1128  Last data filed at 7/3/2020 1100  Gross per 24 hour   Intake 760 ml   Output 1600 ml   Net -840 ml       Last Recorded Vitals  Blood pressure 130/75, pulse 79, temperature 97.3 °F (36.3 °C), temperature source Oral, resp. rate 17, height 5' 10\" (1.778 m), weight (!) 163.9 kg (361 lb 5.3 oz), last menstrual period 11/13/2019, SpO2 92 %.  Body mass index is 51.85 kg/m².    Physical Exam   Constitutional: She is oriented to person, place, and time. She appears well-developed and well-nourished.   Morbidly obese   HENT:   Head: Normocephalic.   Right Ear: External ear normal.   Left Ear: External ear normal.   Nose: Nose normal.   Eyes: Right eye exhibits no discharge. Left eye exhibits no discharge.   Neck: Neck supple.   Cardiovascular: Normal rate, regular rhythm, normal heart sounds and intact distal pulses.   Pulmonary/Chest: Effort normal and breath sounds normal.   Abdominal: Soft.   Genitourinary:    Genitourinary Comments: Exam Deferred     Musculoskeletal:         General: No edema.   Neurological: She is alert and oriented to person, place, and time.   Skin: Skin is warm and dry.   Psychiatric: She has a normal mood and affect.   Nursing note and vitals reviewed.         Meds  Current Facility-Administered Medications   Medication Dose Route Frequency Provider Last Rate Last Dose   • [START ON 7/4/2020] furosemide (LASIX) tablet 40 mg  40 mg Oral Daily Katrina C May, APNP       • hydrALAZINE (APRESOLINE) injection 10 mg  10 mg Intravenous Q6H PRN Katrina C May, APNP       • metoPROLOL (LOPRESSOR) injection 5 mg  5 mg Intravenous Q6H PRN Katrina C May, APNP       • metoPROLOL tartrate (LOPRESSOR) tablet 50 mg  50 mg Oral 2 times per day Katrina C May, APNP   50 mg at 07/03/20 0838   • sodium chloride 0.9 % flush bag 25 mL  25 mL  Intravenous PRN Maria Luisa Delgado MD       • sodium chloride (PF) 0.9 % injection 2 mL  2 mL Intracatheter 2 times per day Maria Luisa Delgado MD   2 mL at 07/03/20 0840   • sodium chloride (NORMAL SALINE) 0.9 % bolus 500 mL  500 mL Intravenous PRN Maria Luisa Delgado MD       • acetaminophen (TYLENOL) tablet 650 mg  650 mg Oral Q4H PRN Maria Luisa Delgado MD       • docusate sodium (COLACE) capsule 100 mg  100 mg Oral Daily Maria Luisa Delgado MD       • ondansetron (ZOFRAN) injection 4 mg  4 mg Intravenous Q12H PRN Maria Luisa Delgado MD       • ipratropium-albuterol (DUONEB) 0.5-2.5 (3) MG/3ML nebulizer solution 3 mL  3 mL Nebulization 4x Daily Resp Maria Luisa Delgado MD   3 mL at 07/03/20 0717   • pantoprazole (PROTONIX) EC tablet 40 mg  40 mg Oral Nightly Maria Luisa Delgado MD   40 mg at 07/02/20 2037   • dextrose 50 % injection 25 g  25 g Intravenous PRN Maria Luisa Delgado MD       • dextrose 50 % injection 12.5 g  12.5 g Intravenous PRN Maria Luisa Delgado MD       • dextrose 5 % infusion   Intravenous Continuous PRN Maria Luisa Delgado MD       • glucagon (GLUCAGEN) injection 1 mg  1 mg Intramuscular PRN Maria Luisa Delgado MD       • dextrose (GLUTOSE) 40 % gel 15 g  15 g Oral PRN Maria Luisa Delgado MD       • dextrose (GLUTOSE) 40 % gel 30 g  30 g Oral PRN Maria Luisa Delgado MD       • insulin lispro (HumaLOG) scheduled dose AND correction dose   Subcutaneous 4x Daily AC & HS Maria Luisa Delgado MD       • allopurinol (ZYLOPRIM) tablet 100 mg  100 mg Oral Daily Maria Luisa Delgado MD   100 mg at 07/03/20 0838   • budesonide-formoterol (SYMBICORT) 160-4.5 MCG/ACT inhaler 2 puff  2 puff Inhalation BID Maria Luisa Delgado MD   2 puff at 07/03/20 0839   • busPIRone (BUSPAR) tablet 15 mg  15 mg Oral BID Maria Luisa Delgado MD   15 mg at 07/03/20 0837   • LORazepam (ATIVAN) tablet 1 mg  1 mg Oral Q8H PRN Maria Luisa Delgado MD   1 mg at 07/03/20 0853   • atorvastatin (LIPITOR) tablet 80 mg  80 mg Oral Nightly Maria Luisa Delgado MD   80 mg at 07/02/20 2037   • spironolactone (ALDACTONE) tablet 25 mg  25 mg Oral Daily  Maria Luisa Delgado MD   25 mg at 07/03/20 0838   • topiramate (TOPAMAX) tablet 100 mg  100 mg Oral BID Maria Luisa Delgado MD   100 mg at 07/03/20 0837   • HYDROcodone-acetaminophen (NORCO) 5-325 MG per tablet 1 tablet  1 tablet Oral Q4H PRN Santosh Malik MD   1 tablet at 07/03/20 0618          Labs   Recent Labs   Lab 07/03/20  0605 07/02/20  1545   SODIUM 144 144   POTASSIUM 3.6 3.7   CHLORIDE 109* 111*   CO2 27 27   BUN 12 10   CREATININE 0.76 0.81   GLUCOSE 116* 113*   CALCIUM 8.6 8.9      Recent Labs   Lab 07/03/20  0605 07/02/20  1545   SODIUM 144 144   CHLORIDE 109* 111*   CO2 27 27   BUN 12 10   CREATININE 0.76 0.81   CALCIUM 8.6 8.9   ALBUMIN 2.6* 2.8*   BILIRUBIN 0.4 0.1*   ALKPT 113 110   GPT 21 23   AST 17 15   GLUCOSE 116* 113*      Recent Labs   Lab 07/03/20  0605   WBC 6.9   RBC 4.78   HGB 12.1   HCT 41.5          Imaging  LAST ECHO/ECHO STRESS:  No procedure found.    LAST EKG:  Encounter Date: 07/02/20   Electrocardiogram 12-Lead   Result Value    Ventricular Rate EKG/Min (BPM) 114    Atrial Rate (BPM) 114    MN-Interval (MSEC) 164    QRS-Interval (MSEC) 84    QT-Interval (MSEC) 342    QTc 471    P Axis (Degrees) 50    R Axis (Degrees) 26    T Axis (Degrees) 46    REPORT TEXT      Sinus tachycardia  Low voltage QRS  Borderline ECG  No previous ECGs available  Confirmed by KEVIN OLGUIN, [facility] ([id number]) on 7/2/2020 4:59:46 PM         Assessment & Plan     Assessment & Plan      IMP  Pt presented to ER 7/2/2020 with tachypneaic / tachycardia  Sinus tachycardia; patient states she did not take her metoprolol 7/2 am, likely cause; HR currently 85 on lopressor PO  Supra therapeutic INR; currently 7; Patient did not have INR checked for approximately 3 months due to Covid-19  pandemic  CAD (cardiac Cath 5/14/2018 Moderate non-obstructive CAD)  hx of PEA arrest in 2012 (s/p trach, g-tube)  HTN  Morbid Obesity     REC  Monitor on tele while hospitalized  Check echo; to be completed  Pt received 5mg Vitamin K 7/2 ;  INR was not ordered for this morning; STAT INR; [place] / INR management per primary  Patient is compensated;  Stop IV diuresis.   Start furosemide 40mg QD tomorrow  lopressor 50 BID  IV lopressor PRN  Continue medical Rx and risk factor modification    INR management per primary.  INR level pending.  Echo to be completed    From a cardiology standpoint, if echo remains stable, patient can be discharged.  Follow up with cardiologist Dr Raghu Allred in 2 weeks.        SIOBHAN Rabago  Cardiology Nurse Practitioner              [address] feet

## 2020-08-24 NOTE — PROVIDER CONTACT NOTE (OTHER) - SITUATION
made aware pt has double lumen picc line. one line designated for chemo. as per md may flush non chemo port  do not touch chemo port. use peripheral line first

## 2020-08-24 NOTE — CONSULT NOTE ADULT - SUBJECTIVE AND OBJECTIVE BOX
HPI: 59M with PMH as listed admitted 8/21 with fever, sputum production, and losing his voice. Patient with hypoxic respiratory failure, pneumonia, possibly occluded esophageal stent. Also with ISIDRO on CKD.     PERTINENT PMH REVIEWED: Yes     PAST MEDICAL & SURGICAL HISTORY:  S/P PICC central line placement  HTN (hypertension)  HLD (hyperlipidemia)  History of gastroesophageal reflux (GERD)  Esophageal cancer: stent placement May 2020  Diabetes: type 2  H/O colonoscopy  History of cancer chemotherapy    SOCIAL HISTORY:  no EtOH or smoking                                    Admitted from:  home     Surrogate - mother -     FAMILY HISTORY:  FHx: throat cancer: father, former heavy smoker  FHx: diabetes mellitus: maternal grandmother    Baseline ADLs (prior to admission):  Independent/ Dependent      Allergies    Allergy Status Unknown    Intolerances    no vegetables and no bread (Unknown)  pt vanilla glucerna only (Other)  requests tuna salad (Unknown)    Present Symptoms:     Dyspnea: 0 1 2 3   Nausea/Vomiting: Yes No  Anxiety:  Yes No  Depression: Yes No  Fatigue: Yes No  Loss of appetite: Yes No    Pain:             Character-            Duration-            Effect-            Factors-            Frequency-            Location-            Severity-    Review of Systems: Reviewed                     Negative:                     Positive:  Unable to obtain due to poor mentation   All others negative    MEDICATIONS  (STANDING):  atorvastatin 10 milliGRAM(s) Oral at bedtime  enoxaparin Injectable 40 milliGRAM(s) SubCutaneous daily  metoprolol succinate ER 50 milliGRAM(s) Oral daily  pantoprazole  Injectable 40 milliGRAM(s) IV Push every 12 hours  piperacillin/tazobactam IVPB.. 3.375 Gram(s) IV Intermittent every 8 hours  sucralfate 1 Gram(s) Oral two times a day  vancomycin  IVPB 1000 milliGRAM(s) IV Intermittent every 24 hours    MEDICATIONS  (PRN):  lactulose Syrup 20 Gram(s) Oral daily PRN constipation  ondansetron   Disintegrating Tablet 8 milliGRAM(s) Oral every 8 hours PRN for nausea  oxyCODONE    IR 10 milliGRAM(s) Oral every 6 hours PRN Severe Pain (7 - 10)  prochlorperazine   Tablet 10 milliGRAM(s) Oral every 6 hours PRN for nausea      PHYSICAL EXAM:    Vital Signs Last 24 Hrs  T(C): 37 (24 Aug 2020 08:00), Max: 37 (24 Aug 2020 08:00)  T(F): 98.6 (24 Aug 2020 08:00), Max: 98.6 (24 Aug 2020 08:00)  HR: 100 (24 Aug 2020 08:00) (98 - 108)  BP: 110/68 (24 Aug 2020 08:00) (100/67 - 118/77)  BP(mean): --  RR: 18 (24 Aug 2020 08:00) (18 - 18)  SpO2: 95% (24 Aug 2020 08:00) (95% - 95%)    General: alert  oriented x ____ lethargic agitated                  cachexia  nonverbal  coma    Karnofsky:  %    HEENT: normal  dry mouth  ET tube/trach    Lungs: comfortable tachypnea/labored breathing  excessive secretions    CV: normal  tachycardia    GI: normal  distended  tender  no BS               PEG/NG/OG tube  constipation  last BM:     : normal  incontinent  oliguria/anuria  cheng    MSK: normal  weakness  edema             ambulatory  bedbound/wheelchair bound    Skin: normal  pressure ulcers- Stage_____  no rash    LABS:                      7.7    9.09  )-----------( 185      ( 23 Aug 2020 08:37 )             23.2     08-23    134<L>  |  99  |  23.0<H>  ----------------------------<  190<H>  4.1   |  23.0  |  1.47<H>    Ca    9.2      23 Aug 2020 08:34  Mg     1.7     08-23    TPro  6.5<L>  /  Alb  2.9<L>  /  TBili  0.2<L>  /  DBili  x   /  AST  14  /  ALT  11  /  AlkPhos  77  08-23    I&O's Summary    23 Aug 2020 07:01  -  24 Aug 2020 07:00  --------------------------------------------------------  IN: 250 mL / OUT: 1450 mL / NET: -1200 mL    RADIOLOGY & ADDITIONAL STUDIES:    < from: CT Abdomen and Pelvis No Cont (08.21.20 @ 12:13) >    IMPRESSION:  Worsening multifocal pneumonia since prior CT of 8/13/2020. Please refer to dedicated CT chest performed earlier today.    Gastric distention with fluid.    Distal esophageal stent is unchanged in location, traversing the patient's known gastroesophagealmass. Fluid in the distal esophagus; recommend aspiration precautions.    < end of copied text >    ADVANCE DIRECTIVES: full code HPI: 59M with PMH as listed admitted 8/21 with fever, sputum production, and losing his voice. Patient with hypoxic respiratory failure, pneumonia, possibly occluded esophageal stent. Also with ISIDRO on CKD.     Multiple readmissions since cancer diagnosis 6/25 - 6/26. 7/30 - 8/1. 8/13 - 18.     PERTINENT PMH REVIEWED: Yes     PAST MEDICAL & SURGICAL HISTORY:  S/P PICC central line placement  HTN (hypertension)  HLD (hyperlipidemia)  History of gastroesophageal reflux (GERD)  Esophageal cancer: stent placement May 2020  Diabetes: type 2  H/O colonoscopy  History of cancer chemotherapy    SOCIAL HISTORY:  no EtOH or smoking                                    Admitted from:  home     Surrogate - mother - Joann. sister Rashmi      FAMILY HISTORY:  FHx: throat cancer: father, former heavy smoker  FHx: diabetes mellitus: maternal grandmother    Baseline ADLs (prior to admission):  Independent     Allergies    Allergy Status Unknown    Intolerances    no vegetables and no bread (Unknown)  pt vanilla glucerna only (Other)  requests tuna salad (Unknown)    Present Symptoms:     Dyspnea: 0   Nausea/Vomiting: No  Anxiety:  No  Depression: no   Fatigue: yes   Loss of appetite: yes     Pain: none currently             Character-            Duration-            Effect-            Factors-            Frequency-            Location-            Severity-    Review of Systems: Reviewed                     Negative: no chest pain                    All others negative    MEDICATIONS  (STANDING):  atorvastatin 10 milliGRAM(s) Oral at bedtime  enoxaparin Injectable 40 milliGRAM(s) SubCutaneous daily  metoprolol succinate ER 50 milliGRAM(s) Oral daily  pantoprazole  Injectable 40 milliGRAM(s) IV Push every 12 hours  piperacillin/tazobactam IVPB.. 3.375 Gram(s) IV Intermittent every 8 hours  sucralfate 1 Gram(s) Oral two times a day  vancomycin  IVPB 1000 milliGRAM(s) IV Intermittent every 24 hours    MEDICATIONS  (PRN):  lactulose Syrup 20 Gram(s) Oral daily PRN constipation  ondansetron   Disintegrating Tablet 8 milliGRAM(s) Oral every 8 hours PRN for nausea  oxyCODONE    IR 10 milliGRAM(s) Oral every 6 hours PRN Severe Pain (7 - 10)  prochlorperazine   Tablet 10 milliGRAM(s) Oral every 6 hours PRN for nausea    PHYSICAL EXAM:    Vital Signs Last 24 Hrs  T(C): 37 (24 Aug 2020 08:00), Max: 37 (24 Aug 2020 08:00)  T(F): 98.6 (24 Aug 2020 08:00), Max: 98.6 (24 Aug 2020 08:00)  HR: 100 (24 Aug 2020 08:00) (98 - 108)  BP: 110/68 (24 Aug 2020 08:00) (100/67 - 118/77)  BP(mean): --  RR: 18 (24 Aug 2020 08:00) (18 - 18)  SpO2: 95% (24 Aug 2020 08:00) (95% - 95%)    General: alert      Karnofsky: 20 %    HEENT: normal      Lungs: comfortable     CV: normal      GI: normal     : normal      MSK: weakness      Skin: no rash    LABS:                      7.7    9.09  )-----------( 185      ( 23 Aug 2020 08:37 )             23.2     08-23    134<L>  |  99  |  23.0<H>  ----------------------------<  190<H>  4.1   |  23.0  |  1.47<H>    Ca    9.2      23 Aug 2020 08:34  Mg     1.7     08-23    TPro  6.5<L>  /  Alb  2.9<L>  /  TBili  0.2<L>  /  DBili  x   /  AST  14  /  ALT  11  /  AlkPhos  77  08-23    I&O's Summary    23 Aug 2020 07:01  -  24 Aug 2020 07:00  --------------------------------------------------------  IN: 250 mL / OUT: 1450 mL / NET: -1200 mL    RADIOLOGY & ADDITIONAL STUDIES:    < from: CT Abdomen and Pelvis No Cont (08.21.20 @ 12:13) >    IMPRESSION:  Worsening multifocal pneumonia since prior CT of 8/13/2020. Please refer to dedicated CT chest performed earlier today.    Gastric distention with fluid.    Distal esophageal stent is unchanged in location, traversing the patient's known gastroesophageal mass. Fluid in the distal esophagus; recommend aspiration precautions.    < end of copied text >    ADVANCE DIRECTIVES: full code --> now DNR/I HPI: 59M with PMH as listed admitted 8/21 with fever, sputum production, and losing his voice. Patient with hypoxic respiratory failure, pneumonia, possibly occluded esophageal stent. Also with ISIDRO on CKD.     Multiple readmissions since cancer diagnosis 6/25 - 6/26. 7/30 - 8/1. 8/13 - 18. recent chemo carbo/taxol on June 30th.     PERTINENT PMH REVIEWED: Yes     PAST MEDICAL & SURGICAL HISTORY:  S/P PICC central line placement  HTN (hypertension)  HLD (hyperlipidemia)  History of gastroesophageal reflux (GERD)  Esophageal cancer: stent placement May 2020  Diabetes: type 2  H/O colonoscopy  History of cancer chemotherapy    SOCIAL HISTORY:  no EtOH or smoking                                    Admitted from:  home     Surrogate - mother - Joann. sister Rashmi      FAMILY HISTORY:  FHx: throat cancer: father, former heavy smoker  FHx: diabetes mellitus: maternal grandmother    Baseline ADLs (prior to admission):  Independent     Allergies    Allergy Status Unknown    Intolerances    no vegetables and no bread (Unknown)  pt vanilla glucerna only (Other)  requests tuna salad (Unknown)    Present Symptoms:     Dyspnea: 0   Nausea/Vomiting: No  Anxiety:  No  Depression: no   Fatigue: yes   Loss of appetite: yes     Pain: none currently             Character-            Duration-            Effect-            Factors-            Frequency-            Location-            Severity-    Review of Systems: Reviewed                     Negative: no chest pain                    All others negative    MEDICATIONS  (STANDING):  atorvastatin 10 milliGRAM(s) Oral at bedtime  enoxaparin Injectable 40 milliGRAM(s) SubCutaneous daily  metoprolol succinate ER 50 milliGRAM(s) Oral daily  pantoprazole  Injectable 40 milliGRAM(s) IV Push every 12 hours  piperacillin/tazobactam IVPB.. 3.375 Gram(s) IV Intermittent every 8 hours  sucralfate 1 Gram(s) Oral two times a day  vancomycin  IVPB 1000 milliGRAM(s) IV Intermittent every 24 hours    MEDICATIONS  (PRN):  lactulose Syrup 20 Gram(s) Oral daily PRN constipation  ondansetron   Disintegrating Tablet 8 milliGRAM(s) Oral every 8 hours PRN for nausea  oxyCODONE    IR 10 milliGRAM(s) Oral every 6 hours PRN Severe Pain (7 - 10)  prochlorperazine   Tablet 10 milliGRAM(s) Oral every 6 hours PRN for nausea    PHYSICAL EXAM:    Vital Signs Last 24 Hrs  T(C): 37 (24 Aug 2020 08:00), Max: 37 (24 Aug 2020 08:00)  T(F): 98.6 (24 Aug 2020 08:00), Max: 98.6 (24 Aug 2020 08:00)  HR: 100 (24 Aug 2020 08:00) (98 - 108)  BP: 110/68 (24 Aug 2020 08:00) (100/67 - 118/77)  BP(mean): --  RR: 18 (24 Aug 2020 08:00) (18 - 18)  SpO2: 95% (24 Aug 2020 08:00) (95% - 95%)    General: alert      Karnofsky: 20 %    HEENT: normal      Lungs: comfortable     CV: normal      GI: normal     : normal      MSK: weakness      Skin: no rash    LABS:                      7.7    9.09  )-----------( 185      ( 23 Aug 2020 08:37 )             23.2     08-23    134<L>  |  99  |  23.0<H>  ----------------------------<  190<H>  4.1   |  23.0  |  1.47<H>    Ca    9.2      23 Aug 2020 08:34  Mg     1.7     08-23    TPro  6.5<L>  /  Alb  2.9<L>  /  TBili  0.2<L>  /  DBili  x   /  AST  14  /  ALT  11  /  AlkPhos  77  08-23    I&O's Summary    23 Aug 2020 07:01  -  24 Aug 2020 07:00  --------------------------------------------------------  IN: 250 mL / OUT: 1450 mL / NET: -1200 mL    RADIOLOGY & ADDITIONAL STUDIES:    < from: CT Abdomen and Pelvis No Cont (08.21.20 @ 12:13) >    IMPRESSION:  Worsening multifocal pneumonia since prior CT of 8/13/2020. Please refer to dedicated CT chest performed earlier today.    Gastric distention with fluid.    Distal esophageal stent is unchanged in location, traversing the patient's known gastroesophageal mass. Fluid in the distal esophagus; recommend aspiration precautions.    < end of copied text >    ADVANCE DIRECTIVES: full code --> now DNR/I

## 2020-08-24 NOTE — PHYSICAL THERAPY INITIAL EVALUATION ADULT - ADDITIONAL COMMENTS
Pt reports independent PTA, owns no DME. Pt lives with 85 y/o mother who is in good health, Sister (Rashmi) prepares meals and shops for pt and mother. Pt does not drive, sister assists with doctor appts.

## 2020-08-24 NOTE — PROGRESS NOTE ADULT - SUBJECTIVE AND OBJECTIVE BOX
CC: fever, cough/Esophageal Cancer    INTERVAL HPI/OVERNIGHT EVENTS: Patient seen and examined. Esophagram today. Denies chest pain, SOB, dizziness, nausea, vomiting, fever, chills. Denies difficulty managing current diet.     Vital Signs Last 24 Hrs  T(C): 37 (24 Aug 2020 08:00), Max: 37 (24 Aug 2020 08:00)  T(F): 98.6 (24 Aug 2020 08:00), Max: 98.6 (24 Aug 2020 08:00)  HR: 100 (24 Aug 2020 08:00) (98 - 108)  BP: 110/68 (24 Aug 2020 08:00) (100/67 - 118/77)  BP(mean): --  RR: 18 (24 Aug 2020 08:00) (18 - 18)  SpO2: 95% (24 Aug 2020 08:00) (95% - 95%)      PHYSICAL EXAM:    GENERAL: NAD,   HEENT: PERRL, +EOMI  NECK: soft, Supple, No JVD,   CHEST/LUNG: decreased BS otherwise clear  HEART: S1S2+, Regular rate and rhythm; No murmurs, rubs, or gallops  ABDOMEN: Soft, Nontender, Nondistended; Bowel sounds present  EXTREMITIES:  2+ Peripheral Pulses, No clubbing, cyanosis, or edema  SKIN: No rashes or lesions  NEURO: AAOX3, no focal deficits,     I&O's Detail    23 Aug 2020 07:01  -  24 Aug 2020 07:00  --------------------------------------------------------  IN:    Oral Fluid: 250 mL  Total IN: 250 mL    OUT:    Voided: 1450 mL  Total OUT: 1450 mL    Total NET: -1200 mL                                    7.7    9.09  )-----------( 185      ( 23 Aug 2020 08:37 )             23.2     23 Aug 2020 08:34    134    |  99     |  23.0   ----------------------------<  190    4.1     |  23.0   |  1.47     Ca    9.2        23 Aug 2020 08:34  Mg     1.7       23 Aug 2020 08:34    TPro  6.5    /  Alb  2.9    /  TBili  0.2    /  DBili  x      /  AST  14     /  ALT  11     /  AlkPhos  77     23 Aug 2020 08:34      CAPILLARY BLOOD GLUCOSE        LIVER FUNCTIONS - ( 23 Aug 2020 08:34 )  Alb: 2.9 g/dL / Pro: 6.5 g/dL / ALK PHOS: 77 U/L / ALT: 11 U/L / AST: 14 U/L / GGT: x               MEDICATIONS  (STANDING):  atorvastatin 10 milliGRAM(s) Oral at bedtime  enoxaparin Injectable 40 milliGRAM(s) SubCutaneous daily  metoprolol succinate ER 50 milliGRAM(s) Oral daily  pantoprazole  Injectable 40 milliGRAM(s) IV Push every 12 hours  piperacillin/tazobactam IVPB.. 3.375 Gram(s) IV Intermittent every 8 hours  sucralfate 1 Gram(s) Oral two times a day  vancomycin  IVPB 1000 milliGRAM(s) IV Intermittent every 24 hours    MEDICATIONS  (PRN):  lactulose Syrup 20 Gram(s) Oral daily PRN constipation  ondansetron   Disintegrating Tablet 8 milliGRAM(s) Oral every 8 hours PRN for nausea  oxyCODONE    IR 10 milliGRAM(s) Oral every 6 hours PRN Severe Pain (7 - 10)  prochlorperazine   Tablet 10 milliGRAM(s) Oral every 6 hours PRN for nausea      RADIOLOGY & ADDITIONAL TESTS:

## 2020-08-24 NOTE — PROGRESS NOTE ADULT - SUBJECTIVE AND OBJECTIVE BOX
Chief Complaint: This is a 59y old man patient being seen in follow-up consultation for abdominal distention.    HPI / 24H events:  Patient seeing and evaluated at bedside, reporting no complains , tolerating oral intake, awaiting esophagram this am to verify stent patency. Denies, nausea ,vomiting, constipation, diarrhea ,abdominal pain, chest pain, shortness of breath, melena, hematochezia, hematemesis                                                         .      ROS: A 14-point review of systems was reviewed and was otherwise negative save what was reported in the HPI.    PAST MEDICAL/SURGICAL HISTORY:  S/P PICC central line placement  HTN (hypertension)  HLD (hyperlipidemia)  History of gastroesophageal reflux (GERD)  Esophageal cancer: stent placement May 2020  Diabetes: type 2  H/O colonoscopy  History of cancer chemotherapy    MEDICATIONS  (STANDING):  atorvastatin 10 milliGRAM(s) Oral at bedtime  enoxaparin Injectable 40 milliGRAM(s) SubCutaneous daily  metoprolol succinate ER 50 milliGRAM(s) Oral daily  pantoprazole    Tablet 40 milliGRAM(s) Oral before breakfast  piperacillin/tazobactam IVPB.. 3.375 Gram(s) IV Intermittent every 8 hours  sucralfate 1 Gram(s) Oral two times a day  vancomycin  IVPB 1000 milliGRAM(s) IV Intermittent every 24 hours    MEDICATIONS  (PRN):  lactulose Syrup 20 Gram(s) Oral daily PRN constipation  ondansetron   Disintegrating Tablet 8 milliGRAM(s) Oral every 8 hours PRN for nausea  oxyCODONE    IR 10 milliGRAM(s) Oral every 6 hours PRN Severe Pain (7 - 10)  prochlorperazine   Tablet 10 milliGRAM(s) Oral every 6 hours PRN for nausea    Allergy Status Unknown  no vegetables and no bread (Unknown)  requests tuna salad (Unknown)    T(C): 37 (08-24-20 @ 08:00), Max: 37 (08-24-20 @ 08:00)  HR: 100 (08-24-20 @ 08:00) (98 - 108)  BP: 110/68 (08-24-20 @ 08:00) (100/67 - 118/77)  RR: 18 (08-24-20 @ 08:00) (18 - 18)  SpO2: 95% (08-24-20 @ 08:00) (95% - 95%)    I&O's Summary    23 Aug 2020 07:01  -  24 Aug 2020 07:00  --------------------------------------------------------  IN: 250 mL / OUT: 1450 mL / NET: -1200 mL      PHYSICAL EXAM:  Constitutional: Comfortable, afebrile in no apparent distress  Eyes: Sclerae anicteric, conjunctivae normal  ENMT: Mucus membranes moist, no oropharyngeal thrush noted  Neck: No thyroid nodules appreciated, no significant cervical or supraclavicular lymphadenopathy  Respiratory: Breathing nonlabored; clear to auscultation  Cardiovascular: Regular rate and rhythm  Gastrointestinal: Soft, nontender, nondistended, normoactive bowel sounds; no hepatosplenomegaly appreciated; no rebound tenderness or involuntary guarding  Extremities: No clubbing, cyanosis or edema  Neurological: Alert and oriented to person, place and time.  Skin: No jaundice  Musculoskeletal: No significant peripheral atrophy                   7.7    9.09  )-----------( 185      ( 08-23 @ 08:37 )             23.2                7.1    10.50 )-----------( 133      ( 08-22 @ 09:29 )             22.7                6.5    9.02  )-----------( 198      ( 08-22 @ 07:45 )             20.8       08-23    134<L>  |  99  |  23.0<H>  ----------------------------<  190<H>  4.1   |  23.0  |  1.47<H>    Ca    9.2      23 Aug 2020 08:34  Mg     1.7     08-23    TPro  6.5<L>  /  Alb  2.9<L>  /  TBili  0.2<L>  /  DBili  x   /  AST  14  /  ALT  11  /  AlkPhos  77  08-23    LIVER FUNCTIONS - ( 23 Aug 2020 08:34 )  Alb: 2.9 g/dL / Pro: 6.5 g/dL / ALK PHOS: 77 U/L / ALT: 11 U/L / AST: 14 U/L / GGT: x                   Culture - Urine (collected 22 Aug 2020 01:40)  Source: .Urine Clean Catch (Midstream)  Final Report (22 Aug 2020 23:32):    No growth    Culture - Blood (collected 21 Aug 2020 10:05)  Source: .Blood PICC/PERC Double Lumen BLUE  Preliminary Report (23 Aug 2020 11:01):    No growth at 48 hours    Culture - Blood (collected 21 Aug 2020 10:04)  Source: .Blood Blood-Peripheral  Preliminary Report (23 Aug 2020 11:01):    No growth at 48 hours    Culture - Blood (collected 21 Aug 2020 10:04)  Source: .Blood Blood-Peripheral  Preliminary Report (23 Aug 2020 11:01):    No growth at 48 hours      IMAGING: I personally reviewed the Ct of abdomen and pelvis, and I agree with the radiologist's interpretation as described below:    FINDINGS: Evaluation of the viscera is limited by lack of intravenous contrast.    LOWER CHEST: Central venous catheter tip in the right atrium. Increased bibasilar airspace opacities. Dilated fluid-filled distal esophagus; recommend aspiration precautions. Stable position of a distal esophageal stent traversing the patient's known mass at the GE junction. Distal end of the stent in the gastric lumen.    LIVER: Within normal limits.  BILE DUCTS: Normal caliber.  GALLBLADDER: Within normal limits.  SPLEEN: Within normal limits.  PANCREAS: Within normal limits.  ADRENALS: Within normal limits.  KIDNEYS/URETERS: Stable right lower pole renal cyst. No hydronephrosis.    BLADDER: Within normal limits.  REPRODUCTIVE ORGANS: Prostate within normal limits.    BOWEL: Distended fluid-filled stomach.. Appendix normal. No small bowel obstruction. Diverticulosis coli.  PERITONEUM: No ascites.  VESSELS: Within normal limits.  RETROPERITONEUM/LYMPH NODES: No lymphadenopathy.  ABDOMINAL WALL: Small bilateral fat-containing inguinal hernias.  BONES: Degenerative changes.    IMPRESSION:  Worsening multifocal pneumonia since prior CT of 8/13/2020. Please refer to dedicated CT chest performed earlier today.    Gastric distention with fluid.    Distal esophageal stent is unchanged in location, traversing the patient's known gastroesophagealmass. Fluid in the distal esophagus; recommend aspiration precautions.

## 2020-08-24 NOTE — PROGRESS NOTE ADULT - ASSESSMENT
Patient with esophageal cancer with stent in place and now with dilated esophagus and stomach. No clinical evidence of gastric outlet obstruction.   At this time, continue  IV antibiotics, tolerating Soft diet, IV PPI BID for cytoprotection. Esophagram today to consider stent patency. If stent seems occluded, may need to have EGD with stent removal and replacement. Patient with esophageal cancer with stent in place and now with dilated esophagus and stomach. No clinical evidence of gastric outlet obstruction.   At this time, continue  IV antibiotics, tolerating Soft diet, IV PPI BID for cytoprotection. Esophagram today to consider stent patency. If stent seems occluded, may need to have EGD with stent removal and replacement. Patient must remain in the sitting position for 2 hours after each meal to prevent aspiration.

## 2020-08-24 NOTE — PHYSICAL THERAPY INITIAL EVALUATION ADULT - DIAGNOSIS, PT EVAL
Pt is independent with functional mobility, therefore, does not require acute PT services at this time

## 2020-08-25 NOTE — PROGRESS NOTE ADULT - SUBJECTIVE AND OBJECTIVE BOX
Chief Complaint: This is a 59y old man patient being seen in follow-up consultation for Abdominal distention.    HPI / 24H events:  Patient seeing and evaluated at bedside, reporting no complains , tolerating oral intake, X-Ray Esophagram revealed patent esophageal stent.  Denies, nausea ,vomiting, constipation, diarrhea ,abdominal pain, chest pain, shortness of breath, melena, hematochezia, hematemesis       ROS: A 14-point review of systems was reviewed and was otherwise negative save what was reported in the HPI.    PAST MEDICAL/SURGICAL HISTORY:  S/P PICC central line placement  HTN (hypertension)  HLD (hyperlipidemia)  History of gastroesophageal reflux (GERD)  Esophageal cancer: stent placement May 2020  Diabetes: type 2  H/O colonoscopy  History of cancer chemotherapy    MEDICATIONS  (STANDING):  atorvastatin 10 milliGRAM(s) Oral at bedtime  enoxaparin Injectable 40 milliGRAM(s) SubCutaneous daily  magnesium sulfate  IVPB 2 Gram(s) IV Intermittent every 6 hours  metoprolol succinate ER 50 milliGRAM(s) Oral daily  pantoprazole  Injectable 40 milliGRAM(s) IV Push every 12 hours  piperacillin/tazobactam IVPB.. 3.375 Gram(s) IV Intermittent every 8 hours  sodium chloride 0.9%. 1000 milliLiter(s) (40 mL/Hr) IV Continuous <Continuous>  sucralfate 1 Gram(s) Oral two times a day    MEDICATIONS  (PRN):  lactulose Syrup 20 Gram(s) Oral daily PRN constipation  ondansetron   Disintegrating Tablet 8 milliGRAM(s) Oral every 8 hours PRN for nausea  oxyCODONE    IR 10 milliGRAM(s) Oral every 6 hours PRN Severe Pain (7 - 10)  prochlorperazine   Tablet 10 milliGRAM(s) Oral every 6 hours PRN for nausea    Allergy Status Unknown  glucerna vanilla and strawberry only (Other)  no vegetables and no bread (Unknown)  requests tuna salad (Unknown)    T(C): 36.8 (08-25-20 @ 08:10), Max: 36.8 (08-24-20 @ 21:53)  HR: 102 (08-25-20 @ 08:10) (102 - 111)  BP: 120/78 (08-25-20 @ 08:10) (120/78 - 133/86)  RR: 18 (08-25-20 @ 08:10) (18 - 20)  SpO2: 97% (08-25-20 @ 08:10) (93% - 97%)    I&O's Summary    PHYSICAL EXAM:  Constitutional: Well-developed, well-nourished, in no apparent distress  Eyes: Sclerae anicteric, conjunctivae normal  ENMT: Mucus membranes moist, no oropharyngeal thrush noted  Neck: No thyroid nodules appreciated, no significant cervical or supraclavicular lymphadenopathy  Respiratory: Breathing nonlabored; clear to auscultation  Cardiovascular: Regular rate and rhythm  Gastrointestinal: Soft, nontender, nondistended, normoactive bowel sounds; no hepatosplenomegaly appreciated; no rebound tenderness or involuntary guarding  Rectal: Perianal exam within normal limits; normal sphincter tone; brown stool on glove  Extremities: No clubbing, cyanosis or edema  Neurological: Alert and oriented to person, place and time; no asterixis  Skin: No jaundice  Lymph Nodes: No significant lymphadenopathy  Musculoskeletal: No significant peripheral atrophy  Psychiatric: Affect and mood appropriate                   8.0    8.06  )-----------( 245      ( 08-25 @ 07:12 )             24.8                7.7    9.09  )-----------( 185      ( 08-23 @ 08:37 )             23.2       08-25    134<L>  |  96<L>  |  12.0  ----------------------------<  180<H>  4.0   |  26.0  |  1.34<H>    Ca    9.4      25 Aug 2020 07:12  Phos  3.5     08-25  Mg     1.3     08-25      IMAGING: I personally reviewed the XR ESOPH SNGL CON STUDY and I agree with the radiologist's interpretation as described below:  FINDINGS:    A  view demonstrates a partially imaged central on the tip overlying the SVC and an esophageal stent. Patchy opacity at the right lung base.    Barium traversed the esophagus including the distal esophagus to esophageal stent without evidence of obstruction. There is a mild delay and some of the barium traversing the stent with some of the barium seen along the superolateral and superior posterior aspect of the stent before traversing through the stent, however did not leak outside the esophagus.    A small amount of residual barium was noted in the cervical esophagus and the patient began coughing during one of the swallowing attempts with possible aspiration of barium.    IMPRESSION:    Patent esophageal stent.    Mild delay in some of the barium traversing the stent.    Irregularity involving the esophagus just proximal to the stent as described above; no leak of contrast seen outside the esophagus.    Small amount of residual barium in the cervical esophagus; patient began coughing during the study, possibly secondary to aspiration of barium; a modified barium swallow is recommended for further evaluation. Chief Complaint: This is a 59y old man patient being seen in follow-up consultation for Abdominal distention.    HPI / 24H events:  Patient seeing and evaluated at bedside, reporting no complains , tolerating oral intake, X-Ray Esophagram revealed patent esophageal stent.  Denies, nausea ,vomiting, constipation, diarrhea ,abdominal pain, chest pain, shortness of breath, melena, hematochezia, hematemesis.  Patient being referred to hospice.    ROS: A 14-point review of systems was reviewed and was otherwise negative save what was reported in the HPI.    PAST MEDICAL/SURGICAL HISTORY:  S/P PICC central line placement  HTN (hypertension)  HLD (hyperlipidemia)  History of gastroesophageal reflux (GERD)  Esophageal cancer: stent placement May 2020  Diabetes: type 2  H/O colonoscopy  History of cancer chemotherapy    MEDICATIONS  (STANDING):  atorvastatin 10 milliGRAM(s) Oral at bedtime  enoxaparin Injectable 40 milliGRAM(s) SubCutaneous daily  magnesium sulfate  IVPB 2 Gram(s) IV Intermittent every 6 hours  metoprolol succinate ER 50 milliGRAM(s) Oral daily  pantoprazole  Injectable 40 milliGRAM(s) IV Push every 12 hours  piperacillin/tazobactam IVPB.. 3.375 Gram(s) IV Intermittent every 8 hours  sodium chloride 0.9%. 1000 milliLiter(s) (40 mL/Hr) IV Continuous <Continuous>  sucralfate 1 Gram(s) Oral two times a day    MEDICATIONS  (PRN):  lactulose Syrup 20 Gram(s) Oral daily PRN constipation  ondansetron   Disintegrating Tablet 8 milliGRAM(s) Oral every 8 hours PRN for nausea  oxyCODONE    IR 10 milliGRAM(s) Oral every 6 hours PRN Severe Pain (7 - 10)  prochlorperazine   Tablet 10 milliGRAM(s) Oral every 6 hours PRN for nausea    Allergy Status Unknown  glucerna vanilla and strawberry only (Other)  no vegetables and no bread (Unknown)  requests tuna salad (Unknown)    T(C): 36.8 (08-25-20 @ 08:10), Max: 36.8 (08-24-20 @ 21:53)  HR: 102 (08-25-20 @ 08:10) (102 - 111)  BP: 120/78 (08-25-20 @ 08:10) (120/78 - 133/86)  RR: 18 (08-25-20 @ 08:10) (18 - 20)  SpO2: 97% (08-25-20 @ 08:10) (93% - 97%)    I&O's Summary    PHYSICAL EXAM:  Constitutional: Well-developed, well-nourished, in no apparent distress  Eyes: Sclerae anicteric, conjunctivae normal  ENMT: Mucus membranes moist, no oropharyngeal thrush noted  Neck: No thyroid nodules appreciated, no significant cervical or supraclavicular lymphadenopathy  Respiratory: Breathing nonlabored; clear to auscultation  Cardiovascular: Regular rate and rhythm  Gastrointestinal: Soft, nontender, nondistended, normoactive bowel sounds; no hepatosplenomegaly appreciated; no rebound tenderness or involuntary guarding  Rectal: Perianal exam within normal limits; normal sphincter tone; brown stool on glove  Extremities: No clubbing, cyanosis or edema  Neurological: Alert and oriented to person, place and time; no asterixis  Skin: No jaundice  Lymph Nodes: No significant lymphadenopathy  Musculoskeletal: No significant peripheral atrophy  Psychiatric: Affect and mood appropriate                   8.0    8.06  )-----------( 245      ( 08-25 @ 07:12 )             24.8                7.7    9.09  )-----------( 185      ( 08-23 @ 08:37 )             23.2       08-25    134<L>  |  96<L>  |  12.0  ----------------------------<  180<H>  4.0   |  26.0  |  1.34<H>    Ca    9.4      25 Aug 2020 07:12  Phos  3.5     08-25  Mg     1.3     08-25      IMAGING: I personally reviewed the XR ESOPH SNGL CON STUDY and I agree with the radiologist's interpretation as described below:  FINDINGS:    A  view demonstrates a partially imaged central on the tip overlying the SVC and an esophageal stent. Patchy opacity at the right lung base.    Barium traversed the esophagus including the distal esophagus to esophageal stent without evidence of obstruction. There is a mild delay and some of the barium traversing the stent with some of the barium seen along the superolateral and superior posterior aspect of the stent before traversing through the stent, however did not leak outside the esophagus.    A small amount of residual barium was noted in the cervical esophagus and the patient began coughing during one of the swallowing attempts with possible aspiration of barium.    IMPRESSION:    Patent esophageal stent.    Mild delay in some of the barium traversing the stent.    Irregularity involving the esophagus just proximal to the stent as described above; no leak of contrast seen outside the esophagus.    Small amount of residual barium in the cervical esophagus; patient began coughing during the study, possibly secondary to aspiration of barium; a modified barium swallow is recommended for further evaluation.

## 2020-08-25 NOTE — PROGRESS NOTE ADULT - ASSESSMENT
Patient with esophageal cancer with stent in place and now with dilated esophagus and stomach. No clinical evidence of gastric outlet obstruction.  X-Ray Esophagram revealed patent esophageal stent. At this time, continue  IV antibiotics, tolerating Soft diet, IV PPI BID for cytoprotection. Patient must remain in the sitting position for 2 hours after each meal to prevent aspiration. No need for EGD since esophageal stent is patent. Patient with esophageal cancer with stent in place and now with dilated esophagus and stomach. No clinical evidence of gastric outlet obstruction.  X-Ray Esophagram revealed patent esophageal stent. At this time, continue  IV antibiotics, tolerating Soft diet, IV PPI BID for cytoprotection. Patient must remain in the sitting position for 2 hours after each meal to prevent aspiration. No need for EGD since esophageal stent is patent.  Patient electing for hospice care.    GI will sign off.  Please reconsult as needed and call with any questions or concerns.

## 2020-08-25 NOTE — PROGRESS NOTE ADULT - ASSESSMENT
60 yo M with pmh DM2, HTN, HLD, Esophageal CA on chemo recently admitted for PICC line placement for chemo who states he developed fever yesterday and this morning represented with fever of 102.3 as well as cough with sputum production admitted with sepsis sec to aspiration pna and suspected occluded esophageal stent, acute respiratory failure with hypoxia and acute on chronic kidney disease stage 3. Patient is s.p 1 unit prbc     Problem/Plan - 1:  ·  Problem: Sepsis with acute hypoxic respiratory failure and septic shock, due to unspecified organism.  Plan: -acute, sec to asp pna   -cont iv zosyn day 5  --> blood cx neg x 3  --> Esophagram showed patent stent. Small amount of residual barium in the cervical esophagus; patient began coughing during the study, possibly secondary to aspiration of barium; a modified barium swallow is recommended for further evaluation.  Radiology recommended MBS as pt had coughing episodes while swallowing contrast. S & S re-eval called. MBS in am.     Problem/Plan - 2:  ·  Problem: Pneumonia.  Plan: -aspiration   -plan as above.        Problem/Plan - 3:  ·  Problem: anemia - improved  --> s.p 1 unit prbc     Problem/Plan - 4:  ·  Problem: Hyponatremia. improved     Problem/Plan - 5:  ·  Problem: Esophageal cancer stage 4  currently undergoing chemo via PICC. No plans for further chemo as epr pt & sister  - outpatient follow up      Problem/Plan - 6:  Problem: HTN (hypertension). Plan: chronic, stable  cont home meds with parameters     Problem/Plan - 7:  ·  Problem: Type 2 diabetes mellitus without complication, without long-term current use of insulin.  -   -a1c is 6  --> dc ssi     Dispo: Palliative consult, sister asking for Hospice eval, according to sister there are no further plans for chemo. Sister deciding on [facility] vs Assisted living with private pay hospice. Awaiting MBS

## 2020-08-25 NOTE — PROGRESS NOTE ADULT - ASSESSMENT
59M with poorly differentiated esophageal cancer, s/p stent 5/20, s/p chemo, admitted with pneumonia, possible occluded stent, also with acute on CKD, s/p respiratory failure now on room air breathing comfortably.     #1 Esophageal cancer - at this time, patient has been too debilitated and struggling with bouts of recurrent infections and dehydration, family has opted to stop chemotherapy and anti neoplastic treatments at this time.   #2 pnuemonia - to complete 7 day course of anitbiotics per primary team. stent patent and not occluded.   #3 Dehydration - spoke with sister at length by phone, expressed that at a certain point, we will have to make a decision on burdens of coming back and forth to the hospital for IV hydration versus just focusing on symptom management and comfort.   #4 Palliative care encounter - spoke with sister Fran by phone with [facility] regarding overall situation. Awaiting [facility] to see results. She is looking to get him into [facility] 59M with poorly differentiated esophageal cancer, s/p stent 5/20, s/p chemo, admitted with pneumonia, possible occluded stent, also with acute on CKD, s/p respiratory failure now on room air breathing comfortably.     #1 Esophageal cancer - at this time, patient has been too debilitated and struggling with bouts of recurrent infections and dehydration, family has opted to stop chemotherapy and anti neoplastic treatments at this time.   #2 pnuemonia - to complete 7 day course of anitbiotics per primary team. stent patent and not occluded.   #3 Dehydration - spoke with sister at length by phone, expressed that at a certain point, we will have to make a decision on burdens of coming back and forth to the hospital for IV hydration versus just focusing on symptom management and comfort.   #4 Palliative care encounter - spoke with sister Rashmi by phone with [facility] regarding overall situation. Awaiting MBS to see results. She is looking to get him into [facility], and optimizing his quality of life and comfort. She is not ready to completely stop all treatments in terms of reversible things - antibiotics/fluids - for now. If his MBS shows a major issue in terms of his cancer, recurrent aspiration and there is no intervention that can be done to reverse, then she would consider full comfort measures and maybe placement in a long term facility with comfort care. In the meantime, will have hospice look into if they can provide additional services to him at the assisted living facility.

## 2020-08-25 NOTE — PROGRESS NOTE ADULT - SUBJECTIVE AND OBJECTIVE BOX
OVERNIGHT EVENTS: doing well. going for MBS today     Present Symptoms:     Dyspnea: 0  Nausea/Vomiting: No  Anxiety:  No  Depression: No  Fatigue: No  Loss of appetite: No    Pain: some mild pain in back side, but relieved with pain meds.             Character-            Duration-            Effect-            Factors-            Frequency-            Location-            Severity-    Review of Systems: Reviewed                     Negative: no chest pain            All others negative    MEDICATIONS  (STANDING):  atorvastatin 10 milliGRAM(s) Oral at bedtime  enoxaparin Injectable 40 milliGRAM(s) SubCutaneous daily  magnesium sulfate  IVPB 2 Gram(s) IV Intermittent every 6 hours  metoprolol succinate ER 50 milliGRAM(s) Oral daily  pantoprazole  Injectable 40 milliGRAM(s) IV Push every 12 hours  piperacillin/tazobactam IVPB.. 3.375 Gram(s) IV Intermittent every 8 hours  sodium chloride 0.9%. 1000 milliLiter(s) (40 mL/Hr) IV Continuous <Continuous>  sucralfate 1 Gram(s) Oral two times a day    MEDICATIONS  (PRN):  lactulose Syrup 20 Gram(s) Oral daily PRN constipation  ondansetron   Disintegrating Tablet 8 milliGRAM(s) Oral every 8 hours PRN for nausea  oxyCODONE    IR 10 milliGRAM(s) Oral every 6 hours PRN Severe Pain (7 - 10)  prochlorperazine   Tablet 10 milliGRAM(s) Oral every 6 hours PRN for nausea    PHYSICAL EXAM:    Vital Signs Last 24 Hrs  T(C): 36.8 (25 Aug 2020 08:10), Max: 36.8 (24 Aug 2020 21:53)  T(F): 98.3 (25 Aug 2020 08:10), Max: 98.3 (24 Aug 2020 21:53)  HR: 102 (25 Aug 2020 08:10) (102 - 111)  BP: 120/78 (25 Aug 2020 08:10) (120/78 - 133/86)  BP(mean): --  RR: 18 (25 Aug 2020 08:10) (18 - 20)  SpO2: 97% (25 Aug 2020 08:10) (93% - 97%)    General: alert      Karnofsky:  %    HEENT: normal  dry mouth  ET tube/trach    Lungs: comfortable tachypnea/labored breathing  excessive secretions    CV: normal  tachycardia    GI: normal  distended  tender  no BS               PEG/NG/OG tube  constipation  last BM:     : normal  incontinent  oliguria/anuria  cheng    MSK: normal  weakness  edema             ambulatory  bedbound/wheelchair bound    Skin: normal  pressure ulcers- Stage_____  no rash    LABS:                      8.0    8.06  )-----------( 245      ( 25 Aug 2020 07:12 )             24.8     08-25    134<L>  |  96<L>  |  12.0  ----------------------------<  180<H>  4.0   |  26.0  |  1.34<H>    Ca    9.4      25 Aug 2020 07:12  Phos  3.5     08-25  Mg     1.3     08-25          I&O's Summary      RADIOLOGY & ADDITIONAL STUDIES:    ADVANCE DIRECTIVES:   DNR YES NO  Completed on:                     MOLST  YES NO   Completed on:  Living Will  YES NO   Completed on: OVERNIGHT EVENTS: doing well. going for MBS today     Present Symptoms:     Dyspnea: 0  Nausea/Vomiting: No  Anxiety:  No  Depression: No  Fatigue: No  Loss of appetite: No    Pain: some mild pain in back side, but relieved with pain meds.             Character-            Duration-            Effect-            Factors-            Frequency-            Location-            Severity-    Review of Systems: Reviewed                     Negative: no chest pain            All others negative    MEDICATIONS  (STANDING):  atorvastatin 10 milliGRAM(s) Oral at bedtime  enoxaparin Injectable 40 milliGRAM(s) SubCutaneous daily  magnesium sulfate  IVPB 2 Gram(s) IV Intermittent every 6 hours  metoprolol succinate ER 50 milliGRAM(s) Oral daily  pantoprazole  Injectable 40 milliGRAM(s) IV Push every 12 hours  piperacillin/tazobactam IVPB.. 3.375 Gram(s) IV Intermittent every 8 hours  sodium chloride 0.9%. 1000 milliLiter(s) (40 mL/Hr) IV Continuous <Continuous>  sucralfate 1 Gram(s) Oral two times a day    MEDICATIONS  (PRN):  lactulose Syrup 20 Gram(s) Oral daily PRN constipation  ondansetron   Disintegrating Tablet 8 milliGRAM(s) Oral every 8 hours PRN for nausea  oxyCODONE    IR 10 milliGRAM(s) Oral every 6 hours PRN Severe Pain (7 - 10)  prochlorperazine   Tablet 10 milliGRAM(s) Oral every 6 hours PRN for nausea    PHYSICAL EXAM:    Vital Signs Last 24 Hrs  T(C): 36.8 (25 Aug 2020 08:10), Max: 36.8 (24 Aug 2020 21:53)  T(F): 98.3 (25 Aug 2020 08:10), Max: 98.3 (24 Aug 2020 21:53)  HR: 102 (25 Aug 2020 08:10) (102 - 111)  BP: 120/78 (25 Aug 2020 08:10) (120/78 - 133/86)  BP(mean): --  RR: 18 (25 Aug 2020 08:10) (18 - 20)  SpO2: 97% (25 Aug 2020 08:10) (93% - 97%)    General: alert. knows hes in hospital     Karnofsky:  40 %    HEENT: normal     Lungs: comfortable     CV: normal      GI: normal      : normal      MSK: weakness      Skin: no rash    LABS:                      8.0    8.06  )-----------( 245      ( 25 Aug 2020 07:12 )             24.8     08-25    134<L>  |  96<L>  |  12.0  ----------------------------<  180<H>  4.0   |  26.0  |  1.34<H>    Ca    9.4      25 Aug 2020 07:12  Phos  3.5     08-25  Mg     1.3     08-25    I&O's Summary    RADIOLOGY & ADDITIONAL STUDIES:    ADVANCE DIRECTIVES: DNR/I - MOLST

## 2020-08-25 NOTE — PROGRESS NOTE ADULT - SUBJECTIVE AND OBJECTIVE BOX
CC: fever, cough/Esophageal Cancer    INTERVAL HPI/OVERNIGHT EVENTS: Patient seen and examined. Coughing while eating.  Denies chest pain, SOB, dizziness, nausea, vomiting, fever, chills.     Vital Signs Last 24 Hrs  T(C): 37 (24 Aug 2020 08:00), Max: 37 (24 Aug 2020 08:00)  T(F): 98.6 (24 Aug 2020 08:00), Max: 98.6 (24 Aug 2020 08:00)  HR: 100 (24 Aug 2020 08:00) (98 - 108)  BP: 110/68 (24 Aug 2020 08:00) (100/67 - 118/77)  BP(mean): --  RR: 18 (24 Aug 2020 08:00) (18 - 18)  SpO2: 95% (24 Aug 2020 08:00) (95% - 95%)      PHYSICAL EXAM:    GENERAL: NAD,   HEENT: PERRL, +EOMI  NECK: soft, Supple, No JVD,   CHEST/LUNG: decreased BS otherwise clear  HEART: S1S2+, Regular rate and rhythm; No murmurs, rubs, or gallops  ABDOMEN: Soft, Nontender, Nondistended; Bowel sounds present  EXTREMITIES:  2+ Peripheral Pulses, No clubbing, cyanosis, or edema  SKIN: No rashes or lesions  NEURO: AAOX3, no focal deficits,     I&O's Detail    23 Aug 2020 07:01  -  24 Aug 2020 07:00  --------------------------------------------------------  IN:    Oral Fluid: 250 mL  Total IN: 250 mL    OUT:    Voided: 1450 mL  Total OUT: 1450 mL    Total NET: -1200 mL                                    7.7    9.09  )-----------( 185      ( 23 Aug 2020 08:37 )             23.2     23 Aug 2020 08:34    134    |  99     |  23.0   ----------------------------<  190    4.1     |  23.0   |  1.47     Ca    9.2        23 Aug 2020 08:34  Mg     1.7       23 Aug 2020 08:34    TPro  6.5    /  Alb  2.9    /  TBili  0.2    /  DBili  x      /  AST  14     /  ALT  11     /  AlkPhos  77     23 Aug 2020 08:34      CAPILLARY BLOOD GLUCOSE        LIVER FUNCTIONS - ( 23 Aug 2020 08:34 )  Alb: 2.9 g/dL / Pro: 6.5 g/dL / ALK PHOS: 77 U/L / ALT: 11 U/L / AST: 14 U/L / GGT: x               MEDICATIONS  (STANDING):  atorvastatin 10 milliGRAM(s) Oral at bedtime  enoxaparin Injectable 40 milliGRAM(s) SubCutaneous daily  metoprolol succinate ER 50 milliGRAM(s) Oral daily  pantoprazole  Injectable 40 milliGRAM(s) IV Push every 12 hours  piperacillin/tazobactam IVPB.. 3.375 Gram(s) IV Intermittent every 8 hours  sucralfate 1 Gram(s) Oral two times a day  vancomycin  IVPB 1000 milliGRAM(s) IV Intermittent every 24 hours    MEDICATIONS  (PRN):  lactulose Syrup 20 Gram(s) Oral daily PRN constipation  ondansetron   Disintegrating Tablet 8 milliGRAM(s) Oral every 8 hours PRN for nausea  oxyCODONE    IR 10 milliGRAM(s) Oral every 6 hours PRN Severe Pain (7 - 10)  prochlorperazine   Tablet 10 milliGRAM(s) Oral every 6 hours PRN for nausea      RADIOLOGY & ADDITIONAL TESTS:

## 2020-08-26 NOTE — DIETITIAN INITIAL EVALUATION ADULT. - PROBLEM SELECTOR PLAN 1
-acute, sec to asp pna from poss occluded stent  -cont iv anitbx  -consider ID consultation  -monitor clinical course, monitor on tele with continuous pulse ox  -apprec GI collaboration-esophogram, npo, iv antibx, poss egd if occluded stent for removal and replacement

## 2020-08-26 NOTE — SWALLOW VFSS/MBS ASSESSMENT ADULT - DIAGNOSTIC IMPRESSIONS
Oral stage functional, however marked by reduced lingual coordination with resultant premature spillage of solids to the oropharynx and liquids to the hypopharynx. Mild pharyngeal dysphagia marked by reduced tongue base traction and reduced hyolaryngeal excursion. +moderate stasis in the pyriform sinuses, mild stasis in the vallecula with puree, soft solids, and nectar liquids, however reduced to trace-mild with subsequent swallows and small sips of thin liquids. Mild stasis in the pyriform sinuses/vallecula after thin liquids, also reduced to trace with subsequent swallow. No penetration/aspiration demonstrated during study.

## 2020-08-26 NOTE — SWALLOW VFSS/MBS ASSESSMENT ADULT - RECOMMENDED FEEDING/EATING TECHNIQUES
2-3 swallows/bite for solids, alternate foods/liquids to help clear pharyngeal stasis, maintain upright positioning during meals and 30 -45 minutes after PO intake/small sips/bites/position upright (90 degrees)/oral hygiene

## 2020-08-26 NOTE — SWALLOW VFSS/MBS ASSESSMENT ADULT - ASPIRATION PRECAUTIONS
Given history of recurrent PNA and observed pharyngeal stasis, adherence to strict aspiration precautions is recommended./yes

## 2020-08-26 NOTE — DIETITIAN INITIAL EVALUATION ADULT. - OTHER INFO
58 yo M with pmh DM2, HTN, HLD, Esophageal CA on chemo recently admitted for PICC line placement for chemo who states he developed fever sec to aspiration pna and suspected occluded esophageal stent, acute respiratory failure with hypoxia and acute on chronic kidney disease stage 3. Current diet is puree no liquids with consistent CHO tolerating well as per pt. Pt reports 15# weight loss since April (6 mo). No nausea, vomiting and having normal bowel movements as per pt.

## 2020-08-26 NOTE — PROGRESS NOTE ADULT - SUBJECTIVE AND OBJECTIVE BOX
OVERNIGHT EVENTS: no acute events. doing well. awaiting MBS     Present Symptoms:     Dyspnea: 0  Nausea/Vomiting: No  Anxiety:  No  Depression: No  Fatigue: no  Loss of appetite: no     Pain: none             Character-            Duration-            Effect-            Factors-            Frequency-            Location-            Severity-    Review of Systems: Reviewed                     Negative: no chest pain             All others negative    MEDICATIONS  (STANDING):  atorvastatin 10 milliGRAM(s) Oral at bedtime  chlorhexidine 2% Cloths 1 Application(s) Topical daily  enoxaparin Injectable 40 milliGRAM(s) SubCutaneous daily  metoprolol succinate ER 50 milliGRAM(s) Oral daily  pantoprazole  Injectable 40 milliGRAM(s) IV Push every 12 hours  piperacillin/tazobactam IVPB.. 3.375 Gram(s) IV Intermittent every 8 hours  sucralfate 1 Gram(s) Oral two times a day    MEDICATIONS  (PRN):  lactulose Syrup 20 Gram(s) Oral daily PRN constipation  ondansetron   Disintegrating Tablet 8 milliGRAM(s) Oral every 8 hours PRN for nausea  oxyCODONE    IR 10 milliGRAM(s) Oral every 6 hours PRN Severe Pain (7 - 10)  prochlorperazine   Tablet 10 milliGRAM(s) Oral every 6 hours PRN for nausea    PHYSICAL EXAM:    Vital Signs Last 24 Hrs  T(C): 36.7 (26 Aug 2020 08:27), Max: 37.3 (25 Aug 2020 15:59)  T(F): 98.1 (26 Aug 2020 08:27), Max: 99.2 (25 Aug 2020 15:59)  HR: 95 (26 Aug 2020 08:27) (94 - 116)  BP: 128/86 (26 Aug 2020 08:27) (102/65 - 130/80)  BP(mean): --  RR: 18 (26 Aug 2020 08:27) (18 - 18)  SpO2: 99% (26 Aug 2020 08:27) (90% - 99%)    General: alert     Karnofsky:  40 %    HEENT: normal     Lungs: comfortable     CV: normal      GI: normal      : normal      MSK: weakness      Skin: no rash    LABS:                      8.0    6.52  )-----------( 256      ( 26 Aug 2020 07:01 )             24.6     08-26    138  |  98  |  13.0  ----------------------------<  160<H>  3.7   |  26.0  |  1.41<H>    Ca    9.3      26 Aug 2020 07:01  Phos  3.5     08-26  Mg     2.1     08-26    I&O's Summary    RADIOLOGY & ADDITIONAL STUDIES:    ADVANCE DIRECTIVES: DNR/I

## 2020-08-26 NOTE — SWALLOW VFSS/MBS ASSESSMENT ADULT - NS SWALLOW VFSS REC ASPIR MON
change of breathing pattern/oral hygiene/upper respiratory infection/position upright (90Y)/cough/gurgly voice/throat clearing/fever/pneumonia/Pt educated at length regarding use of aspiration precautions during all PO intake

## 2020-08-26 NOTE — PROGRESS NOTE ADULT - SUBJECTIVE AND OBJECTIVE BOX
CC: fever, cough/Esophageal Cancer    INTERVAL HPI/OVERNIGHT EVENTS: Patient seen and examined. s/p MBS.  Denies chest pain, SOB, dizziness, nausea, vomiting, fever, chills.     Vital Signs Last 24 Hrs  T(C): 37 (24 Aug 2020 08:00), Max: 37 (24 Aug 2020 08:00)  T(F): 98.6 (24 Aug 2020 08:00), Max: 98.6 (24 Aug 2020 08:00)  HR: 100 (24 Aug 2020 08:00) (98 - 108)  BP: 110/68 (24 Aug 2020 08:00) (100/67 - 118/77)  BP(mean): --  RR: 18 (24 Aug 2020 08:00) (18 - 18)  SpO2: 95% (24 Aug 2020 08:00) (95% - 95%)      PHYSICAL EXAM:    GENERAL: NAD,   HEENT: PERRL, +EOMI  NECK: soft, Supple, No JVD,   CHEST/LUNG: decreased BS otherwise clear  HEART: S1S2+, Regular rate and rhythm; No murmurs, rubs, or gallops  ABDOMEN: Soft, Nontender, Nondistended; Bowel sounds present  EXTREMITIES:  2+ Peripheral Pulses, No clubbing, cyanosis, or edema  SKIN: No rashes or lesions  NEURO: AAOX3, no focal deficits,     I&O's Detail    23 Aug 2020 07:01  -  24 Aug 2020 07:00  --------------------------------------------------------  IN:    Oral Fluid: 250 mL  Total IN: 250 mL    OUT:    Voided: 1450 mL  Total OUT: 1450 mL    Total NET: -1200 mL                                    7.7    9.09  )-----------( 185      ( 23 Aug 2020 08:37 )             23.2     23 Aug 2020 08:34    134    |  99     |  23.0   ----------------------------<  190    4.1     |  23.0   |  1.47     Ca    9.2        23 Aug 2020 08:34  Mg     1.7       23 Aug 2020 08:34    TPro  6.5    /  Alb  2.9    /  TBili  0.2    /  DBili  x      /  AST  14     /  ALT  11     /  AlkPhos  77     23 Aug 2020 08:34      CAPILLARY BLOOD GLUCOSE        LIVER FUNCTIONS - ( 23 Aug 2020 08:34 )  Alb: 2.9 g/dL / Pro: 6.5 g/dL / ALK PHOS: 77 U/L / ALT: 11 U/L / AST: 14 U/L / GGT: x               MEDICATIONS  (STANDING):  atorvastatin 10 milliGRAM(s) Oral at bedtime  enoxaparin Injectable 40 milliGRAM(s) SubCutaneous daily  metoprolol succinate ER 50 milliGRAM(s) Oral daily  pantoprazole  Injectable 40 milliGRAM(s) IV Push every 12 hours  piperacillin/tazobactam IVPB.. 3.375 Gram(s) IV Intermittent every 8 hours  sucralfate 1 Gram(s) Oral two times a day  vancomycin  IVPB 1000 milliGRAM(s) IV Intermittent every 24 hours    MEDICATIONS  (PRN):  lactulose Syrup 20 Gram(s) Oral daily PRN constipation  ondansetron   Disintegrating Tablet 8 milliGRAM(s) Oral every 8 hours PRN for nausea  oxyCODONE    IR 10 milliGRAM(s) Oral every 6 hours PRN Severe Pain (7 - 10)  prochlorperazine   Tablet 10 milliGRAM(s) Oral every 6 hours PRN for nausea      RADIOLOGY & ADDITIONAL TESTS:

## 2020-08-26 NOTE — SWALLOW VFSS/MBS ASSESSMENT ADULT - SLP PERTINENT HISTORY OF CURRENT PROBLEM
58 yo M with pmh DM2, HTN, HLD, Esophageal CA on chemo recently admitted for PICC line placement for chemo who states he developed fever yesterday and this morning represented with fever of 102.3 as well as cough with sputum production admitted with sepsis sec to aspiration pna and suspected occluded esophageal stent. Esophagram showed patent stent. Small amount of residual barium in the cervical esophagus; patient began coughing during the study, possibly secondary to aspiration of barium; a modified barium swallow is recommended for further evaluation. Pt endorses a soft, cut up diet with thin liquids PTA and denies overt swallowing difficulty.

## 2020-08-26 NOTE — DIETITIAN INITIAL EVALUATION ADULT. - SIGNS/SYMPTOMS
as evidenced by 15# weight loss in 6 mo, mild NFPE as evidenced by 15# weight loss in 6 mo, mild NFPE, <75% intake > 1 mo, dysphagia

## 2020-08-26 NOTE — PROGRESS NOTE ADULT - ASSESSMENT
60 yo M with pmh DM2, HTN, HLD, Esophageal CA on chemo recently admitted for PICC line placement for chemo who states he developed fever yesterday and this morning represented with fever of 102.3 as well as cough with sputum production admitted with sepsis sec to aspiration pna and suspected occluded esophageal stent, acute respiratory failure with hypoxia and acute on chronic kidney disease stage 3. Patient is s.p 1 unit prbc     Problem/Plan - 1:  ·  Problem: Sepsis with acute hypoxic respiratory failure and septic shock, due to unspecified organism.  Plan: -acute, sec to asp pna   -cont iv zosyn day 6  --> blood cx neg x 3  --> Esophagram showed patent stent. Small amount of residual barium in the cervical esophagus; patient began coughing during the study, possibly secondary to aspiration of barium; a modified barium swallow is recommended for further evaluation.  Radiology recommended MBS as pt had coughing episodes while swallowing contrast. S & S re-eval called. MBS done with mech soft with thins recommended.     Problem/Plan - 2:  ·  Problem: Pneumonia.  Plan: -aspiration   -plan as above.        Problem/Plan - 3:  ·  Problem: anemia - improved  --> s.p 1 unit prbc     Problem/Plan - 4:  ·  Problem: Hyponatremia. improved     Problem/Plan - 5:  ·  Problem: Esophageal cancer stage 4  currently undergoing chemo via PICC. No plans for further chemo as per pt & sister  - outpatient follow up      Problem/Plan - 6:  Problem: HTN (hypertension). Plan: chronic, stable  cont home meds with parameters     Problem/Plan - 7:  ·  Problem: Type 2 diabetes mellitus without complication, without long-term current use of insulin.  -   -a1c is 6  --> dc ssi     Dispo: Palliative consult, sister asking for Hospice eval, according to sister there are no further plans for chemo. Sister agreeable to [facility]. Awaiting acceptance at [facility]

## 2020-08-26 NOTE — PROGRESS NOTE ADULT - ASSESSMENT
59M with poorly differentiated esophageal cancer, s/p stent 5/20, s/p chemo, admitted with pneumonia, possible occluded stent, also with acute on CKD, s/p respiratory failure now on room air breathing comfortably.     #1 Esophageal cancer - at this time, patient has been too debilitated and struggling with bouts of recurrent infections and dehydration, family has opted to stop chemotherapy and anti neoplastic treatments at this time.   #2 Pnuemonia - to complete 7 day course of anitbiotics per primary team. stent patent and not occluded.   #3 Dehydration - spoke with sister at length by phone, expressed that at a certain point, we will have to make a decision on burdens of coming back and forth to the hospital for IV hydration versus just focusing on symptom management and comfort.   #4 Palliative care encounter - patient cleared for mechanical soft diet with thin liquids. MBS still pending. Likely dispo will be assisted living with hospice.

## 2020-08-26 NOTE — CHART NOTE - NSCHARTNOTEFT_GEN_A_CORE
Upon Nutritional Assessment by the Registered Dietitian your patient was determined to meet criteria / has evidence of the following diagnosis/diagnoses:          [ ]  Mild Protein Calorie Malnutrition        [x ]  Moderate Protein Calorie Malnutrition Chronic        [ ] Severe Protein Calorie Malnutrition        [ ] Unspecified Protein Calorie Malnutrition        [ ] Underweight / BMI <19        [ ] Morbid Obesity / BMI > 40      Findings as based on:  •  Comprehensive nutrition assessment and consultation  •  Calorie counts (nutrient intake analysis)  •  Food acceptance and intake status from observations by staff  •  Follow up  •  Patient education  •  Intervention secondary to interdisciplinary rounds  •   concerns      Treatment:    The following diet has been recommended: Rec Gelatein BID, Rec Ensure Pudding TID      PROVIDER Section:     By signing this assessment you are acknowledging and agree with the diagnosis/diagnoses assigned by the Registered Dietitian    Comments:

## 2020-08-26 NOTE — SWALLOW VFSS/MBS ASSESSMENT ADULT - RESIDUE IN PYRIFORM SINUSES
Reduced to mild with subsequent swallow, and trace following third swallow/Moderate reduced to trace-mild with subsequent swallow/Moderate reduced to trace with subsequent swallow/Mild

## 2020-08-26 NOTE — SWALLOW VFSS/MBS ASSESSMENT ADULT - RESIDUE IN VALLECULAE
Moderate/reduced to trace with subsequent swallow reduced to trace with subsequent swallow/Mild Mild Trace

## 2020-08-26 NOTE — SWALLOW VFSS/MBS ASSESSMENT ADULT - PHARYNGEAL PHASE COMMENTS
Pt denied globus sensation despite presence of stasis, benefitted from verbal cue for subsequent swallow.    Stasis also notably reduced with small sip of thin liquid. +intermittent throat clear post swallow observed, however no PO in airway Small sip of thin liquid beneficial in reducing vallecular and pyriform sinus stasis with solids.

## 2020-08-26 NOTE — DIETITIAN INITIAL EVALUATION ADULT. - PERTINENT LABORATORY DATA
08-26 Na138 mmol/L Glu 160 mg/dL<H> K+ 3.7 mmol/L Cr  1.41 mg/dL<H> BUN 13.0 mg/dL Phos 3.5 mg/dL Alb n/a   PAB n/a

## 2020-08-27 NOTE — PROGRESS NOTE ADULT - ASSESSMENT
59M with poorly differentiated esophageal cancer, s/p stent 5/20, s/p chemo, admitted with pneumonia, possible occluded stent, also with acute on CKD, s/p respiratory failure now on room air breathing comfortably.     #1 Esophageal cancer - at this time, patient has been too debilitated and struggling with bouts of recurrent infections and dehydration, family has opted to stop chemotherapy and anti neoplastic treatments at this time.   #2 Pnuemonia - to complete 7 day course of anitbiotics per primary team. stent patent and not occluded.   #3 Dehydration - spoke with sister at length by phone, expressed that at a certain point, we will have to make a decision on burdens of coming back and forth to the hospital for IV hydration versus just focusing on symptom management and comfort.   #4 Palliative care encounter - see Rhina ORONA note - goals of care. Sister Rashmi wants her brother to go to nursing facility on comfort measures, but she is not 100% ready to check off " Do not hospitalize" on MOLST right now but she knows she can change that once he gets to facility. She is unsure if she woudl say no to IV fluid hydration if it will help his quality of life. She is well aware that he is likely going to aspirate and develop recurrent infections related and at that point, she just wants him to be made comfortable and not have the burden of coming back and forth to the hospital.

## 2020-08-27 NOTE — DISCHARGE NOTE PROVIDER - PROVIDER TOKENS
PROVIDER:[TOKEN:[[id number]]],PROVIDER:[TOKEN:[[id number]]],PROVIDER:[TOKEN:[[id number]]]

## 2020-08-27 NOTE — DISCHARGE NOTE PROVIDER - CARE PROVIDERS DIRECT ADDRESSES
[email or web address]

## 2020-08-27 NOTE — PROGRESS NOTE ADULT - ASSESSMENT
60 yo M with pmh DM2, HTN, HLD, Esophageal CA on chemo recently admitted for PICC line placement for chemo who states he developed fever yesterday and this morning represented with fever of 102.3 as well as cough with sputum production admitted with sepsis sec to aspiration pna and suspected occluded esophageal stent, acute respiratory failure with hypoxia and acute on chronic kidney disease stage 3. Patient is s.p 1 unit prbc     Problem/Plan - 1:  ·  Problem: Sepsis with acute hypoxic respiratory failure and septic shock, due to unspecified organism.  Plan: -acute, sec to asp pna   -cont iv zosyn day 7  --> blood cx neg x 3  --> Esophagram showed patent stent. Small amount of residual barium in the cervical esophagus; patient began coughing during the study, possibly secondary to aspiration of barium; a modified barium swallow is recommended for further evaluation.  Radiology recommended MBS as pt had coughing episodes while swallowing contrast. S & S re-eval called. MBS done with mech soft with thins recommended.  98% on RA     Problem/Plan - 2:  ·  Problem: Pneumonia.  Plan: -aspiration   -plan as above.        Problem/Plan - 3:  ·  Problem: anemia - improved  --> s.p 1 unit prbcs     Problem/Plan - 4:  ·  Problem: Hyponatremia. improved     Problem/Plan - 5:  ·  Problem: Esophageal cancer stage 4  currently undergoing chemo via PICC. No plans for further chemo as per pt & sister. May remove PICC prior to d/c when d/c confirmed  - outpatient follow up      Problem/Plan - 6:  Problem: HTN (hypertension). Plan: chronic, stable  cont home meds with parameters     Problem/Plan - 7:  ·  Problem: Type 2 diabetes mellitus without complication, without long-term current use of insulin.  -   -a1c is 6  --> dc ssi     Hypomagnesemia-  Mag replaced  f/u Mg      ISIRDO on CKD 3  Avoid nephrotoxic agents  Renally adjust meds  f/u BMP    Dispo: Palliative consult, sister asking for Hospice eval, according to sister there are no further plans for chemo. Hospice unable to provide services in [facility]. Sister in agreement with [facility]. Awaiting acceptance at [facility].

## 2020-08-27 NOTE — DISCHARGE NOTE PROVIDER - NSDCMRMEDTOKEN_GEN_ALL_CORE_FT
atorvastatin 10 mg oral tablet: 1 tab(s) orally once a day (at bedtime)  lactulose 10 g/15 mL oral syrup: 30 milliliter(s) orally once a day, As Needed constipation  lisinopril 2.5 mg oral tablet: 1 tab(s) orally once a day in morning  magnesium oxide 400 mg (241.3 mg elemental magnesium) oral tablet: 1 tab(s) orally 3 times a day  metFORMIN 1000 mg oral tablet: 1 tab(s) orally 2 times a day  Metoprolol Succinate ER 50 mg oral tablet, extended release: 1 tab(s) orally once a day  ondansetron 8 mg oral tablet, disintegratin tab(s) orally every 8 hours, As Needed  ondansetron 8 mg oral tablet, disintegratin tab(s) orally every 8 hours, As Needed - for nausea  oxyCODONE 10 mg oral tablet: 1 tab(s) orally every 6 hours, As needed, Severe Pain (7 - 10)  oxyCODONE 10 mg oral tablet: 1 tab(s) orally prn, As Needed  pantoprazole 20 mg oral delayed release tablet: 1 tab(s) orally 2 times a day  prochlorperazine 10 mg oral tablet: 1 tab(s) orally every 6 hours, As Needed - for nausea  prochlorperazine 10 mg oral tablet: 1 tab(s) orally every 6 hours, As needed, for nausea  prochlorperazine 10 mg oral tablet: 1 tab(s) orally every 6 hours, As Needed  sucralfate 1 g oral tablet: 1 tab(s) orally 2 times a day, As Needed for GI ulcers atorvastatin 10 mg oral tablet: 1 tab(s) orally once a day (at bedtime)  Augmentin 875 mg-125 mg oral tablet: 1 tab(s) orally every 12 hours until 20  Januvia 50 mg oral tablet: 1 tab(s) orally once a day  lactulose 10 g/15 mL oral syrup: 30 milliliter(s) orally once a day, As Needed constipation  lisinopril 2.5 mg oral tablet: 1 tab(s) orally once a day in morning  magnesium oxide 400 mg (241.3 mg elemental magnesium) oral tablet: 1 tab(s) orally 3 times a day  Metoprolol Succinate ER 50 mg oral tablet, extended release: 1 tab(s) orally once a day  ondansetron 8 mg oral tablet, disintegratin tab(s) orally every 8 hours, As Needed - for nausea  oxyCODONE 10 mg oral tablet: 1 tab(s) orally every 6 hours, As needed, Severe Pain (7 - 10)  pantoprazole 20 mg oral delayed release tablet: 1 tab(s) orally 2 times a day  prochlorperazine 10 mg oral tablet: 1 tab(s) orally every 6 hours, As needed, for nausea  sucralfate 1 g oral tablet: 1 tab(s) orally 2 times a day, As Needed for GI ulcers atorvastatin 10 mg oral tablet: 1 tab(s) orally once a day (at bedtime)  Januvia 50 mg oral tablet: 1 tab(s) orally once a day  lactulose 10 g/15 mL oral syrup: 30 milliliter(s) orally once a day, As Needed constipation  lisinopril 2.5 mg oral tablet: 1 tab(s) orally once a day in morning  magnesium oxide 400 mg (241.3 mg elemental magnesium) oral tablet: 1 tab(s) orally 3 times a day  Metoprolol Succinate ER 50 mg oral tablet, extended release: 1 tab(s) orally once a day  ondansetron 8 mg oral tablet, disintegratin tab(s) orally every 8 hours, As Needed - for nausea  oxyCODONE 10 mg oral tablet: 1 tab(s) orally every 6 hours, As needed, Severe Pain (7 - 10)  pantoprazole 20 mg oral delayed release tablet: 1 tab(s) orally 2 times a day  prochlorperazine 10 mg oral tablet: 1 tab(s) orally every 6 hours, As needed, for nausea  sucralfate 1 g oral tablet: 1 tab(s) orally 2 times a day, As Needed for GI ulcers

## 2020-08-27 NOTE — DISCHARGE NOTE PROVIDER - NSDCCPCAREPLAN_GEN_ALL_CORE_FT
PRINCIPAL DISCHARGE DIAGNOSIS  Diagnosis: Sepsis  Assessment and Plan of Treatment: Due to aspiration PNA-resolved  Complete antibiotics as prescribed        SECONDARY DISCHARGE DIAGNOSES  Diagnosis: Esophageal cancer  Assessment and Plan of Treatment: stent placement May 2020-patent  Mechanical soft diet with thin lquids  No further chemotherapy as per patient and family    Diagnosis: Pneumonia  Assessment and Plan of Treatment: complete antibiotcs as prescribed PRINCIPAL DISCHARGE DIAGNOSIS  Diagnosis: Sepsis  Assessment and Plan of Treatment: Due to aspiration PNA-resolved  Complete antibiotics as prescribed        SECONDARY DISCHARGE DIAGNOSES  Diagnosis: Esophageal cancer  Assessment and Plan of Treatment: stent placement May 2020-patent  Mechanical soft diet with thin lquids  No further chemotherapy as per patient and family    Diagnosis: Pneumonia  Assessment and Plan of Treatment: complete antibiotcs as prescribed. Follow up with your primary doctor within 1 week of discharge

## 2020-08-27 NOTE — DISCHARGE NOTE PROVIDER - HOSPITAL COURSE
58 yo M with pmh DM2, HTN, HLD, Esophageal CA on chemo recently admitted for PICC line placement for chemo who states he developed fever yesterday and this morning represented with fever of 102.3 as well as cough with sputum production admitted with sepsis sec to aspiration pna and suspected occluded esophageal stent, acute respiratory failure with hypoxia and acute on chronic kidney disease stage 3. Patient is s.p 1 unit prbc. Completed course of IV Zosyn. Blood culture negative x 3. Esophagram showed patent stent. Small amount of residual barium in the cervical esophagus. As patient began coughing during the study, possibly secondary to aspiration of barium a modified barium swallow was recommended for further evaluation. MBS performed and recommend Mechanical soft with thin liquids. the patient was seen in consultation by Palliative Care. As per patient and patient's sister there is no further plans for chemotherapy. The patient was seen by PT and recommend GHISLAINE. The patient is medically stable for discharge.         Vital Signs Last 24 Hrs    T(C): 36.5 (27 Aug 2020 07:48), Max: 36.8 (26 Aug 2020 20:42)    T(F): 97.7 (27 Aug 2020 07:48), Max: 98.2 (26 Aug 2020 20:42)    HR: 94 (27 Aug 2020 07:48) (94 - 115)    BP: 111/72 (27 Aug 2020 07:48) (111/72 - 142/83)    BP(mean): --    RR: 21 (27 Aug 2020 05:27) (18 - 21)    SpO2: 98% (27 Aug 2020 05:27) (95% - 100%)                                    8.2      6.58  )-----------( 255      ( 27 Aug 2020 07:06 )               25.4         27 Aug 2020 07:06        137    |  99     |  15.0     ----------------------------<  152      4.1     |  25.0   |  1.46         Ca    9.6        27 Aug 2020 07:06    Phos  3.6       27 Aug 2020 07:06    Mg     1.5       27 Aug 2020 07:06                CAPILLARY BLOOD GLUCOSE            PHYSICAL EXAM:        GENERAL: NAD,     HEENT: PERRL, +EOMI    NECK: soft, Supple, No JVD,     CHEST/LUNG: decreased BS otherwise clear    HEART: S1S2+, Regular rate and rhythm; No murmurs, rubs, or gallops    ABDOMEN: Soft, Nontender, Nondistended; Bowel sounds present    EXTREMITIES:  2+ Peripheral Pulses, No clubbing, cyanosis, or edema    SKIN: No rashes or lesions    NEURO: AAOX3, no focal deficits, 58 yo M with pmh DM2, HTN, HLD, Esophageal CA on chemo recently admitted for PICC line placement for chemo who states he developed fever yesterday and this morning represented with fever of 102.3 as well as cough with sputum production admitted with sepsis sec to aspiration pna and suspected occluded esophageal stent, acute respiratory failure with hypoxia and acute on chronic kidney disease stage 3. Patient is s.p 1 unit prbc. Completed course of IV Zosyn. Blood culture negative x 3. Esophagram showed patent stent. Small amount of residual barium in the cervical esophagus. As patient began coughing during the study, possibly secondary to aspiration of barium a modified barium swallow was recommended for further evaluation. MBS performed and recommend Mechanical soft with thin liquids. the patient was seen in consultation by Palliative Care. As per patient and patient's sister there is no further plans for chemotherapy. PICC removed. The patient was seen by PT and recommend GHISLAINE. The patient is medically stable for discharge. 60 yo M with pmh DM2, HTN, HLD, Esophageal CA on chemo recently admitted for PICC line placement for chemo who states he developed fever yesterday and this morning represented with fever of 102.3 as well as cough with sputum production admitted with sepsis sec to aspiration pna and suspected occluded esophageal stent, acute respiratory failure with hypoxia and acute on chronic kidney disease stage 3. Patient was noted to be anemia likely from checmo, he is s.p 1 unit prbc, H&H has been stable, Completed course of IV Zosyn for aspiration pneumonia likely from gram positive and gram negative bacterial infection, his blood culture negative x 3. Esophagram showed patent stent. Small amount of residual barium in the cervical esophagus. As patient began coughing during the study, possibly secondary to aspiration of barium a modified barium swallow was recommended for further evaluation. MBS performed and recommend Mechanical soft with thin liquids. the patient was seen in consultation by Palliative Care. As per patient and patient's sister there is no further plans for chemotherapy. PICC removed. The patient was seen by PT and recommend GHISLAINE. The patient is medically stable for discharge.      he is on mech soft with thins recommended.    his acute hypoxic respiratory failure resolved, he is saturating 98% on RA.      He is being discharged in a stable condition.         Total time spent 38 minutes

## 2020-08-27 NOTE — PROGRESS NOTE ADULT - SUBJECTIVE AND OBJECTIVE BOX
OVERNIGHT EVENTS: no acute changes.     Present Symptoms:     Dyspnea: 0  Nausea/Vomiting: No  Anxiety:  No  Depression: No  Fatigue: no  Loss of appetite: no     Pain: none             Character-            Duration-            Effect-            Factors-            Frequency-            Location-            Severity-    Review of Systems: Reviewed                     Negative: no chest pain             All others negative    MEDICATIONS  (STANDING):  atorvastatin 10 milliGRAM(s) Oral at bedtime  chlorhexidine 2% Cloths 1 Application(s) Topical daily  enoxaparin Injectable 40 milliGRAM(s) SubCutaneous daily  magnesium sulfate  IVPB 2 Gram(s) IV Intermittent every 6 hours  metoprolol succinate ER 50 milliGRAM(s) Oral daily  pantoprazole  Injectable 40 milliGRAM(s) IV Push every 12 hours  piperacillin/tazobactam IVPB.. 3.375 Gram(s) IV Intermittent every 8 hours  sucralfate 1 Gram(s) Oral two times a day    MEDICATIONS  (PRN):  lactulose Syrup 20 Gram(s) Oral daily PRN constipation  ondansetron   Disintegrating Tablet 8 milliGRAM(s) Oral every 8 hours PRN for nausea  oxyCODONE    IR 10 milliGRAM(s) Oral every 6 hours PRN Severe Pain (7 - 10)  prochlorperazine   Tablet 10 milliGRAM(s) Oral every 6 hours PRN for nausea    PHYSICAL EXAM:    Vital Signs Last 24 Hrs  T(C): 36.5 (27 Aug 2020 07:48), Max: 36.8 (26 Aug 2020 20:42)  T(F): 97.7 (27 Aug 2020 07:48), Max: 98.2 (26 Aug 2020 20:42)  HR: 94 (27 Aug 2020 07:48) (94 - 115)  BP: 111/72 (27 Aug 2020 07:48) (111/72 - 142/83)  BP(mean): --  RR: 21 (27 Aug 2020 05:27) (18 - 21)  SpO2: 98% (27 Aug 2020 05:27) (95% - 100%)    General: alert     Karnofsky:  40 %    HEENT: normal     Lungs: comfortable     CV: normal      GI: normal      : normal      MSK: weakness      Skin: no rash    LABS:                      8.2    6.58  )-----------( 255      ( 27 Aug 2020 07:06 )             25.4     08-27    137  |  99  |  15.0  ----------------------------<  152<H>  4.1   |  25.0  |  1.46<H>    Ca    9.6      27 Aug 2020 07:06  Phos  3.6     08-27  Mg     1.5     08-27    I&O's Summary    RADIOLOGY & ADDITIONAL STUDIES:    ADVANCE DIRECTIVES: DNR/I, now updated MOLST for comfort care.

## 2020-08-27 NOTE — GOALS OF CARE CONVERSATION - ADVANCED CARE PLANNING - CONVERSATION DETAILS
Sw and palliative care physician DR Silva conference called patients sister to review planning and end of life issues with dx. Education provided and questions clarified regarding hospice benefit and services as well as LT care with Medicaid conversion. sister reports having elder care  assisting but is presently pursuing [facility] in [place] for transition from hospital. Review of Hospice at Assisted Living provided as well as further decision making regarding antibiotics and recurrent hospitalization needs to be considered further. Support offered in coping with catastrophic dx and caregiver stressors with caring and planning for patient needs as well as elderly mother with whom patient resided prior. Referral initiated to [facility] for further review of Hospice benefits at Assisted Living.
Hospice referral received for possible hospice support should patient be discharged to Assisted Living Facility. Writer/Hospice Care RN telephoned patient's sister, Rashmi ([phone]) to discuss home hospice services - Rashmi not available. Left voicemail message and awaiting call back. Will continue to follow.    Isidra Harry RN  [phone]
UPDATE:    Lengthy conversation held with patient's sister, Rashmi. [facility] RN explained all aspects of hospice care that could be provided at patient's assisted living facility, should patient be discharged to an intermediate.   All questions answered with good return understanding, emotional support provided. Will continue to follow for results of MBS study on 8/26 and will assist with d/c planning as appropriate.    Isidra Harry RN  [phone]
We discussed overall condition and prognosis. At this time, sister has made me aware that they as a family have opted to forego further chemotherapy and anti neopastic treatments and pursue a focus on quality of life and comfort only. They do want to continue conservative treatments for reversible things like infections and they also want to continue IV hydration. If he were to require rehospitalization for hydration via IV route, family would want that at this time. I discussed advanced directives with her as well, and she stated they all talked as a family and would not want heroic measures at end of life - DNR and DNI. We also discussed hospice services and sister Rashmi is interested in hearing more about it if it is an option for him to try to avoid the burden of coming back and forth to the hospital and to optimize his comfort and quality of life. She is in touch with an elder  and currently trying to get him into [facility] ( he has private funds to pay for it). MOLST filled out and a lot of education provided to . I discussed that at this time his prognosis is likely months, maybe even a year or so depending on if he develops recurrent infections or has recurrent hospitalizations it could be shorter. I did discuss that at a certain point, it may be a difficult decision, but if the burden of coming back and forth to the hospital for IV hydration becomes more than the benefit or if they feel it is no longer quality of life for Clement, then they may have to make the decision to prevent recurrent hospitalizations and just focus on end of life care.
SW contacted patients sister Rashmi to review planning for patient and coping with end of life issues. Sister reports she was advised that patient will require higher level of care needed at SNF instead of pursuing assisted Living. sister reports coordinating with unit SW regarding options for placement and want to ensure patient is being placed with comfort measures. Sister realistic in understanding that patients disease has progressed and despite diet modifications patient will continue to have risk for ongoing aspirations. SW reviewed updates which can be done on MOLST to specify comfort measures. sister is undecided upon rehospitalization and will continue to reassess decision as patient conditions deteriorates. MOLST update with palliative physician DR Silva.

## 2020-08-27 NOTE — DISCHARGE NOTE PROVIDER - CARE PROVIDER_API CALL
Ana Santiago  MEDICAL ONCOLOGY  [facility], [address] [place] [address]  Phone: [phone]  Fax: [phone]  Follow Up Time:     Marky Diane  [facility]  [address] [place] [address]  Phone: [phone]  Fax: [phone]  Follow Up Time:     Tristian Mckee  GASTROENTEROLOGY  [facility], [address] [place] [address]  Phone: [phone]  Fax: [phone]  Follow Up Time:

## 2020-08-27 NOTE — DISCHARGE NOTE PROVIDER - NSDCFUADDINST_GEN_ALL_CORE_FT
Please get your Basic metabolic panel checked in 1 week  please drink plenty of fluids.   Get your Complete blood count checked in 1week as well.

## 2020-08-27 NOTE — PROGRESS NOTE ADULT - SUBJECTIVE AND OBJECTIVE BOX
CC: fever, cough/Esophageal Cancer    INTERVAL HPI/OVERNIGHT EVENTS: Patient seen and examined.  Denies chest pain, SOB, dizziness, nausea, vomiting, fever, chills.     Vital Signs Last 24 Hrs  T(C): 37 (24 Aug 2020 08:00), Max: 37 (24 Aug 2020 08:00)  T(F): 98.6 (24 Aug 2020 08:00), Max: 98.6 (24 Aug 2020 08:00)  HR: 100 (24 Aug 2020 08:00) (98 - 108)  BP: 110/68 (24 Aug 2020 08:00) (100/67 - 118/77)  BP(mean): --  RR: 18 (24 Aug 2020 08:00) (18 - 18)  SpO2: 95% (24 Aug 2020 08:00) (95% - 95%)      PHYSICAL EXAM:    GENERAL: NAD,   HEENT: PERRL, +EOMI  NECK: soft, Supple, No JVD,   CHEST/LUNG: decreased BS otherwise clear  HEART: S1S2+, Regular rate and rhythm; No murmurs, rubs, or gallops  ABDOMEN: Soft, Nontender, Nondistended; Bowel sounds present  EXTREMITIES:  2+ Peripheral Pulses, No clubbing, cyanosis, or edema  SKIN: No rashes or lesions  NEURO: AAOX3, no focal deficits,     I&O's Detail    23 Aug 2020 07:01  -  24 Aug 2020 07:00  --------------------------------------------------------  IN:    Oral Fluid: 250 mL  Total IN: 250 mL    OUT:    Voided: 1450 mL  Total OUT: 1450 mL    Total NET: -1200 mL                                    7.7    9.09  )-----------( 185      ( 23 Aug 2020 08:37 )             23.2     23 Aug 2020 08:34    134    |  99     |  23.0   ----------------------------<  190    4.1     |  23.0   |  1.47     Ca    9.2        23 Aug 2020 08:34  Mg     1.7       23 Aug 2020 08:34    TPro  6.5    /  Alb  2.9    /  TBili  0.2    /  DBili  x      /  AST  14     /  ALT  11     /  AlkPhos  77     23 Aug 2020 08:34      CAPILLARY BLOOD GLUCOSE        LIVER FUNCTIONS - ( 23 Aug 2020 08:34 )  Alb: 2.9 g/dL / Pro: 6.5 g/dL / ALK PHOS: 77 U/L / ALT: 11 U/L / AST: 14 U/L / GGT: x               MEDICATIONS  (STANDING):  atorvastatin 10 milliGRAM(s) Oral at bedtime  enoxaparin Injectable 40 milliGRAM(s) SubCutaneous daily  metoprolol succinate ER 50 milliGRAM(s) Oral daily  pantoprazole  Injectable 40 milliGRAM(s) IV Push every 12 hours  piperacillin/tazobactam IVPB.. 3.375 Gram(s) IV Intermittent every 8 hours  sucralfate 1 Gram(s) Oral two times a day  vancomycin  IVPB 1000 milliGRAM(s) IV Intermittent every 24 hours    MEDICATIONS  (PRN):  lactulose Syrup 20 Gram(s) Oral daily PRN constipation  ondansetron   Disintegrating Tablet 8 milliGRAM(s) Oral every 8 hours PRN for nausea  oxyCODONE    IR 10 milliGRAM(s) Oral every 6 hours PRN Severe Pain (7 - 10)  prochlorperazine   Tablet 10 milliGRAM(s) Oral every 6 hours PRN for nausea      RADIOLOGY & ADDITIONAL TESTS:

## 2020-08-28 NOTE — PROGRESS NOTE ADULT - PROVIDER SPECIALTY LIST ADULT
Gastroenterology
Hospitalist
Internal Medicine
Internal Medicine
Palliative Care
Hospitalist

## 2020-08-28 NOTE — DISCHARGE NOTE NURSING/CASE MANAGEMENT/SOCIAL WORK - PATIENT PORTAL LINK FT
You can access the FollowMyHealth Patient Portal offered by [facility] by registering at the following website: [email or web address]. By joining [facility]’s FollowMyHealth portal, you will also be able to view your health information using other applications (apps) compatible with our system.

## 2020-08-28 NOTE — PROGRESS NOTE ADULT - REASON FOR ADMISSION
fever, cough

## 2020-08-28 NOTE — PROGRESS NOTE ADULT - ASSESSMENT
58 yo M with pmh DM2, HTN, HLD, Esophageal CA on chemo recently admitted for PICC line placement for chemo who states he developed fever yesterday and this morning represented with fever of 102.3 as well as cough with sputum production admitted with sepsis sec to aspiration pna and suspected occluded esophageal stent, acute respiratory failure with hypoxia and acute on chronic kidney disease stage 3. Patient is s.p 1 unit prbc. Family and patient have elected not to pursue further chemo. discharge to [facility] with transition  to LTC with emphasis on Comfort/symptom control     Problem/Plan - 1:  ·  Problem: Sepsis with acute hypoxic respiratory failure and septic shock, due to unspecified organism.  Plan: -acute, sec to asp pna   -_> PICC removed complete oral antibiotics on dc, last day 8/28  --> blood cx neg x 3  --> Esophagram showed patent stent. Small amount of residual barium in the cervical esophagus  Radiology recommended MBS as pt had coughing episodes while swallowing contrast. S & S re-eval called. MBS done with mech soft with thins recommended.  98% on RA     Problem/Plan - 2:  ·  Problem: Pneumonia.  Plan: -aspiration   -plan as above.        Problem/Plan - 3:  ·  Problem: anemia - improved  --> s.p 1 unit prbcs  --> H&H stable     Problem/Plan - 4:  ·  Problem: Hyponatremia. improved.      Problem/Plan - 5:  ·  Problem: Esophageal cancer stage 4  currently undergoing chemo via PICC. No plans for further chemo as per pt & sister. PICC removed  - outpatient follow up      Problem/Plan - 6:  Problem: HTN (hypertension). Plan: chronic, stable  cont home meds with parameters     Problem/Plan - 7:  ·  Problem: Type 2 diabetes mellitus without complication, without long-term current use of insulin.  -   -a1c is 6  --> dc ssi     Hypomagnesemia-  Mag replaced        ISIDRO on CKD 3  Avoid nephrotoxic agents  Renally adjust meds      Dispo: Dc to [facility], transition to LTC with comfort care 58 yo M with pmh DM2, HTN, HLD, Esophageal CA on chemo recently admitted for PICC line placement for chemo who states he developed fever yesterday and this morning represented with fever of 102.3 as well as cough with sputum production admitted with sepsis sec to aspiration pna and suspected occluded esophageal stent, acute respiratory failure with hypoxia and acute on chronic kidney disease stage 3. Patient is s.p 1 unit prbc. Family and patient have elected not to pursue further chemo. discharge to [facility] with transition  to LTC with emphasis on Comfort/symptom control     Problem/Plan - 1:  ·  Problem: Sepsis with acute hypoxic respiratory failure and septic shock, due to unspecified organism.  Plan: -acute, sec to asp pna   -_> PICC removed complete oral antibiotics on dc, last day 8/28  --> blood cx neg x 3  --> Esophagram showed patent stent. Small amount of residual barium in the cervical esophagus  Radiology recommended MBS as pt had coughing episodes while swallowing contrast. S & S re-eval called. [facility].  98% on RA     Problem/Plan - 2:  ·  Problem: Pneumonia.  Plan: -aspiration   -plan as above.        Problem/Plan - 3:  ·  Problem: anemia - improved  --> s.p 1 unit prbcs  --> H&H stable     Problem/Plan - 4:  ·  Problem: Hyponatremia. improved.      Problem/Plan - 5:  ·  Problem: Esophageal cancer stage 4  was undergoing chemo via PICC. No plans for further chemo as per pt & sister. PICC removed  - outpatient follow up with oncologist.      Problem/Plan - 6:  Problem: HTN (hypertension). Plan: chronic, stable  cont home meds with parameters     Problem/Plan - 7:  ·  Problem: Type 2 diabetes mellitus without complication, without long-term current use of insulin.  -   -a1c is 6  --> dc ssi     Hypomagnesemia-  Mag replaced        ISIDRO on CKD 3  Avoid nephrotoxic agents  Renally adjust meds, will monitor BMP    Dispo: Dc to [facility], transition to LTC with comfort care Once arrangement have been done.

## 2020-08-28 NOTE — PROGRESS NOTE ADULT - ATTENDING COMMENTS
I have personally seen and examined patient.  Above note reviewed and discussed with NP, modified where appropriate. Pt feel better. Mild intermittent cough per pt. Barium espophagogram done with normal contrast passing through stent but ?dysphagia at prox. esophagus. IR recommended S & S eval & MBS. No chemo as per sister. SW/ CCC to look for LTC.    PHYSICAL EXAM:    GENERAL: NAD,   HEENT: PERRL, +EOMI  NECK: soft, Supple, No JVD,   CHEST/LUNG: decreased BS otherwise clear  HEART: S1S2+, Regular rate and rhythm; No murmurs, rubs, or gallops  ABDOMEN: Soft, Nontender, Nondistended; Bowel sounds present  EXTREMITIES:  2+ Peripheral Pulses, No clubbing, cyanosis, or edema  SKIN: No rashes or lesions
Patient is seen and evaluated, case discussed with NP, agree with assessment and plan  please see d/c summary for further details.

## 2020-08-28 NOTE — PROGRESS NOTE ADULT - SUBJECTIVE AND OBJECTIVE BOX
CC: Aspiration PNA/ Esophageal Cancer    INTERVAL HPI/OVERNIGHT EVENTS: Patient seen and examined. No acute issues overnight. PICC line removed, no further chemo. Denies chest pain, SOB, dizziness, nausea, vomiting, fever, chills. Wants to shower.     Vital Signs Last 24 Hrs  T(C): 36.6 (28 Aug 2020 08:28), Max: 37.2 (27 Aug 2020 22:58)  T(F): 97.9 (28 Aug 2020 08:28), Max: 98.9 (27 Aug 2020 22:58)  HR: 96 (28 Aug 2020 08:28) (95 - 113)  BP: 108/71 (28 Aug 2020 08:28) (105/62 - 122/71)  BP(mean): --  RR: 18 (28 Aug 2020 08:28) (18 - 20)  SpO2: 98% (28 Aug 2020 08:28) (98% - 99%)      PHYSICAL EXAM:    GENERAL: NAD,   HEENT: PERRL, +EOMI  NECK: soft, Supple, No JVD,   CHEST/LUNG: decreased BS otherwise clear  HEART: S1S2+, Regular rate and rhythm; No murmurs, rubs, or gallops  ABDOMEN: Soft, Nontender, Nondistended; Bowel sounds present  EXTREMITIES:  2+ Peripheral Pulses, No clubbing, cyanosis, or edema  SKIN: No rashes or lesions  NEURO: AAOX3, no focal deficits,       I&O's Detail    27 Aug 2020 07:01  -  28 Aug 2020 07:00  --------------------------------------------------------  IN:  Total IN: 0 mL    OUT:    Voided: 900 mL  Total OUT: 900 mL    Total NET: -900 mL                                    8.9    5.40  )-----------( 289      ( 28 Aug 2020 07:23 )             27.9     28 Aug 2020 07:23    137    |  97     |  18.0   ----------------------------<  170    3.9     |  27.0   |  1.59     Ca    9.7        28 Aug 2020 07:23  Phos  3.6       28 Aug 2020 07:23  Mg     2.1       28 Aug 2020 07:23        CAPILLARY BLOOD GLUCOSE              MEDICATIONS  (STANDING):  atorvastatin 10 milliGRAM(s) Oral at bedtime  chlorhexidine 2% Cloths 1 Application(s) Topical daily  enoxaparin Injectable 40 milliGRAM(s) SubCutaneous daily  metoprolol succinate ER 50 milliGRAM(s) Oral daily  pantoprazole  Injectable 40 milliGRAM(s) IV Push every 12 hours  piperacillin/tazobactam IVPB.. 3.375 Gram(s) IV Intermittent every 8 hours  sucralfate 1 Gram(s) Oral two times a day    MEDICATIONS  (PRN):  lactulose Syrup 20 Gram(s) Oral daily PRN constipation  ondansetron   Disintegrating Tablet 8 milliGRAM(s) Oral every 8 hours PRN for nausea  oxyCODONE    IR 10 milliGRAM(s) Oral every 6 hours PRN Severe Pain (7 - 10)  prochlorperazine   Tablet 10 milliGRAM(s) Oral every 6 hours PRN for nausea      RADIOLOGY & ADDITIONAL TESTS: CC: Aspiration PNA/ Esophageal Cancer    INTERVAL HPI/OVERNIGHT EVENTS: Patient seen and examined. No acute issues overnight. PICC line removed, no further chemo. Denies chest pain, SOB, dizziness, nausea, vomiting, fever, chills. Wants to shower.     Vital Signs Last 24 Hrs  T(C): 36.6 (28 Aug 2020 08:28), Max: 37.2 (27 Aug 2020 22:58)  T(F): 97.9 (28 Aug 2020 08:28), Max: 98.9 (27 Aug 2020 22:58)  HR: 96 (28 Aug 2020 08:28) (95 - 113)  BP: 108/71 (28 Aug 2020 08:28) (105/62 - 122/71)  RR: 18 (28 Aug 2020 08:28) (18 - 20)  SpO2: 98% (28 Aug 2020 08:28) (98% - 99%)      PHYSICAL EXAM:    GENERAL: NAD,   HEENT: PERRL, +EOMI  NECK: soft, Supple, No JVD,   CHEST/LUNG: decreased BS otherwise clear  HEART: S1S2+, Regular rate and rhythm; No murmurs  ABDOMEN: Soft, Nontender, Nondistended; Bowel sounds present  EXTREMITIES:  2+ Peripheral Pulses, No edema  SKIN: No rashes or lesions  NEURO: AAOX3, no focal deficits,       I&O's Detail    27 Aug 2020 07:01  -  28 Aug 2020 07:00  --------------------------------------------------------  IN:  Total IN: 0 mL    OUT:    Voided: 900 mL  Total OUT: 900 mL    Total NET: -900 mL                                    8.9    5.40  )-----------( 289      ( 28 Aug 2020 07:23 )             27.9     28 Aug 2020 07:23    137    |  97     |  18.0   ----------------------------<  170    3.9     |  27.0   |  1.59     Ca    9.7        28 Aug 2020 07:23  Phos  3.6       28 Aug 2020 07:23  Mg     2.1       28 Aug 2020 07:23        CAPILLARY BLOOD GLUCOSE              MEDICATIONS  (STANDING):  atorvastatin 10 milliGRAM(s) Oral at bedtime  chlorhexidine 2% Cloths 1 Application(s) Topical daily  enoxaparin Injectable 40 milliGRAM(s) SubCutaneous daily  metoprolol succinate ER 50 milliGRAM(s) Oral daily  pantoprazole  Injectable 40 milliGRAM(s) IV Push every 12 hours  piperacillin/tazobactam IVPB.. 3.375 Gram(s) IV Intermittent every 8 hours  sucralfate 1 Gram(s) Oral two times a day    MEDICATIONS  (PRN):  lactulose Syrup 20 Gram(s) Oral daily PRN constipation  ondansetron   Disintegrating Tablet 8 milliGRAM(s) Oral every 8 hours PRN for nausea  oxyCODONE    IR 10 milliGRAM(s) Oral every 6 hours PRN Severe Pain (7 - 10)  prochlorperazine   Tablet 10 milliGRAM(s) Oral every 6 hours PRN for nausea      RADIOLOGY & ADDITIONAL TESTS:

## 2020-08-29 PROBLEM — E11.9 TYPE 2 DIABETES MELLITUS WITHOUT COMPLICATIONS: Chronic | Status: ACTIVE | Noted: 2018-08-25

## 2020-09-04 NOTE — HISTORY OF PRESENT ILLNESS
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  He initially saw GI, Dr. Jacek Adhikari, in March 2019 due to anemia.  A colonoscopy was done in July 2019 and an EGD was discussed but not done until 5/20/20 when he was f/w an esophageal mass.  Pathology c/w adenocarcinoma.  Staging CT showed  marked thickening of GE junction without definable tumor. There is evidence of small gastrohepatic ligament lymph nodes measuring up to 1.2 cm in size.  New radiographic finding of a mid small bowel lesion with post symmetrical and asymmetrical thickening of the bowel wall without significant surrounding inflammatory process or definable intussusception. Given the focal nature this is more suspicious for small bowel tumor as opposed to an inflammatory process. [de-identified] : Esophageal adenocarcinoma, HER-2 positive [de-identified] : Past medical history of DM, HTN, and CKD\par SH: Non - smoker and no ETOH.  No environmental exposures.  No hx of reflux.  There is a hx of second hand smoke.  Pt lives with his mother.   Pt works as a  but now on unemployment.   [de-identified] : Pt presents for follow up visit during C3D1 induction chemotherapy for dysphagia with carbo /taxol.  Due to inability to tolerate solids and only puréed diet with weight loss, GI placed esophageal stent 5/28/20 and he can now tolerate solids that are soft.   He reports a 20 -lb weight loss over 8 weeks.  Screening colonoscopy 7/19/20. Today, denies diarrhea, back pain, sob, cough, n/v/d or any other sx.

## 2020-09-04 NOTE — RESULTS/DATA
[FreeTextEntry1] : 8/21/20 Retroperitoneal lymph node, biopsy:\par -Scant fragments of poorly differentiated carcinoma\par \par 8/21/20 CT:  Multifocal pneumonia, worsened since August 13, 2000.Fluid-filled dilatation of the esophagus, which may put the patient at risk for aspiration.  Distal esophageal mass compatible with known history of esophageal cancer.  Metastatic retroperitoneal lymphadenopathy, partially imaged.  Fluid-filled dilatation of the stomach.\par \par 8/21/20 CT:  Worsening multifocal pneumonia since prior CT of 8/13/2020. Please refer to dedicated CT chest performed earlier today.  Gastric distention with fluid.  Distal esophageal stent is unchanged in location, traversing the patient's known gastroesophageal mass. Fluid in the distal esophagus; recommend aspiration precautions.\par \par 6/24/20 PET/CT: \par 1.  FDG avid soft tissue thickening distal esophagus/GE junction consistent with known malignancy. Prominent FDG avidity in the gastric fundal portion of the esophageal stent, possibly due to inflammation related to placement. Clinical correlation suggested.\par 2.  FDG avid right retrocaval/retroperitoneal lymph node at the level of the renal vein, suspicious for metastatic disease, unchanged in size since 5/6/2020 and 5/26/2020. Small gastrohepatic lymph nodes are possibly metastatic, but too small to characterize.\par 3.  Widespread small and large bowel FDG avidity due to metformin treatment and incomplete opacification of bowel loops by oral contrast limits evaluation for detection of small bowel lesion seen on CT abdomen/pelvis May 20, 2020. Lesion remains indeterminate and cannot be further characterized by FDG PET/CT. Consider further evaluation with small bowel enteroscopy or capsule endoscopy.\par \par 5/26/20 CT A/P:\par When compared to previous recent examination there is a new radiographic finding of a mid small bowel lesion with post symmetrical and asymmetrical thickening of the bowel wall without significant surrounding inflammatory process or definable intussusception. Given the focal nature this is more suspicious for small bowel tumor as opposed to an inflammatory process. In a patient with recently diagnosed esophageal cancer marked thickening GE junction is seen without definable tumor. There is evidence of small gastrohepatic ligament lymph nodes measuring up to 1.2 cm in size.\par \par 5/20/20 Pathology: \par Distal esophagus "mass" biopsy --Esophagus, distal mass, biopsy \par - Fragments of moderately-differentiated adenocarcinoma.\par - Tumor undermines squamous mucosa.\par \par 7/8/19 pathology: Colon polyp x 2,  rectum, biopsy\par - Sessile serrated Adenoma x 2 fragments\par - No high grade lesion identified

## 2020-10-05 NOTE — CONSULT NOTE ADULT - CONSULT REQUESTED BY NAME
Dr. Patton Spine appears normal, range of motion is not limited, no muscle or joint tenderness, non tender paraspinal C/T/L spine, no palpable stepoffs

## 2020-11-11 PROBLEM — R13.10 DYSPHAGIA, UNSPECIFIED TYPE: Status: ACTIVE | Noted: 2020-01-01

## 2020-11-11 PROBLEM — R10.9 ABDOMINAL PAIN: Status: ACTIVE | Noted: 2020-01-01

## 2020-11-11 PROBLEM — R11.11 VOMITING WITHOUT NAUSEA, INTRACTABILITY OF VOMITING NOT SPECIFIED, UNSPECIFIED VOMITING TYPE: Status: ACTIVE | Noted: 2020-01-01

## 2020-11-25 NOTE — REASON FOR VISIT
[Follow-Up Visit] : a follow-up [Other Location: e.g. School (Enter Location, City,State)___] : at [unfilled], at the time of the visit. [Medical Office: ([facility])___] : at the medical office located in  [Other:____] : [unfilled] [Verbal consent obtained from patient] : the patient, [unfilled]

## 2020-11-28 NOTE — H&P ADULT - NSICDXPASTMEDICALHX_GEN_ALL_CORE_FT
PAST MEDICAL HISTORY:  Diabetes type 2    Esophageal cancer stent placement May 2020    History of gastroesophageal reflux (GERD)     HLD (hyperlipidemia)     HTN (hypertension)

## 2020-11-28 NOTE — ED PROVIDER NOTE - PROGRESS NOTE DETAILS
Given the significant and immediate threats to this patient based on initial presentation, the benefits of emergency contrast-enhanced CT imaging without obtaining GFR/creatinine serum level results greatly outweight the potential risk of harm due to contrast-induced nephropathy.

## 2020-11-28 NOTE — ED ADULT NURSE NOTE - CHIEF COMPLAINT QUOTE
pt A&Ox [facility]/p near syncopal episode this AM. Staff reports recent narcotic changes, pt on hospice for esophageal CA but full code. Pt offers no complaints, states "I just feel weird and I am a little SOB." NH staff wants to rule out MI

## 2020-11-28 NOTE — H&P ADULT - NSHPREVIEWOFSYSTEMS_GEN_ALL_CORE
Review of Systems:  CONSTITUTIONAL: No fevers or chills  EYES/ENT: No visual changes;  No vertigo or throat pain   NECK: No pain or stiffness  RESPIRATORY: No cough, wheezing, hemoptysis; No shortness of breath  CARDIOVASCULAR: No palpitations  GASTROINTESTINAL: +abdominal pain, no epigastric pain. No nausea, vomiting, or hematemesis; No diarrhea or constipation.   GENITOURINARY: No dysuria, frequency or hematuria  NEUROLOGICAL: No numbness or weakness  SKIN: No itching, burning, rashes, or lesions   All other review of systems is negative unless indicated above

## 2020-11-28 NOTE — H&P ADULT - ASSESSMENT
60 y/o M with PMH of HTN, HLD, DM, CKD, stage 4 esophageal cancer on hospice admitted for pre-syncope.     Presyncopal episode in the setting of GI Bleed  - Hgb 5.1 (8.9 in 8/2020)  - transfuse 3 units PRBC  - IV Protonix 40mg BID  - GI consulted from ED  - CBC q6h    DM  - FS with RICHMOND    HTN  - Lisinopril 2.5mg daily  - Toprol XL 50mg daily    HLD  - Lipitor 10mg nightly    Stage 4 Esophageal Cancer  - Oxycodone 10mg q6h PRN    DVT ppx  - SCDs 60 y/o M with PMH of HTN, HLD, DM, CKD, stage 4 esophageal cancer on hospice admitted for pre-syncope.     Presyncopal episode in the setting of GI Bleed  - Hgb 5.1 (8.9 in 8/2020)  - transfuse 3 units PRBC  - IV Protonix 40mg BID  - GI consulted from ED  - CBC q6h    ISIDRO on CKD  - monitor BMP    DM  - FS with R[facility]MOND    HTN  - Lisinopril 2.5mg daily  - Toprol XL 50mg daily    HLD  - Lipitor 10mg nightly    Stage 4 Esophageal Cancer  - Oxycodone 10mg q6h PRN    DVT ppx  - SCDs

## 2020-11-28 NOTE — ED ADULT NURSE NOTE - OBJECTIVE STATEMENT
Pt was sent here from [facility] for a near syncopal episode this morning.  Pt has hx of esophogeal cancer and his cancer doctor's are here.  Pt is a/ox4, skin W&D color pale, c/o SOB with exertion.

## 2020-11-28 NOTE — H&P ADULT - NSHPPHYSICALEXAM_GEN_ALL_CORE
Vital Signs Last 24 Hrs  T(F): 98.4 (28 Nov 2020 13:04), Max: 98.4 (28 Nov 2020 13:04)  HR: 111 (28 Nov 2020 13:04) (111 - 111)  BP: 100/58 (28 Nov 2020 13:04) (100/58 - 100/58)  RR: 18 (28 Nov 2020 13:04) (18 - 18)  SpO2: 95% (28 Nov 2020 13:04) (95% - 95%)    Physical Exam:  Constitutional: alert and oriented, in no acute distress, pale  Neck: Soft and supple  Respiratory: Clear to auscultation bilaterally, no wheezes or crackles  Cardiovascular: Tachycardic, regular rhythm, no murmurs, gallops, rubs  Gastrointestinal: Soft, non-tender to palpation, +bs  Vascular: 2+ peripheral pulses  Neurological: A/O x 3, no focal neurological deficits  Musculoskeletal: no lower extremity edema bilaterally  Skin: no rashes  Psych: normal affect

## 2020-11-28 NOTE — CONSULT NOTE ADULT - ASSESSMENT
Stage 4 metastatic Esophageal CA.  Chemo abandoned months ago.  Adequately stented and still with moderate dysphagia.  Same pureed diet.   Suspect local advancement of the tumor causing blood loss anemia.  No role for EGD or cautery of any other GI work up.  Hospice pt who wishes only to get transfused and return to [facility].    Recall GI as needed.

## 2020-11-28 NOTE — ED PROVIDER NOTE - CLINICAL SUMMARY MEDICAL DECISION MAKING FREE TEXT BOX
labs and imaging reviewed.  Pt with gi bleed.  Hgb 5.1. 3 Units PRBCs ordered.  Pt on hospice but still wants admission and blood xfusion.  SW spoke with his hospice care team and confirmed plan.  Case d/w Dr. Ann who will admit patient. GI paged for consult.

## 2020-11-28 NOTE — ED PROVIDER NOTE - OBJECTIVE STATEMENT
Pt is a 60 yo M co L-abd pain and near syncope.  PMHx significant for htn, dm, hld, esophageal CA. Pt states that for the past 3 d he has had intermittent sharp L-sided abd pain that radiates to the back. Pt states that the pain is worse with breathing. Pt states that he had it this morning but it is currently improved. Pt states that this morning he was getting ready to go to breakfast when he had sudden onset of lightheaded/dizziness and felt like he was going to pass out. Pt states that he never completely passed out. no n/v. no fever/chills. no cp. no sob. no other complaints.

## 2020-11-28 NOTE — H&P ADULT - HISTORY OF PRESENT ILLNESS
60 y/o M with PMH of HTN, HLD, DM, CKD, stage 4 esophageal cancer on hospice presents from [facility] with complaints of near syncope. The patient states that he was trying to get up from his bed to his chair and when he did he felt lightheaded and dizzy. Patient states he almost fell down and at the same time was having abdominal pain.

## 2020-11-28 NOTE — CONSULT NOTE ADULT - SUBJECTIVE AND OBJECTIVE BOX
HPI:  60 y/o M with PMH of HTN, HLD, DM, CKD, stage 4 esophageal cancer on hospice presents from [facility] with complaints of near syncope. The patient states that he was trying to get up from his bed to his chair and when he did he felt lightheaded and dizzy. Patient states he almost fell down and at the same time was having abdominal pain.    GI hx:  benign colon polyps last removed 1.5 yrs ago.  Presented with dysphagia in 5/20 and EGD showed a 5 cm distal obstructing Esophageal tumor; Bx + for Adenocarcinoma.    Subsequent EUS:  T3, N0, Mx lesions.  Metal stent (SEMS) placed 5/28/20 by [facility].    Subsequently received 3 cycles of chemo:  Platinum and Taxol.  No further chemo or RT were given.  IR guided biopsy of a new RP node in mid abdomen confirmed metastatic carcinoma.  Pt entered Hospice at [facility] ([place], [place]) in late 8/20.  Now on reasonable pain control with Fentanyl patches.  Still able to tolerate a pureed diet.  He was not aware of any bleeding issues.  Takes no anti platelet or AC medication.  Denies hematemesis or melena or hematochezia.    Had near syncopal episode today with transferring to lounge chair. Stool + FOBT.  Hgb at 5.1 gm.      Notes left uper abd pain.  No constipation.  No fever or SOB or chest pain.        PAST MEDICAL & SURGICAL HISTORY:  HTN (hypertension)    HLD (hyperlipidemia)    History of gastroesophageal reflux (GERD)    Esophageal cancer  stent placement May 2020    Diabetes  type 2    H/O colonoscopy    History of cancer chemotherapy        ROS:  No Heartburn, regurgitation, dysphagia, odynophagia.  No dyspepsia  No abdominal pain.    No Nausea, vomiting.  No Bleeding.  No hematemesis.   No diarrhea.    No hematochesia.  No weight loss, anorexia.  No edema.      MEDICATIONS  (STANDING):  atorvastatin 10 milliGRAM(s) Oral at bedtime  lisinopril Oral Tab/Cap - Peds 2.5 milliGRAM(s) Oral daily  metoprolol succinate ER 50 milliGRAM(s) Oral daily  pantoprazole  Injectable 40 milliGRAM(s) IV Push every 12 hours  sodium chloride 0.9%. 1000 milliLiter(s) (50 mL/Hr) IV Continuous <Continuous>    MEDICATIONS  (PRN):  oxyCODONE    IR 10 milliGRAM(s) Oral every 6 hours PRN Severe Pain (7 - 10)      Allergies    Allergy Status Unknown    Intolerances    Gelatein BID (Unknown)  requests tuna salad (Unknown)      SOCIAL HISTORY:    FAMILY HISTORY:  FHx: throat cancer  father, former heavy smoker    FHx: diabetes mellitus  maternal grandmother        Vital Signs Last 24 Hrs  T(C): 36.8 (28 Nov 2020 21:06), Max: 36.9 (28 Nov 2020 13:04)  T(F): 98.3 (28 Nov 2020 21:06), Max: 98.5 (28 Nov 2020 20:27)  HR: 109 (28 Nov 2020 21:06) (106 - 111)  BP: 142/71 (28 Nov 2020 21:06) (100/58 - 142/71)  BP(mean): --  RR: 16 (28 Nov 2020 21:06) (16 - 20)  SpO2: 100% (28 Nov 2020 18:22) (95% - 100%)    PHYSICAL EXAM:    GENERAL: NAD, well-groomed, well-developed  HEAD:  Atraumatic, Normocephalic  EYES: EOMI, PERRLA, conjunctiva and sclera clear  ENMT: No tonsillar erythema, exudates, or enlargement; Moist mucous membranes, Good dentition, No lesions  NECK: Supple, No JVD, Normal thyroid  CHEST/LUNG: Clear to percussion bilaterally; No rales, rhonchi, wheezing, or rubs  HEART: Regular rate and rhythm; No murmurs, rubs, or gallops  ABDOMEN: Soft, Nontender, Nondistended; Bowel sounds present  EXTREMITIES:  2+ Peripheral Pulses, No clubbing, cyanosis, or edema  LYMPH: No lymphadenopathy noted  SKIN: No rashes or lesions      LABS:                        5.1    7.73  )-----------( 270      ( 28 Nov 2020 14:39 )             15.9     11-28    131<L>  |  93<L>  |  54.0<H>  ----------------------------<  193<H>  4.7   |  27.0  |  1.92<H>    Ca    8.0<L>      28 Nov 2020 14:39  Mg     2.1     11-28    TPro  6.8  /  Alb  3.1<L>  /  TBili  <0.2<L>  /  DBili  x   /  AST  7   /  ALT  <5  /  AlkPhos  69  11-28    PT/INR - ( 28 Nov 2020 14:39 )   PT: 13.6 sec;   INR: 1.18 ratio         PTT - ( 28 Nov 2020 14:39 )  PTT:26.8 sec       LIVER FUNCTIONS - ( 28 Nov 2020 14:39 )  Alb: 3.1 g/dL / Pro: 6.8 g/dL / ALK PHOS: 69 U/L / ALT: <5 U/L / AST: 7 U/L / GGT: x           lIPASE:  15  bnp: 338.     RADIOLOGY & ADDITIONAL STUDIES:

## 2020-11-28 NOTE — ED PROVIDER NOTE - PHYSICAL EXAMINATION
Constitutional - well-developed; well nourished. Head - NCAT. Airway patent. Eyes - PERRL. CV - RRR. no murmur. no edema. Pulm - CTAB. Abd - soft, L-sided abd ttp. no rebound. no guarding. Neuro - A&Ox3. strength 5/5 x4. sensation intact x4. normal gait. Skin - No rash. MSK - normal ROM.

## 2020-11-28 NOTE — ED PROVIDER NOTE - NS ED ROS FT
No fever/chills, No photophobia/eye pain/changes in vision, No ear pain/sore throat/dysphagia, No chest pain/palpitations, no SOB/cough/wheeze/stridor, +abdominal pain, No N/V/D, no dysuria/frequency/discharge, No neck/back pain, no rash, no changes in neurological status/function.

## 2020-11-28 NOTE — H&P ADULT - NSHPLABSRESULTS_GEN_ALL_CORE
5.1    7.73  )-----------( 270      ( 28 Nov 2020 14:39 )             15.9   11-28    131<L>  |  93<L>  |  54.0<H>  ----------------------------<  193<H>  4.7   |  27.0  |  1.92<H>    Ca    8.0<L>      28 Nov 2020 14:39  Mg     2.1     11-28    TPro  6.8  /  Alb  3.1<L>  /  TBili  <0.2<L>  /  DBili  x   /  AST  7   /  ALT  <5  /  AlkPhos  69  11-28

## 2020-11-29 NOTE — DISCHARGE NOTE PROVIDER - HOSPITAL COURSE
60 y/o M with PMH of HTN, HLD, DM, CKD, stage 4 esophageal cancer on hospice presents from [facility] with complaints of near syncope. The patient states that he was trying to get up from his bed to his chair and when he did he felt lightheaded and dizzy. He was noted to have a hgb of 5.1 on admission. Gi was consulted and they Suspect local advancement of the tumor causing blood loss anemia.  No role for EGD or cautery of any other GI work up.  Hospice pt who wishes only to get transfused and return to [facility].  Patient received 3 units of rbc and hgb improved to 9.0. patient denies any bleeding. He will be discharged back to [facility] on hospice. Patient and sister Rashmi in agreement and updated.     Vital Signs Last 24 Hrs  T(C): 36.7 (29 Nov 2020 07:34), Max: 37.2 (28 Nov 2020 23:34)  T(F): 98.1 (29 Nov 2020 07:34), Max: 98.9 (28 Nov 2020 23:34)  HR: 92 (29 Nov 2020 07:34) (92 - 111)  BP: 114/65 (29 Nov 2020 07:34) (100/58 - 142/71)  BP(mean): --  RR: 20 (29 Nov 2020 07:34) (16 - 20)  SpO2: 92% (29 Nov 2020 07:34) (92% - 100%)    PHYSICAL EXAM:  Constitutional: No acute distress, alert and oriented by 3  HEENT: AT/NC, EOMI, PERRLA, Normal conjunctiva, no pharyngeal erythema, moist oral mucosa  Respiratory: CTA BL, equal breath sounds, no crackles or wheezing  Cardiovascular: RRR, no edema  Gastrointestinal: soft, Non-tender, Non-distended + Bowel sounds, no rebound or guarding  Musculoskeletal: No joint edema  Neurological: CN 2-12 grossly intact, no focal deficits  Skin: warm, dry and intact  Psychiatric: normal mood and affect    32 minutes spent on discharge.      58 y/o M with PMH of HTN, HLD, DM, CKD, stage 4 esophageal cancer on hospice presents from [facility] with complaints of near syncope. The patient states that he was trying to get up from his bed to his chair and when he did he felt lightheaded and dizzy. He was noted to have a hgb of 5.1 on admission. Gi was consulted and they Suspect local advancement of the tumor causing blood loss anemia.  No role for EGD or cautery of any other GI work up.  Hospice pt who wishes only to get transfused and return to [facility].  Patient received 3 units of rbc and hgb improved to 9.0. patient denies any bleeding. He will be discharged back to [facility] on hospice. Patient and sister Rashmi in agreement and updated. palliative care was consulted for medication optimization. patient had an anxiety attack and was started on as needed xanax. Medications to be titrated by hospice.     Vital Signs Last 24 Hrs  T(C): 36.1 (01 Dec 2020 11:37), Max: 36.9 (30 Nov 2020 22:19)  T(F): 97 (01 Dec 2020 11:37), Max: 98.5 (30 Nov 2020 22:19)  HR: 110 (01 Dec 2020 11:37) (80 - 112)  BP: 144/76 (01 Dec 2020 11:37) (95/57 - 144/76)  BP(mean): --  RR: 20 (01 Dec 2020 11:37) (18 - 20)  SpO2: 98% (01 Dec 2020 11:37) (92% - 98%)    PHYSICAL EXAM:  Constitutional: No acute distress, alert and oriented by 3  HEENT: AT/NC, EOMI, PERRLA, Normal conjunctiva, no pharyngeal erythema, moist oral mucosa  Respiratory: CTA BL, equal breath sounds, no crackles or wheezing  Cardiovascular: RRR, no edema  Gastrointestinal: soft, Non-tender, Non-distended + Bowel sounds, no rebound or guarding  Musculoskeletal: No joint edema  Neurological: CN 2-12 grossly intact, no focal deficits  Skin: warm, dry and intact  Psychiatric: normal mood and affect    32 minutes spent on discharge.

## 2020-11-29 NOTE — PROGRESS NOTE ADULT - SUBJECTIVE AND OBJECTIVE BOX
[facility] Division of Hospital Medicine    Chief Complaint: anemia     SUBJECTIVE / OVERNIGHT EVENTS: patient seen and examined. He is feeling better. hg 9.0. Patient ready for dc back to [facility] on hospice but notified by SW that since he got admitted he needs auth. Cannot be dc today.     Patient denies chest pain, SOB, abd pain, N/V, fever, chills, dysuria or any other complaints. All remainder ROS negative.     MEDICATIONS  (STANDING):  atorvastatin 10 milliGRAM(s) Oral at bedtime  lisinopril Oral Tab/Cap - Peds 2.5 milliGRAM(s) Oral daily  metoprolol succinate ER 50 milliGRAM(s) Oral daily  pantoprazole  Injectable 40 milliGRAM(s) IV Push every 12 hours  sodium chloride 0.9%. 1000 milliLiter(s) (50 mL/Hr) IV Continuous <Continuous>    MEDICATIONS  (PRN):  oxyCODONE    IR 10 milliGRAM(s) Oral every 6 hours PRN Severe Pain (7 - 10)        I&O's Summary    2020 07:01  -  2020 07:00  --------------------------------------------------------  IN: 875 mL / OUT: 350 mL / NET: 525 mL        PHYSICAL EXAM:  Vital Signs Last 24 Hrs  T(C): 36.8 (2020 11:16), Max: 37.2 (2020 23:34)  T(F): 98.3 (2020 11:16), Max: 98.9 (2020 23:34)  HR: 95 (2020 11:16) (92 - 109)  BP: 110/70 (2020 11:16) (104/68 - 142/71)  BP(mean): --  RR: 18 (2020 11:16) (16 - 20)  SpO2: 92% (2020 11:16) (92% - 100%)        CONSTITUTIONAL: NAD, well-developed, well-groomed  ENMT: Moist oral mucosa, no pharyngeal injection or exudates  RESPIRATORY: Normal respiratory effort; lungs are clear to auscultation bilaterally  CARDIOVASCULAR: Regular rate and rhythm, normal S1 and S2, no murmur/rub/gallop; No lower extremity edema;  ABDOMEN: Nontender to palpation, normoactive bowel sounds, no rebound/guarding;  MUSCLOSKELETAL: no clubbing or cyanosis of digits; no joint swelling or tenderness to palpation  PSYCH: A+O to person, place, and time; affect appropriate  NEUROLOGY: CN 2-12 are intact and symmetric; no gross sensory deficits;   SKIN: No rashes; no palpable lesions    LABS:                        9.0    7.19  )-----------( 237      ( 2020 06:39 )             26.7     11-    134<L>  |  98  |  44.0<H>  ----------------------------<  144<H>  4.4   |  25.0  |  1.50<H>    Ca    8.9      2020 06:39  Mg     2.1     11-    TPro  6.8  /  Alb  3.1<L>  /  TBili  <0.2<L>  /  DBili  x   /  AST  7   /  ALT  <5  /  AlkPhos  69  11-28    PT/INR - ( 2020 14:39 )   PT: 13.6 sec;   INR: 1.18 ratio         PTT - ( 2020 14:39 )  PTT:26.8 sec  CARDIAC MARKERS ( 2020 14:39 )  x     / <0.01 ng/mL / x     / x     / x          Urinalysis Basic - ( 2020 02:49 )    Color: Yellow / Appearance: Clear / S.015 / pH: x  Gluc: x / Ketone: Negative  / Bili: Negative / Urobili: Negative mg/dL   Blood: x / Protein: 30 mg/dL / Nitrite: Negative   Leuk Esterase: Negative / RBC: x / WBC x   Sq Epi: x / Non Sq Epi: x / Bacteria: x        CAPILLARY BLOOD GLUCOSE            RADIOLOGY & ADDITIONAL TESTS:  Results Reviewed:   Imaging Personally Reviewed:  Electrocardiogram Personally Reviewed:

## 2020-11-29 NOTE — DISCHARGE NOTE PROVIDER - NSDCCPCAREPLAN_GEN_ALL_CORE_FT
PRINCIPAL DISCHARGE DIAGNOSIS  Diagnosis: Gastrointestinal hemorrhage, unspecified gastrointestinal hemorrhage type  Assessment and Plan of Treatment: due to malignancy. recived blood transfusion and hgb imporved. Dc back to [facility] on hospice.       PRINCIPAL DISCHARGE DIAGNOSIS  Diagnosis: Gastrointestinal hemorrhage, unspecified gastrointestinal hemorrhage type  Assessment and Plan of Treatment: due to malignancy. recived blood transfusion and hgb imporved. Dc back to [facility] on hospice.      SECONDARY DISCHARGE DIAGNOSES  Diagnosis: Cancer-related pain  Assessment and Plan of Treatment: fentanyl patch increased to 25mcg  plese continue bowel regiemen  hospice to uptitrate pain meds    Diagnosis: Anxiety  Assessment and Plan of Treatment: xanax prn  up titrate meds per hospice

## 2020-11-29 NOTE — PROGRESS NOTE ADULT - ASSESSMENT
58 y/o M with PMH of HTN, HLD, DM, CKD, stage 4 esophageal cancer on hospice admitted for pre-syncope due to anemia of 5.1 on admission.     Presyncopal episode in the setting of GI Bleed  - Hgb 5.1 (8.9 in 8/2020)  - transfuse 3 units PRBC today hgb 9.0  - IV Protonix 40mg BID  - GI consulted, they Suspect local advancement of the tumor causing blood loss anemia.  No role for EGD or cautery of any other GI work up.  Hospice pt who wishes only to get transfused and return to [facility].    ISIDRO on CKD  - monitor BMP    DM  - FS with R[facility]MOND    HTN  - Lisinopril 2.5mg daily  - Toprol XL 50mg daily    HLD  - Lipitor 10mg nightly    Stage 4 Esophageal Cancer  - Oxycodone 10mg q6h PRN  - restart home fentanyl 12mcg    DVT ppx  - SCDs  dispo: dc back to [facility] on hospice once auth obtained. Patient medically ready fro dc. CM/SW aware  sister updated.

## 2020-11-29 NOTE — DISCHARGE NOTE PROVIDER - NSDCMRMEDTOKEN_GEN_ALL_CORE_FT
atorvastatin 10 mg oral tablet: 1 tab(s) orally once a day (at bedtime)  fentaNYL 12 mcg/hr transdermal film, extended release: 1 film(s) transdermal every 72 hours  Januvia 50 mg oral tablet: 1 tab(s) orally once a day  lactulose 10 g/15 mL oral syrup: 30 milliliter(s) orally once a day, As Needed constipation  lisinopril 2.5 mg oral tablet: 1 tab(s) orally once a day in morning  magnesium oxide 400 mg (241.3 mg elemental magnesium) oral tablet: 1 tab(s) orally 3 times a day  Metoprolol Succinate ER 50 mg oral tablet, extended release: 1 tab(s) orally once a day  ondansetron 8 mg oral tablet, disintegratin tab(s) orally every 8 hours, As Needed - for nausea  oxyCODONE 10 mg oral tablet: 1 tab(s) orally every 6 hours, As needed, Severe Pain (7 - 10)  pantoprazole 20 mg oral delayed release tablet: 1 tab(s) orally 2 times a day  prochlorperazine 10 mg oral tablet: 1 tab(s) orally every 6 hours, As needed, for nausea  sucralfate 1 g oral tablet: 1 tab(s) orally 2 times a day, As Needed for GI ulcers   ALPRAZolam 0.25 mg oral tablet: 1 tab(s) orally every 8 hours, As needed, anxiety  atorvastatin 10 mg oral tablet: 1 tab(s) orally once a day (at bedtime)  fentaNYL 25 mcg/hr transdermal film, extended release: 1 patch transdermal every 72 hours  Januvia 50 mg oral tablet: 1 tab(s) orally once a day  lactulose 10 g/15 mL oral syrup: 30 milliliter(s) orally once a day, As Needed constipation  lidocaine 5% topical film:  topically   lisinopril 2.5 mg oral tablet: 1 tab(s) orally once a day in morning  magnesium oxide 400 mg (241.3 mg elemental magnesium) oral tablet: 1 tab(s) orally 3 times a day  Metoprolol Succinate ER 50 mg oral tablet, extended release: 1 tab(s) orally once a day  ondansetron 8 mg oral tablet, disintegratin tab(s) orally every 8 hours, As Needed - for nausea  oxyCODONE 10 mg oral tablet: 1 tab(s) orally every 6 hours, As needed, Severe Pain (7 - 10)  pantoprazole 40 mg oral delayed release tablet: 1 tab(s) orally 2 times a day  polyethylene glycol 3350 oral powder for reconstitution: 17 gram(s) orally once a day, As needed, Constipation  prochlorperazine 10 mg oral tablet: 1 tab(s) orally every 6 hours, As needed, for nausea  senna oral tablet: 2 tab(s) orally once a day (at bedtime)  sucralfate 1 g oral tablet: 1 tab(s) orally 2 times a day, As Needed for GI ulcers  tamsulosin 0.4 mg oral capsule: 1 cap(s) orally once a day (at bedtime)

## 2020-11-29 NOTE — CHART NOTE - NSCHARTNOTEFT_GEN_A_CORE
SW NOTE: THIS WRITER RECEIVED CALL FROM MD VICKERS STATING PT IS MEDICALLY STABLE TO B D/C. PER CHART REVIEW, PT IS ADMITTED AT [facility], SHORT-TERM AT [facility] AND ON HOSPICE WITH [facility].  WRITER ADVISED MD THAT SINCE PT IS ADMITTED, HE WILL BE NEEDING NEW [facility] AND INS AUTH TO RETURN. DUE TO THE WEEKEND, AUTH CAN NOT BE OBTAIN TODAY 11/29, MD STATED UNDERSTANDING AND IN AGREEMENT. PT HAS [facility] AND WILL BE NEEDING AUTH TO RETURN TO [facility]. [facility] REFERRAL TO [facility] (PER O/C SCARLET LOUISPARD RN). WRITER CALLED PT'S [facility]  TO INFORM OF PT NOT BEING D/C 11/29 PENDING AUTH APPROVAL. POST-ACUTE AUTH STATED ALREADY AND TO BE COMPLETED BY [facility] OR [facility]. MD,RN, AND FAMILY ALL AWARE ABOVE. D/C PLAN IS PENDING AUTH APPROVAL. SW TO FOLLOW WITH SAFE D/C. SW NOTE: THIS WRITER RECEIVED CALL FROM MD VICKERS STATING PT IS MEDICALLY STABLE TO BE D/C. PER CHART REVIEW, PT IS ADMITTED AT [facility], SHORT-TERM AT [facility] AND ON HOSPICE WITH [facility].  WRITER ADVISED MD THAT SINCE PT IS ADMITTED, HE WILL BE NEEDING NEW [facility]IRA AND INS AUTH TO RETURN. DUE TO THE WEEKEND, AUTH CAN NOT BE OBTAIN TODAY 11/29, MD STATED UNDERSTANDING AND IN AGREEMENT. PT HAS [facility] AND WILL BE NEEDING AUTH TO RETURN TO [facility]. [facility] NEW [facility] REFERRAL TO [facility] (PER [facility] RN-SHE). WRITER CALLED PT'S [facility]  TO INFORM OF PT NOT BEING D/C 11/29 PENDING AUTH APPROVAL. POST-ACUTE AUTH STATED ALREADY AND TO BE COMPLETED BY [facility]SO OR DYLON IN THE AM. MD,RN, AND FAMILY ALL AWARE ABOVE. D/C PLAN IS PENDING AUTH APPROVAL AND P/T EVALUATION. SW TO CONTINUE FOLLOW WITH SAFE D/C. SW NOTE: THIS WRITER RECEIVED CALL FROM MD VICKERS STATING PT IS MEDICALLY STABLE TO BE D/C. PER CHART REVIEW, PT IS ADMITTED AT [facility], SHORT-TERM AT [facility] AND ON HOSPICE WITH [facility].  WRITER ADVISED MD THAT SINCE PT IS ADMITTED, HE WILL BE NEEDING NEW JAHAIRA AND INS AUTH TO RETURN. DUE TO THE WEEKEND, AUTH CAN NOT BE OBTAIN TODAY 11/29, MD STATED UNDERSTANDING AND IN AGREEMENT. PT HAS [facility] MEDICAID AND WILL BE NEEDING AUTH TO RETURN TO [facility]. [facility] A NEW HOSPICE REFERRAL TO [facility] (PER [facility] RN-SHE). WRITER CALLED PT'S [facility]  TO INFORM OF PT NOT BEING D/C 11/29 PENDING AUTH APPROVAL. POST-ACUTE AUTH STATED ALREADY AND TO BE COMPLETED BY [facility] ALFONSO OR DYLON IN THE AM. MD,RN, AND FAMILY ALL AWARE ABOVE. D/C PLAN IS PENDING AUTH APPROVAL AND P/T EVALUATION. SW TO CONTINUE FOLLOW WITH SAFE D/C.

## 2020-11-29 NOTE — PROGRESS NOTE ADULT - ASSESSMENT
Anemia ; + FOBT.  Metastatic Esophageal Ca.  No endoscopic intervention possible.  Ready for Discharge from a GI POV.

## 2020-11-29 NOTE — PROGRESS NOTE ADULT - SUBJECTIVE AND OBJECTIVE BOX
Patient seen and examined; events noted.  No BM's or overt bleeding.  Transfused 3 units of blood and well tolerated.  Hgb up to 9.  Feels fine.      PAST MEDICAL & SURGICAL HISTORY:  HTN (hypertension)    HLD (hyperlipidemia)    History of gastroesophageal reflux (GERD)    Esophageal cancer  stent placement May 2020    Diabetes  type 2    H/O colonoscopy    History of cancer chemotherapy        ROS:  No Heartburn, regurgitation, dysphagia, odynophagia.  No dyspepsia  No abdominal pain.    No Nausea, vomiting.  No Bleeding.  No hematemesis.   No diarrhea.    No hematochesia.  No weight loss, anorexia.  No edema.      MEDICATIONS  (STANDING):  atorvastatin 10 milliGRAM(s) Oral at bedtime  lisinopril Oral Tab/Cap - Peds 2.5 milliGRAM(s) Oral daily  metoprolol succinate ER 50 milliGRAM(s) Oral daily  pantoprazole  Injectable 40 milliGRAM(s) IV Push every 12 hours  sodium chloride 0.9%. 1000 milliLiter(s) (50 mL/Hr) IV Continuous <Continuous>    MEDICATIONS  (PRN):  oxyCODONE    IR 10 milliGRAM(s) Oral every 6 hours PRN Severe Pain (7 - 10)      Allergies    Allergy Status Unknown    Intolerances    Gelatein BID (Unknown)  requests tuna salad (Unknown)      Vital Signs Last 24 Hrs  T(C): 36.8 (2020 11:16), Max: 37.2 (2020 23:34)  T(F): 98.3 (2020 11:16), Max: 98.9 (2020 23:34)  HR: 95 (2020 11:16) (92 - 111)  BP: 110/70 (2020 11:16) (100/58 - 142/71)  BP(mean): --  RR: 18 (2020 11:16) (16 - 20)  SpO2: 92% (2020 11:16) (92% - 100%)    PHYSICAL EXAM:    GENERAL: NAD, well-groomed, well-developed  HEAD:  Atraumatic, Normocephalic  EYES: EOMI, PERRLA, conjunctiva and sclera clear  ENMT: No tonsillar erythema, exudates, or enlargement; Moist mucous membranes, Good dentition, No lesions  NECK: Supple, No JVD, Normal thyroid  CHEST/LUNG: Clear to percussion bilaterally; No rales, rhonchi, wheezing, or rubs  HEART: Regular rate and rhythm; No murmurs, rubs, or gallops  ABDOMEN: Soft, Nontender, Nondistended; Bowel sounds present  EXTREMITIES:  2+ Peripheral Pulses, No clubbing, cyanosis, or edema  LYMPH: No lymphadenopathy noted  SKIN: No rashes or lesions      LABS:                        9.0    7.19  )-----------( 237      ( 2020 06:39 )             26.7         134<L>  |  98  |  44.0<H>  ----------------------------<  144<H>  4.4   |  25.0  |  1.50<H>    Ca    8.9      2020 06:39  Mg     2.1         TPro  6.8  /  Alb  3.1<L>  /  TBili  <0.2<L>  /  DBili  x   /  AST  7   /  ALT  <5  /  AlkPhos  69      PT/INR - ( 2020 14:39 )   PT: 13.6 sec;   INR: 1.18 ratio         PTT - ( 2020 14:39 )  PTT:26.8 sec   Urinalysis Basic - ( 2020 02:49 )    Color: Yellow / Appearance: Clear / S.015 / pH: x  Gluc: x / Ketone: Negative  / Bili: Negative / Urobili: Negative mg/dL   Blood: x / Protein: 30 mg/dL / Nitrite: Negative   Leuk Esterase: Negative / RBC: x / WBC x   Sq Epi: x / Non Sq Epi: x / Bacteria: x          RADIOLOGY & ADDITIONAL STUDIES:

## 2020-11-30 NOTE — PHYSICAL THERAPY INITIAL EVALUATION ADULT - ADDITIONAL COMMENTS
As per H&P, pt. lives in [facility]. Pt. reports using RW with assistance for ADLs while in [facility]. No reports of stairs.

## 2020-11-30 NOTE — CHART NOTE - NSCHARTNOTEFT_GEN_A_CORE
YEYONote: per coworker (Isak) [facility] can take pt this pm. Worker called pt's MD (Gustabo) to inform her and to obtain info about pt's status. Per Dr Montejo, pt not ready medically as of yet (hypotensive) will keep him overnight. Worker called [facility] ([facility]) informed her about the above mentioned, verbalized understanding. SW to follow.

## 2020-11-30 NOTE — PHYSICAL THERAPY INITIAL EVALUATION ADULT - CRITERIA FOR SKILLED THERAPEUTIC INTERVENTIONS
predicted duration of therapy intervention/anticipated equipment needs at discharge/risk reduction/prevention/therapy frequency/impairments found/rehab potential/functional limitations in following categories/anticipated discharge recommendation

## 2020-11-30 NOTE — PHYSICAL THERAPY INITIAL EVALUATION ADULT - ACTIVE RANGE OF MOTION EXAMINATION, REHAB EVAL
bilateral  lower extremity Active ROM was WFL (within functional limits)/bilateral upper extremity Active ROM was WFL (within functional limits)/MMT at least 3/5

## 2020-11-30 NOTE — CHART NOTE - NSCHARTNOTEFT_GEN_A_CORE
Rapid Response Follow Up:     Rapid Response called at 03:57 for SOB.   Pt seen and examined at bedside.   Pt without acute complaints.   Pt denies CP, SOB, dizziness, nausea, vomiting, HA    Vital Signs Last 24 Hrs  T(C): 36.6 (30 Nov 2020 03:29), Max: 36.8 (29 Nov 2020 11:16)  T(F): 97.9 (30 Nov 2020 03:29), Max: 98.3 (29 Nov 2020 11:16)  HR: 88 (30 Nov 2020 06:40) (81 - 109)  BP: 99/65 (30 Nov 2020 06:40) (98/57 - 164/80)  BP(mean): --  RR: 18 (30 Nov 2020 03:29) (18 - 19)  SpO2: 99% (30 Nov 2020 03:29) (92% - 99%)    PHYSICAL EXAM:  GENERAL: Pt lying in bed comfortably in NAD  HEAD:  Atraumatic   CHEST/LUNG: Clear to auscultation bilaterally  HEART: Regular rate and rhythm  ABDOMEN: Bowel sounds present; Soft, Nontender, Nondistended.   EXTREMITIES:  2+ Peripheral Pulses, no LE edema  NERVOUS SYSTEM:  Alert & Oriented X3, speech clear. Answers questions appropriately.   SKIN: Warm and dry     Labs Reviewed  EKG NSR   RN advised to notify PA if any changes   Continue to monitor Rapid Response 2- Hour Follow Up:     Rapid Response called at 03:57 for SOB.   Pt seen and examined at bedside.   Pt without acute complaints.   Pt denies CP, SOB, dizziness, nausea, vomiting, HA    Vital Signs Last 24 Hrs  T(C): 36.6 (30 Nov 2020 03:29), Max: 36.8 (29 Nov 2020 11:16)  T(F): 97.9 (30 Nov 2020 03:29), Max: 98.3 (29 Nov 2020 11:16)  HR: 88 (30 Nov 2020 06:40) (81 - 109)  BP: 99/65 (30 Nov 2020 06:40) (98/57 - 164/80)  BP(mean): --  RR: 18 (30 Nov 2020 03:29) (18 - 19)  SpO2: 99% (30 Nov 2020 03:29) (92% - 99%) on room air     PHYSICAL EXAM:  GENERAL: Pt lying in bed comfortably in NAD  HEAD:  Atraumatic   CHEST/LUNG: Clear to auscultation bilaterally  HEART: Regular rate and rhythm  ABDOMEN: Bowel sounds present; Soft, Nontender, Nondistended.   EXTREMITIES:  2+ Peripheral Pulses, no LE edema  NERVOUS SYSTEM:  Alert & Oriented X3, speech clear. Answers questions appropriately.   SKIN: Warm and dry     Labs Reviewed   EKG NSR   RN advised to notify PA if any changes   Continue to monitor

## 2020-11-30 NOTE — RAPID RESPONSE TEAM SUMMARY - NSADDTLFINDINGSRRT_GEN_ALL_CORE
Vital Signs Last 24 Hrs  T(C): 36.6 (30 Nov 2020 03:29), Max: 36.8 (29 Nov 2020 11:16)  T(F): 97.9 (30 Nov 2020 03:29), Max: 98.3 (29 Nov 2020 11:16)  HR: 109 (30 Nov 2020 03:29) (81 - 109)  BP: 164/80 (30 Nov 2020 03:29) (98/57 - 164/80)  BP(mean): --  RR: 18 (30 Nov 2020 03:29) (18 - 20)  SpO2: 99% (30 Nov 2020 03:29) (92% - 99%)

## 2020-11-30 NOTE — RAPID RESPONSE TEAM SUMMARY - NSSITUATIONBACKGROUNDRRT_GEN_ALL_CORE
60 y/o M with PMH of HTN, HLD, DM, CKD, stage 4 esophageal cancer on hospice admitted for pre-syncope due to anemia of 5.1 on admission.  Was transfused 3 units of PRBC's was to be sent back to [facility] in AM on hospice.  Patient was diaphoretic and stating he could not breath,  O2 sats 100% ON 3LNC.

## 2020-11-30 NOTE — PHYSICAL THERAPY INITIAL EVALUATION ADULT - PERTINENT HX OF CURRENT PROBLEM, REHAB EVAL
Curette Text: Prior to application of cantharidin the lesions were lightly pared with a curette. Strength: Kelly Weakness and abdominal pain Medical Necessity Information: It is in your best interest to select a reason for this procedure from the list below. All of these items fulfill various CMS LCD requirements except the new and changing color options. Medical Necessity Clause: This procedure was medically necessary because the lesions that were treated were: Post-Care Instructions: I reviewed with the patient in detail post-care instructions. The patient understands that the treated areas should be washed off 2 hours after application. Curette Before Application?: No Detail Level: Detailed Consent: The patient's consent was obtained including but not limited to risks of crusting, scabbing, scarring, blistering, darker or lighter pigmentary change, recurrence, incomplete removal and infection.

## 2020-11-30 NOTE — PROGRESS NOTE ADULT - SUBJECTIVE AND OBJECTIVE BOX
[facility] Division of Hospital Medicine    Chief Complaint: anemia    SUBJECTIVE / OVERNIGHT EVENTS: Patient seen and examined. Had RRT overnight for shortness of breath due to anxiety received ativan with improvement. Still with anxiety this Am.     Patient denies chest pain, SOB, abd pain, N/V, fever, chills, dysuria or any other complaints. All remainder ROS negative.     MEDICATIONS  (STANDING):  atorvastatin 10 milliGRAM(s) Oral at bedtime  fentaNYL   Patch  12 MICROgram(s)/Hr 1 Patch Transdermal every 72 hours  lisinopril Oral Tab/Cap - Peds 2.5 milliGRAM(s) Oral daily  metoprolol succinate ER 50 milliGRAM(s) Oral daily  pantoprazole    Tablet 40 milliGRAM(s) Oral two times a day    MEDICATIONS  (PRN):  ALPRAZolam 0.25 milliGRAM(s) Oral every 8 hours PRN anxiety  oxyCODONE    IR 10 milliGRAM(s) Oral every 6 hours PRN Severe Pain (7 - 10)        I&O's Summary      PHYSICAL EXAM:  Vital Signs Last 24 Hrs  T(C): 36.6 (2020 12:02), Max: 36.9 (2020 08:12)  T(F): 97.9 (2020 12:02), Max: 98.4 (2020 08:12)  HR: 89 (2020 13:47) (81 - 109)  BP: 125/66 (2020 13:47) (85/52 - 164/80)  BP(mean): --  RR: 18 (2020 12:02) (18 - 19)  SpO2: 97% (2020 12:02) (92% - 99%)      CONSTITUTIONAL: NAD, well-developed, well-groomed, thin  ENMT: Moist oral mucosa, no pharyngeal injection or exudates  RESPIRATORY: Normal respiratory effort; lungs are clear to auscultation bilaterally  CARDIOVASCULAR: Regular rate and rhythm, normal S1 and S2, no murmur/rub/gallop; No lower extremity edema;  ABDOMEN: Nontender to palpation, normoactive bowel sounds, no rebound/guarding;  MUSCLOSKELETAL: no clubbing or cyanosis of digits; no joint swelling or tenderness to palpation  PSYCH: A+O to person, place, and time; affect appropriate  NEUROLOGY: CN 2-12 are intact and symmetric; no gross sensory deficits;   SKIN: No rashes; no palpable lesions      LABS:                        9.2    9.23  )-----------( 271      ( 2020 12:17 )             28.3     11    132<L>  |  96<L>  |  34.0<H>  ----------------------------<  151<H>  4.2   |  24.0  |  1.50<H>    Ca    9.1      2020 04:01  Mg     2.1         TPro  6.8  /  Alb  3.1<L>  /  TBili  <0.2<L>  /  DBili  x   /  AST  7   /  ALT  <5  /  AlkPhos  69      PT/INR - ( 2020 14:39 )   PT: 13.6 sec;   INR: 1.18 ratio         PTT - ( 2020 14:39 )  PTT:26.8 sec  CARDIAC MARKERS ( 2020 04:02 )  x     / <0.01 ng/mL / 22 U/L / x     / x      CARDIAC MARKERS ( 2020 14:39 )  x     / <0.01 ng/mL / x     / x     / x          Urinalysis Basic - ( 2020 02:49 )    Color: Yellow / Appearance: Clear / S.015 / pH: x  Gluc: x / Ketone: Negative  / Bili: Negative / Urobili: Negative mg/dL   Blood: x / Protein: 30 mg/dL / Nitrite: Negative   Leuk Esterase: Negative / RBC: x / WBC x   Sq Epi: x / Non Sq Epi: x / Bacteria: x        CAPILLARY BLOOD GLUCOSE      POCT Blood Glucose.: 136 mg/dL (2020 03:39)        RADIOLOGY & ADDITIONAL TESTS:  Results Reviewed:   Imaging Personally Reviewed:  Electrocardiogram Personally Reviewed:

## 2020-11-30 NOTE — PROGRESS NOTE ADULT - ASSESSMENT
58 y/o M with PMH of HTN, HLD, DM, CKD, stage 4 esophageal cancer on hospice admitted for pre-syncope due to anemia of 5.1 on admission. patient received blood transfusiona dn seen by GI.     SOB due to anxiety  cxr and ekg reviewed. received ativan and felt better  - prn xanax q 8 hours    Presyncopal episode in the setting of GI Bleed  - Hgb 5.1 (8.9 in 8/2020)  - transfuse 3 units PRBC , hgb stable post tranfusion  - IV Protonix 40mg BID  - GI consulted, they Suspect local advancement of the tumor causing blood loss anemia.  No role for EGD or cautery of any other GI work up.  Hospice pt who wishes only to get transfused and return to [facility].    ISIDRO on CKD, stable  - monitor BMP    DM  - FS with [facility]    HTN  - Lisinopril 2.5mg daily  - Toprol XL 50mg daily    HLD  - Lipitor 10mg nightly    Stage 4 Esophageal Cancer  - Oxycodone 10mg q6h PRN  - restart home fentanyl 12mcg due to be changed today  - pall care consulted for help managing symptoms of pain and anxiety. Patient has [facility] at [facility].    DVT ppx  - SCDs  dispo: dc back to [facility] on hospice once auth obtained. Patient medically ready fro dc. CM/SW aware  sister updated.

## 2020-11-30 NOTE — RAPID RESPONSE TEAM SUMMARY - NSOTHERINTERVENTIONSRRT_GEN_ALL_CORE
THERAPEUTIC COMMUNICATION PATIENT STATES HE IS ANXIOUS REGARDING HIS DIAGNOSIS.  MD PROVIDED EMOTIONAL SUPPORT.

## 2020-12-01 NOTE — DISCHARGE NOTE NURSING/CASE MANAGEMENT/SOCIAL WORK - PATIENT PORTAL LINK FT
You can access the FollowMyHealth Patient Portal offered by [facility] by registering at the following website: [email or web address]. By joining [employer]’s FollowMyHealth portal, you will also be able to view your health information using other applications (apps) compatible with our system.

## 2020-12-01 NOTE — CONSULT NOTE ADULT - PROBLEM SELECTOR RECOMMENDATION 5
PC service consulted to assist with symptom management.   Patient at [facility] with [facility]  Pain medications adjusted with addition of bowel regimen.  Patient is reported that he wishes full code. Would continue advance directives discussions with [facility]  patient to return to [facility] today

## 2020-12-01 NOTE — CONSULT NOTE ADULT - SUBJECTIVE AND OBJECTIVE BOX
Palliative Medicine Initial Consultation Note    HPI:  60 y/o M with PMH of HTN, HLD, DM, CKD, stage 4 esophageal cancer on hospice presents from [facility] with complaints of near syncope. The patient states that he was trying to get up from his bed to his chair and when he did he felt lightheaded and dizzy. Patient states he almost fell down and at the same time was having abdominal pain.     PERTINENT PMH REVIEWED:  [x ] YES [ ] NO         Diabetes type 2    Esophageal cancer stent placement May 2020    History of gastroesophageal reflux (GERD)     HLD (hyperlipidemia)     HTN (hypertension).     PAST SURGICAL HISTORY:  H/O colonoscopy     History of cancer chemotherapy.     SOCIAL HISTORY:        EtOH [ ] Yes  [ x] No                                    Drugs [ ] Yes [x ] No                                   [ ] smoker [ x] nonsmoker                                    Admitted from: [ ] home [ x] [facility] _[facility] [ ] GHISLAINE ________    Surrogate/HCP/Guardian: sister Isadora  FAMILY HISTORY:  FHx: throat cancer  father, former heavy smoker    FHx: diabetes mellitus  maternal grandmother    Baseline ADLs (prior to admission):  Independent [ ] moderately [ ] fully   Dependent   [ ] moderately [x ]fully    MEDICATIONS  (STANDING):  atorvastatin 10 milliGRAM(s) Oral at bedtime  fentaNYL   Patch  25 MICROgram(s)/Hr 1 Patch Transdermal every 72 hours  lidocaine   Patch 1 Patch Transdermal daily  lisinopril Oral Tab/Cap - Peds 2.5 milliGRAM(s) Oral daily  metoprolol succinate ER 50 milliGRAM(s) Oral daily  pantoprazole    Tablet 40 milliGRAM(s) Oral two times a day  senna 2 Tablet(s) Oral at bedtime  tamsulosin 0.4 milliGRAM(s) Oral at bedtime    MEDICATIONS  (PRN):  ALPRAZolam 0.25 milliGRAM(s) Oral every 8 hours PRN anxiety  oxyCODONE    IR 10 milliGRAM(s) Oral every 6 hours PRN Severe Pain (7 - 10)  polyethylene glycol 3350 17 Gram(s) Oral daily PRN Constipation      Allergies    Allergy Status Unknown    Intolerances    Gelatein BID (Unknown)  requests tuna salad (Unknown)      REVIEW OF SYSTEMS     see HPI  [ ] Unable to obtain due to poor mentation     General:  see HPI  Skin: no rashes, skin changes  	  Ophthalmologic: no eye pain or blurred vision  	  ENMT:	no sore throat, no ear pain    Respiratory and Thorax: no cough,  no  SOB 	    Cardiovascular:	no chest pain, no leg swelling    Gastrointestinal:	no  n/v/d,c    Genitourinary:	no FUD    Musculoskeletal: no muscle pain	    Neurological: see HPI    Hematology/Lymphatics: no petechia or purpura	    Endocrine: no polyuria, no polydipsia	      Karnofsky Performance Score/Palliative Performance Status Version 2:   30      %    Vital Signs Last 24 Hrs  T(C): 36.1 (01 Dec 2020 11:37), Max: 36.9 (30 Nov 2020 22:19)  T(F): 97 (01 Dec 2020 11:37), Max: 98.5 (30 Nov 2020 22:19)  HR: 110 (01 Dec 2020 11:37) (80 - 112)  BP: 144/76 (01 Dec 2020 11:37) (95/57 - 144/76)  BP(mean): --  RR: 20 (01 Dec 2020 11:37) (18 - 20)  SpO2: 98% (01 Dec 2020 11:37) (92% - 98%)    PHYSICAL EXAM:    General: Frail cachetic man NAD  HEENT: [ x] normal  [ ] dry mouth  [ ] ET tube/trach    Lungs: [x ] comfortable [ ] tachypnea/labored breathing  [ ] excessive secretions    CV: [x ] normal  [ ] tachycardia    GI: soft sight distended non tender No rebound or guarding    : [x ] normal  [ ] incontinent  [ ] oliguria/anuria  [ ] cheng    MSK: [ ] normal  [x ] weakness  [ ] edema             [ ] ambulatory  [ x] bedbound/wheelchair bound    Skin: [ ] normal  [ ] pressure ulcers- Stage_____  [ x] no rash    LABS:                        9.4    8.52  )-----------( 286      ( 01 Dec 2020 09:15 )             28.5     12-01    135  |  100  |  22.0<H>  ----------------------------<  126<H>  3.9   |  25.0  |  1.16    Ca    9.0      01 Dec 2020 09:15  Mg     1.7     12-01          I&O's Summary    30 Nov 2020 07:01  -  01 Dec 2020 07:00  --------------------------------------------------------  IN: 330 mL / OUT: 355 mL / NET: -25 mL        RADIOLOGY & ADDITIONAL STUDIES:      < from: CT Abdomen and Pelvis No Cont (08.21.20 @ 12:13) >  IMPRESSION:  Worsening multifocal pneumonia since prior CT of 8/13/2020. Please refer to dedicated CT chest performed earlier today.    Gastric distention with fluid.    Distal esophageal stent is unchanged in location, traversing the patient's known gastroesophagealmass. Fluid in the distal esophagus; recommend aspiration precautions.    < end of copied text >        ADVANCE DIRECTIVES:  [ ] YES [x ] NO   DNR [ ] YES [ x] NO  Completed on:                     MOLST  [ ] YES [ x] NO   Completed on:  Living Will  [ ] YES [x ] NO   Completed on:     Palliative Medicine Initial Consultation Note    HPI:  60 y/o M with PMH of HTN, HLD, DM, CKD, stage 4 esophageal cancer on hospice presents from [facility] with complaints of near syncope. The patient states that he was trying to get up from his bed to his chair and when he did he felt lightheaded and dizzy. Patient states he almost fell down and at the same time was having abdominal pain.     Patient reports back pain. No radiation, numbness, extremity weakness urinary issues.   Reports long hx of abdominal pain described as sharp. No BM in few days. No n/v  Has been on Fentanyl patch and taking Oxycodone PRN    PERTINENT PMH REVIEWED:  [x ] YES [ ] NO         Diabetes type 2    Esophageal cancer stent placement May 2020    History of gastroesophageal reflux (GERD)     HLD (hyperlipidemia)     HTN (hypertension).     PAST SURGICAL HISTORY:  H/O colonoscopy     History of cancer chemotherapy.     SOCIAL HISTORY:        EtOH [ ] Yes  [ x] No                                    Drugs [ ] Yes [x ] No                                   [ ] smoker [ x] nonsmoker                                    Admitted from: [ ] home [ x] [facility] _[facility] [ ] GHISLAINE ________    Surrogate/HCP/Guardian: sister Isadora  FAMILY HISTORY:  FHx: throat cancer  father, former heavy smoker    FHx: diabetes mellitus  maternal grandmother    Baseline ADLs (prior to admission):  Independent [ ] moderately [ ] fully   Dependent   [ ] moderately [x ]fully    MEDICATIONS  (STANDING):  atorvastatin 10 milliGRAM(s) Oral at bedtime  fentaNYL   Patch  25 MICROgram(s)/Hr 1 Patch Transdermal every 72 hours  lidocaine   Patch 1 Patch Transdermal daily  lisinopril Oral Tab/Cap - Peds 2.5 milliGRAM(s) Oral daily  metoprolol succinate ER 50 milliGRAM(s) Oral daily  pantoprazole    Tablet 40 milliGRAM(s) Oral two times a day  senna 2 Tablet(s) Oral at bedtime  tamsulosin 0.4 milliGRAM(s) Oral at bedtime    MEDICATIONS  (PRN):  ALPRAZolam 0.25 milliGRAM(s) Oral every 8 hours PRN anxiety  oxyCODONE    IR 10 milliGRAM(s) Oral every 6 hours PRN Severe Pain (7 - 10)  polyethylene glycol 3350 17 Gram(s) Oral daily PRN Constipation      Allergies    Allergy Status Unknown    Intolerances    Gelatein BID (Unknown)  requests tuna salad (Unknown)      REVIEW OF SYSTEMS     see HPI  [ ] Unable to obtain due to poor mentation     General:  see HPI  Skin: no rashes, skin changes  	  Ophthalmologic: no eye pain or blurred vision  	  ENMT:	no sore throat, no ear pain    Respiratory and Thorax: no cough,  no  SOB 	    Cardiovascular:	no chest pain, no leg swelling    Gastrointestinal:	no  n/v/d,c    Genitourinary:	no FUD    Musculoskeletal: no muscle pain	    Neurological: see HPI    Hematology/Lymphatics: no petechia or purpura	    Endocrine: no polyuria, no polydipsia	      Karnofsky Performance Score/Palliative Performance Status Version 2:   30      %    Vital Signs Last 24 Hrs  T(C): 36.1 (01 Dec 2020 11:37), Max: 36.9 (30 Nov 2020 22:19)  T(F): 97 (01 Dec 2020 11:37), Max: 98.5 (30 Nov 2020 22:19)  HR: 110 (01 Dec 2020 11:37) (80 - 112)  BP: 144/76 (01 Dec 2020 11:37) (95/57 - 144/76)  BP(mean): --  RR: 20 (01 Dec 2020 11:37) (18 - 20)  SpO2: 98% (01 Dec 2020 11:37) (92% - 98%)    PHYSICAL EXAM:    General: Frail cachetic man NAD  HEENT: [ x] normal  [ ] dry mouth  [ ] ET tube/trach    Lungs: [x ] comfortable [ ] tachypnea/labored breathing  [ ] excessive secretions    CV: [x ] normal  [ ] tachycardia    GI: soft sight distended non tender No rebound or guarding    : [x ] normal  [ ] incontinent  [ ] oliguria/anuria  [ ] cheng    MSK: [ ] normal  [x ] weakness  [ ] edema             [ ] ambulatory  [ x] bedbound/wheelchair bound    Skin: [ ] normal  [ ] pressure ulcers- Stage_____  [ x] no rash    LABS:                        9.4    8.52  )-----------( 286      ( 01 Dec 2020 09:15 )             28.5     12-01    135  |  100  |  22.0<H>  ----------------------------<  126<H>  3.9   |  25.0  |  1.16    Ca    9.0      01 Dec 2020 09:15  Mg     1.7     12-01          I&O's Summary    30 Nov 2020 07:01  -  01 Dec 2020 07:00  --------------------------------------------------------  IN: 330 mL / OUT: 355 mL / NET: -25 mL        RADIOLOGY & ADDITIONAL STUDIES:      < from: CT Abdomen and Pelvis No Cont (08.21.20 @ 12:13) >  IMPRESSION:  Worsening multifocal pneumonia since prior CT of 8/13/2020. Please refer to dedicated CT chest performed earlier today.    Gastric distention with fluid.    Distal esophageal stent is unchanged in location, traversing the patient's known gastroesophagealmass. Fluid in the distal esophagus; recommend aspiration precautions.    < end of copied text >        ADVANCE DIRECTIVES:  [ ] YES [x ] NO   DNR [ ] YES [ x] NO  Completed on:                     MOLST  [ ] YES [ x] NO   Completed on:  Living Will  [ ] YES [x ] NO   Completed on:

## 2020-12-01 NOTE — CHART NOTE - NSCHARTNOTEFT_GEN_A_CORE
pall care consult appreciated  patient discharge to [facility] with hospice. Hospice to up-titrate meds as needed.

## 2020-12-04 PROBLEM — C15.9 ESOPHAGEAL ADENOCARCINOMA: Status: ACTIVE | Noted: 2020-01-01

## 2020-12-04 PROBLEM — G89.3 CANCER RELATED PAIN: Status: ACTIVE | Noted: 2020-01-01

## 2020-12-04 NOTE — REASON FOR VISIT
[Follow-Up Visit] : a follow-up [Other Location: e.g. School (Enter Location, City,State)___] : at [unfilled], at the time of the visit. [Medical Office: ([facility])___] : at the medical office located in  [Other:____] : [unfilled] [Verbal consent obtained from patient] : the patient, [unfilled]

## 2020-12-04 NOTE — HISTORY OF PRESENT ILLNESS
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  He initially saw GI, Dr. Jacek Adhikari, in March 2019 due to anemia.  A colonoscopy was done in July 2019 and an EGD was discussed but not done until 5/20/20 when he was f/w an esophageal mass.  Pathology c/w adenocarcinoma.  Staging CT showed  marked thickening of GE junction without definable tumor. There is evidence of small gastrohepatic ligament lymph nodes measuring up to 1.2 cm in size.  New radiographic finding of a mid small bowel lesion with post symmetrical and asymmetrical thickening of the bowel wall without significant surrounding inflammatory process or definable intussusception. Given the focal nature this is more suspicious for small bowel tumor as opposed to an inflammatory process. [de-identified] : Esophageal adenocarcinoma, HER-2 positive [de-identified] : Past medical history of DM, HTN, and CKD\par SH: Non - smoker and no ETOH.  No environmental exposures.  No hx of reflux.  There is a hx of second hand smoke.  Pt lives with his mother.   Pt works as a  but now on unemployment.   [de-identified] : Pt presents for follow up  on home hospice.   Due to inability to tolerate solids and only puréed diet with weight loss, GI placed esophageal stent 5/28/20 and he can now tolerate solids that are soft. Today, denies diarrhea, back pain, sob, cough, n/v/d or any other sx. However recently, has had increased abdominal pain, N/V.

## 2021-03-25 NOTE — PROGRESS NOTE ADULT - COVID-19 NEGATIVE LAB RESULT
COVID-19 ruled out Consent 2/Introductory Paragraph: Mohs surgery was explained to the patient and consent was obtained. The risks, benefits and alternatives to therapy were discussed in detail. Specifically, the risks of infection, scarring, bleeding, prolonged wound healing, incomplete removal, allergy to anesthesia, nerve injury and recurrence were addressed. Prior to the procedure, the treatment site was clearly identified and confirmed by the patient. All components of Universal Protocol/PAUSE Rule completed.

## 2021-06-23 NOTE — RAPID RESPONSE TEAM SUMMARY - NSREASONFORCALLINGRRT_GEN_ALL_CORE
Other (specify).../Staff concern Patient is a 57y old  Male who presents with a chief complaint of CVA (23 Jun 2021 08:40)    Translation via [facility] ID #[id number]    Patient seen and examined at bedside. No overnight events reported.     ALLERGIES:  No Known Allergies    MEDICATIONS  (STANDING):  acetaminophen   Tablet .. 650 milliGRAM(s) Oral every 6 hours  apixaban 10 milliGRAM(s) Oral every 12 hours  AQUAPHOR (petrolatum Ointment) 1 Application(s) Topical three times a day  atorvastatin 40 milliGRAM(s) Oral at bedtime  cadexomer iodine 0.9% Gel 1 Application(s) Topical daily  dextrose 40% Gel 15 Gram(s) Oral once  dextrose 5%. 1000 milliLiter(s) (100 mL/Hr) IV Continuous <Continuous>  dextrose 5%. 1000 milliLiter(s) (50 mL/Hr) IV Continuous <Continuous>  dextrose 50% Injectable 25 Gram(s) IV Push once  dextrose 50% Injectable 12.5 Gram(s) IV Push once  dextrose 50% Injectable 25 Gram(s) IV Push once  glucagon  Injectable 1 milliGRAM(s) IntraMuscular once  insulin lispro (ADMELOG) corrective regimen sliding scale   SubCutaneous Before meals and at bedtime  levETIRAcetam  Solution 500 milliGRAM(s) Oral two times a day  metFORMIN 500 milliGRAM(s) Oral two times a day  pantoprazole   Suspension 40 milliGRAM(s) Oral daily    MEDICATIONS  (PRN):  aluminum hydroxide/magnesium hydroxide/simethicone Suspension 30 milliLiter(s) Oral every 4 hours PRN Dyspepsia  ondansetron   Disintegrating Tablet 4 milliGRAM(s) Oral every 6 hours PRN Nausea and/or Vomiting    Vital Signs Last 24 Hrs  T(F): 97.9 (22 Jun 2021 19:19), Max: 97.9 (22 Jun 2021 19:19)  HR: 84 (22 Jun 2021 19:19) (84 - 100)  BP: 135/70 (22 Jun 2021 19:19) (112/72 - 135/70)  RR: 16 (22 Jun 2021 19:19) (16 - 18)  SpO2: 97% (22 Jun 2021 19:19) (97% - 97%)  I&O's Summary    22 Jun 2021 07:01  -  23 Jun 2021 07:00  --------------------------------------------------------  IN: 660 mL / OUT: 700 mL / NET: -40 mL      PHYSICAL EXAM:  General: NAD, A/O x 3  ENT: Moist mucous membranes, no thrush  Neck: Supple, No JVD  Lungs: Clear to auscultation bilaterally, good air entry, non-labored breathing  Cardio: RRR, S1/S2, No murmur  Abdomen: Soft, Nontender, Nondistended; Bowel sounds present; abdominal wound packed/dressing in place  Extremities: No calf tenderness, No pitting edema    LABS:                        12.0   8.95  )-----------( 346      ( 23 Jun 2021 06:35 )             38.1     06-21    140  |  106  |  17  ----------------------------<  140  4.4   |  28  |  1.01    Ca    9.1      21 Jun 2021 07:30    TPro  7.7  /  Alb  2.9  /  TBili  0.3  /  DBili  x   /  AST  26  /  ALT  40  /  AlkPhos  79  06-21        eGFR if Non African American: 81 mL/min/1.73M2 (06-21-21 @ 07:30)      04-26 Chol 114 mg/dL LDL -- HDL 35 mg/dL Trig 126 mg/dL      POCT Blood Glucose.: 97 mg/dL (23 Jun 2021 08:30)  POCT Blood Glucose.: 133 mg/dL (22 Jun 2021 22:04)  POCT Blood Glucose.: 121 mg/dL (22 Jun 2021 17:05)  POCT Blood Glucose.: 222 mg/dL (22 Jun 2021 11:34)      COVID-19 PCR: NotDetec (06-20-21 @ 05:00)  COVID-19 PCR: NotDetec (06-15-21 @ 19:15)  COVID-19 PCR: NotDetec (06-02-21 @ 08:50)  COVID-19 PCR: NotDetec (05-31-21 @ 07:10)  COVID-19 PCR: NotDetec (05-26-21 @ 07:21)

## 2022-01-01 NOTE — ED STATDOCS - PRINCIPAL DIAGNOSIS
Patient's first and last name, , procedure, and correct site confirmed prior to the start of procedure. Allergic reaction, initial encounter

## 2022-03-07 NOTE — DISCHARGE NOTE PROVIDER - DISCHARGE DATE
29-Nov-2020 01-Dec-2020 Methotrexate Pregnancy And Lactation Text: This medication is Pregnancy Category X and is known to cause fetal harm. This medication is excreted in breast milk.

## 2022-08-09 NOTE — ED ADULT TRIAGE NOTE - IDEAL BODY WEIGHT(KG)
62
Airway patent, Nasal mucosa clear. Mouth with normal mucosa. Throat has no vesicles, no oropharyngeal exudates and uvula is midline. TMs clear b/l, small wax noted b/l

## 2022-08-20 NOTE — PHYSICAL THERAPY INITIAL EVALUATION ADULT - ACTIVE RANGE OF MOTION EXAMINATION, REHAB EVAL
JORDYNARLINE        MR#: [id number]        STEREOTACTIC BODY RADIOTHERAPY TREATMENT (SBRT) NOTE        DATE OF SERVICE:  8/19/2022          Diagnosis: Clinical C34.31 Malignant neoplasm of lower lobe, right bronchus or lung   T1b N0 M0 IA2      Medical Indication for SBRT: The patient has medically inoperable presumed  Right   lung cancer due to poor pulmonary function.     Intent of SBRT: Curative     Narrative:  This is a treatment note for 4th fraction of the 5 fraction SBRT for resumed right lung   cancer. Biopsy was not possible. EBUS was unremarkable.        The patient was positioned in the custom made immobilization in the [employer]   body frame allowing X, Y, Z coordinates to be verified. The patient had a cone beam CT   scan today prior to treatment to verify the new central axis coordinates.       The new XYZ coordinates has shifted to ? 0. 6  cm X(lateral), ? 0. 4 cm Y(longitudinal),   and  ? 0. 1 cm Z(vertical).  After the cone beam CT verifications, the CAX set up was on   the treatment machine (True-Beam), the patient had treatment delivered with 10 non-  coplanar beams. The patient had 1000 cGy delivered with heterogeneity corrections with   a 6 MV photons. The cumulative dose is  1000 cGy of planned 5000 cGy.     No complication was encountered.  There was no complaint or skin reaction seen. The   patient tolerated the therapy well and left the department in stable condition and will   return for the 5th fraction of total 5 on August 22, 2022.    The patient will continue with radiotherapy.     JANEEN Ruvalcaba MD  Department of Therapeutic Radiology                        
bilateral  lower extremity Active ROM was WFL (within functional limits)
